# Patient Record
Sex: MALE | Race: WHITE | Employment: FULL TIME | URBAN - METROPOLITAN AREA
[De-identification: names, ages, dates, MRNs, and addresses within clinical notes are randomized per-mention and may not be internally consistent; named-entity substitution may affect disease eponyms.]

---

## 2017-05-24 DIAGNOSIS — I35.0 NONRHEUMATIC AORTIC VALVE STENOSIS: ICD-10-CM

## 2017-05-24 RX ORDER — LISINOPRIL 2.5 MG/1
TABLET ORAL
Qty: 30 TABLET | Refills: 0 | Status: SHIPPED | OUTPATIENT
Start: 2017-05-24 | End: 2018-02-08 | Stop reason: DRUGHIGH

## 2017-07-14 ENCOUNTER — HOSPITAL ENCOUNTER (OUTPATIENT)
Dept: OTHER | Age: 47
Discharge: HOME | End: 2017-07-14
Attending: INTERNAL MEDICINE | Admitting: INTERNAL MEDICINE

## 2017-11-28 RX ORDER — WARFARIN SODIUM 5 MG/1
TABLET ORAL
Qty: 30 TABLET | Refills: 0 | Status: SHIPPED | OUTPATIENT
Start: 2017-11-28 | End: 2017-12-27 | Stop reason: SDUPTHER

## 2017-12-28 RX ORDER — WARFARIN SODIUM 5 MG/1
TABLET ORAL
Qty: 30 TABLET | Refills: 0 | Status: SHIPPED | OUTPATIENT
Start: 2017-12-28 | End: 2018-02-08 | Stop reason: SDUPTHER

## 2018-02-08 ENCOUNTER — OFFICE VISIT (OUTPATIENT)
Dept: CARDIOLOGY CLINIC | Age: 48
End: 2018-02-08

## 2018-02-08 VITALS
SYSTOLIC BLOOD PRESSURE: 122 MMHG | WEIGHT: 256 LBS | DIASTOLIC BLOOD PRESSURE: 78 MMHG | BODY MASS INDEX: 35.84 KG/M2 | OXYGEN SATURATION: 95 % | HEIGHT: 71 IN | HEART RATE: 80 BPM

## 2018-02-08 DIAGNOSIS — E78.2 MIXED HYPERLIPIDEMIA: ICD-10-CM

## 2018-02-08 DIAGNOSIS — I35.0 NONRHEUMATIC AORTIC VALVE STENOSIS: Primary | ICD-10-CM

## 2018-02-08 DIAGNOSIS — I10 ESSENTIAL HYPERTENSION: ICD-10-CM

## 2018-02-08 PROCEDURE — 99214 OFFICE O/P EST MOD 30 MIN: CPT | Performed by: NURSE PRACTITIONER

## 2018-02-08 RX ORDER — WARFARIN SODIUM 5 MG/1
TABLET ORAL
Qty: 180 TABLET | Refills: 1 | Status: SHIPPED | OUTPATIENT
Start: 2018-02-08 | End: 2019-01-21 | Stop reason: SDUPTHER

## 2018-02-08 RX ORDER — ALLOPURINOL 100 MG/1
100 TABLET ORAL 2 TIMES DAILY
COMMUNITY
End: 2019-01-21 | Stop reason: ALTCHOICE

## 2018-02-08 RX ORDER — LISINOPRIL 10 MG/1
10 TABLET ORAL DAILY
COMMUNITY
End: 2019-01-21 | Stop reason: SDUPTHER

## 2018-02-08 NOTE — PROGRESS NOTES
66 07/14/2017    BILITOT <0.2 07/14/2017     Lab Results   Component Value Date    CREATININE 1.0 07/14/2017    BUN 13 07/14/2017     07/14/2017    K 4.6 07/14/2017    CL 99 07/14/2017    CO2 25 07/14/2017     Lab Results   Component Value Date    TSH 1.61 07/14/2017     Lab Results   Component Value Date    WBC 6.1 07/14/2017    HGB 14.1 07/14/2017    HCT 42.0 07/14/2017    MCV 93.2 07/14/2017     07/14/2017     No components found for: CHLPL  Lab Results   Component Value Date    TRIG 129 07/14/2017    TRIG 107 03/04/2016    TRIG 151 (H) 08/04/2014     Lab Results   Component Value Date    HDL 73 (H) 07/14/2017    HDL 57 03/04/2016    HDL 52 08/04/2014     Lab Results   Component Value Date    LDLCALC 196 (H) 07/14/2017    LDLCALC 182 (H) 03/04/2016    LDLCALC 142 (H) 08/04/2014     Lab Results   Component Value Date    LABVLDL 26 07/14/2017    LABVLDL 21 03/04/2016    LABVLDL 30 08/04/2014     Radiology Review:  Pertinent images / reports were reviewed as a part of this visit and reveals the following:    Last Angiogram: 7/31/14:   Normal Coronaries    Carotid US: 8/1/14: maryan ICA 1-15%    8/8/14: procedure:  PREOPERATIVE DIAGNOSIS: Severe symptomatic aortic stenosis. POSTOPERATIVE DIAGNOSIS: Severe symptomatic aortic stenosis. PROCEDURE:   1. Aortic valve replacement with a 22-mm ATS  mechanical valve, serial number [id number].  2. Transesophageal echocardiography performed by Anesthesia. Echo: Nov '15:  Summary  -Normal left ventricle size and systolic function with an estimated ejection fraction of 55-60%. -No regional wall motion abnormalities are seen. -Mild concentric left ventricular hypertrophy is present.  -There is reversal of E/A inflow velocities across the mitral valve suggesting impaired left ventricular relaxation.  -A mechanical aortic valve appears well seated with a maximum gradient of 21 mmHg and a mean gradient of 10 mmHg.   -There is trivial tricuspid regurgitation with RVSP estimated at 33 mmHg. -The left atrium is at the upper limits of normal in size. Assessment:     1. Aortic stenosis   ~stable: s/p AVR  ~well seated prosthetic valve by echo Nov '15; maximum gradient of 21 mmHg and a mean gradient of 10 mmHg  ~crisp valve to auscultation   ~warfarin managed in coumadin clinic. Last INR 2.2; current dose is 10 mg on M & F, 5 mg remaining days     2. Other and unspecified hyperlipidemia   ~LDL elevated on profile from '17  ~we will attempt to obtain his most recent labs from his PCP    3. HTN         ~suboptimal on arrival ; improved by recheck         ~lisinopril increased to 10 mg daily one week ago by PCP          I had the opportunity to review the clinical symptoms and presentation of [address] [place] [address] III. Plan:     1. Continue present management  2. F/U in 8 months with Dr. Luis Carlos Vázquez       Overall the patient is stable from CV standpoint    I have addresed the patient's cardiac risk factors and adjusted pharmacologic treatment as needed. In addition, I have reinforced the need for patient directed risk factor modification. Further evaluation will be based upon the patient's clinical course and testing results. All questions and concerns were addressed to the patient. Alternatives to my treatment were discussed. The patient is not currently smoking: quit 6 months ago. The risks related to smoking were reviewed with the patient. Recommend maintaining a smoke-free lifestyle. Patient is on a beta-blocker  Patient is on an ace-i  Patient is on a statin    Dual Antiplatelet therapy / anti-coagulation has been recommended / prescribed for this patient. Education conducted on adverse reactions including bleeding was discussed. The patient verbalizes understanding not to stop medications without discussing with us. Discussed exercise: 30-60 minutes 7 days/week. Uses elliptical 3 x weekly and using weights. Discussed Low saturated fat diet.      Thank

## 2018-06-26 DIAGNOSIS — I35.0 NONRHEUMATIC AORTIC VALVE STENOSIS: ICD-10-CM

## 2018-06-27 RX ORDER — LISINOPRIL 2.5 MG/1
TABLET ORAL
Qty: 90 TABLET | Refills: 3 | Status: SHIPPED | OUTPATIENT
Start: 2018-06-27 | End: 2019-01-21 | Stop reason: DRUGHIGH

## 2018-08-02 ENCOUNTER — TELEPHONE (OUTPATIENT)
Dept: CARDIOLOGY CLINIC | Age: 48
End: 2018-08-02

## 2018-08-07 RX ORDER — WARFARIN SODIUM 5 MG/1
TABLET ORAL
Qty: 180 TABLET | Refills: 1 | OUTPATIENT
Start: 2018-08-07

## 2018-11-20 ENCOUNTER — TELEPHONE (OUTPATIENT)
Dept: CARDIOLOGY CLINIC | Age: 48
End: 2018-11-20

## 2018-11-20 DIAGNOSIS — I35.0 AORTIC VALVE STENOSIS, ETIOLOGY OF CARDIAC VALVE DISEASE UNSPECIFIED: Primary | ICD-10-CM

## 2019-01-16 ENCOUNTER — HOSPITAL ENCOUNTER (OUTPATIENT)
Dept: NON INVASIVE DIAGNOSTICS | Age: 49
Discharge: HOME | End: 2019-01-16
Payer: COMMERCIAL

## 2019-01-16 LAB
LV EF: 60 %
LVEF MODALITY: NORMAL

## 2019-01-16 PROCEDURE — 93306 TTE W/DOPPLER COMPLETE: CPT

## 2019-01-16 PROCEDURE — C8929 TTE W OR WO FOL WCON,DOPPLER: HCPCS

## 2019-01-21 ENCOUNTER — OFFICE VISIT (OUTPATIENT)
Dept: CARDIOLOGY CLINIC | Age: 49
End: 2019-01-21
Payer: COMMERCIAL

## 2019-01-21 VITALS
SYSTOLIC BLOOD PRESSURE: 146 MMHG | HEART RATE: 77 BPM | DIASTOLIC BLOOD PRESSURE: 90 MMHG | HEIGHT: 71 IN | BODY MASS INDEX: 35 KG/M2 | WEIGHT: 250 LBS | OXYGEN SATURATION: 98 %

## 2019-01-21 DIAGNOSIS — E78.49 OTHER HYPERLIPIDEMIA: ICD-10-CM

## 2019-01-21 DIAGNOSIS — I35.0 AORTIC VALVE STENOSIS, ETIOLOGY OF CARDIAC VALVE DISEASE UNSPECIFIED: Primary | ICD-10-CM

## 2019-01-21 DIAGNOSIS — I10 ESSENTIAL HYPERTENSION: ICD-10-CM

## 2019-01-21 DIAGNOSIS — Z72.0 TOBACCO ABUSE: ICD-10-CM

## 2019-01-21 DIAGNOSIS — M79.606 PAIN OF LOWER EXTREMITY, UNSPECIFIED LATERALITY: ICD-10-CM

## 2019-01-21 PROCEDURE — 99214 OFFICE O/P EST MOD 30 MIN: CPT | Performed by: INTERNAL MEDICINE

## 2019-01-21 RX ORDER — LISINOPRIL 10 MG/1
10 TABLET ORAL DAILY
Qty: 90 TABLET | Refills: 3 | Status: SHIPPED | OUTPATIENT
Start: 2019-01-21 | End: 2019-09-06 | Stop reason: SDUPTHER

## 2019-01-21 RX ORDER — ATORVASTATIN CALCIUM 40 MG/1
40 TABLET, FILM COATED ORAL DAILY
Qty: 90 TABLET | Refills: 3 | Status: SHIPPED | OUTPATIENT
Start: 2019-01-21 | End: 2019-09-06 | Stop reason: SDUPTHER

## 2019-01-21 RX ORDER — WARFARIN SODIUM 5 MG/1
TABLET ORAL
Qty: 180 TABLET | Refills: 3 | Status: SHIPPED | OUTPATIENT
Start: 2019-01-21 | End: 2020-08-07 | Stop reason: SDUPTHER

## 2019-01-28 ENCOUNTER — TELEPHONE (OUTPATIENT)
Dept: CARDIOLOGY CLINIC | Age: 49
End: 2019-01-28

## 2019-01-28 ENCOUNTER — HOSPITAL ENCOUNTER (OUTPATIENT)
Dept: VASCULAR LAB | Age: 49
Discharge: HOME | End: 2019-01-28
Payer: COMMERCIAL

## 2019-01-28 DIAGNOSIS — I73.9 CLAUDICATION (HCC): ICD-10-CM

## 2019-01-28 DIAGNOSIS — M79.606 PAIN OF LOWER EXTREMITY, UNSPECIFIED LATERALITY: ICD-10-CM

## 2019-01-28 DIAGNOSIS — I73.9 CLAUDICATION (HCC): Primary | ICD-10-CM

## 2019-01-28 PROCEDURE — 93922 UPR/L XTREMITY ART 2 LEVELS: CPT

## 2019-07-29 PROBLEM — I73.9 PVD (PERIPHERAL VASCULAR DISEASE) (HCC): Status: ACTIVE | Noted: 2019-07-29

## 2019-09-06 RX ORDER — ATORVASTATIN CALCIUM 40 MG/1
40 TABLET, FILM COATED ORAL DAILY
Qty: 30 TABLET | Refills: 0 | Status: SHIPPED | OUTPATIENT
Start: 2019-09-06 | End: 2019-09-06 | Stop reason: SDUPTHER

## 2019-09-06 RX ORDER — ATORVASTATIN CALCIUM 40 MG/1
40 TABLET, FILM COATED ORAL DAILY
Qty: 30 TABLET | Refills: 0 | Status: SHIPPED | OUTPATIENT
Start: 2019-09-06 | End: 2020-01-29

## 2019-09-06 RX ORDER — LISINOPRIL 10 MG/1
10 TABLET ORAL DAILY
Qty: 30 TABLET | Refills: 0 | Status: SHIPPED | OUTPATIENT
Start: 2019-09-06 | End: 2019-09-06 | Stop reason: SDUPTHER

## 2019-09-06 RX ORDER — LISINOPRIL 10 MG/1
10 TABLET ORAL DAILY
Qty: 30 TABLET | Refills: 0 | Status: SHIPPED | OUTPATIENT
Start: 2019-09-06 | End: 2020-01-29

## 2019-09-09 NOTE — PROGRESS NOTES
[address]   Cardiac Followup    Referring Provider:  Tyrell Ji MD     Chief Complaint   Patient presents with    Hypertension        History of Present Illness:   Mr Shara Christianson is a 44 y.o.male seen as a routine follow up. He has a history of AVR(Probable bicuspid aortic valve with raphe that was heavily calcified with fused commissures )(2014-nl coronaries) htn, hchol   In the interval since his last visit, he has had a fracture of his left foot(caused by a shoe injury), then, fractured his right great toe when a TV was dropped on his toe. These have both since healed     Today he states he is feeling well. He has no resting or exertional chest pain, sob, palpitations or dizziness. He has no change in exercise tolerance. No orthopnea. Bilateral edema. Never been evaluated for MARIANNE, he does snore    Patient is compliant  with medication and is tolerating them well without side effects      Past Medical History:   has a past medical history of Aortic stenosis, Colon polyp, GERD (gastroesophageal reflux disease), Headache(784.0), Hernia, and Neuropathy. Surgical History:   has a past surgical history that includes Appendectomy; Colonoscopy; and Aortic valve replacement (8/8/2014). Social History:   reports that he quit smoking about 2 years ago. His smoking use included cigarettes. He has a 10.00 pack-year smoking history. He has never used smokeless tobacco. He reports that he drinks about 3.0 standard drinks of alcohol per week. He reports that he does not use drugs. Family History:  family history includes Heart Disease in his maternal grandmother; High Blood Pressure in his maternal grandmother and mother.      Home Medications:  Current Outpatient Medications   Medication Sig Dispense Refill    predniSONE (DELTASONE) 10 MG tablet Take 10-40 mg by mouth      lisinopril (PRINIVIL;ZESTRIL) 10 MG tablet Take 1 tablet by mouth daily 30 tablet 0    atorvastatin (LIPITOR) 40 MG tablet Take

## 2019-09-13 ENCOUNTER — OFFICE VISIT (OUTPATIENT)
Dept: CARDIOLOGY CLINIC | Age: 49
End: 2019-09-13
Payer: COMMERCIAL

## 2019-09-13 VITALS
WEIGHT: 270 LBS | DIASTOLIC BLOOD PRESSURE: 70 MMHG | SYSTOLIC BLOOD PRESSURE: 110 MMHG | HEART RATE: 78 BPM | HEIGHT: 71 IN | BODY MASS INDEX: 37.8 KG/M2

## 2019-09-13 DIAGNOSIS — I10 ESSENTIAL HYPERTENSION: Primary | ICD-10-CM

## 2019-09-13 DIAGNOSIS — E78.49 OTHER HYPERLIPIDEMIA: ICD-10-CM

## 2019-09-13 DIAGNOSIS — I35.0 AORTIC VALVE STENOSIS, ETIOLOGY OF CARDIAC VALVE DISEASE UNSPECIFIED: ICD-10-CM

## 2019-09-13 DIAGNOSIS — R06.83 SNORING: ICD-10-CM

## 2019-09-13 PROCEDURE — 99213 OFFICE O/P EST LOW 20 MIN: CPT | Performed by: INTERNAL MEDICINE

## 2019-09-13 PROCEDURE — 93000 ELECTROCARDIOGRAM COMPLETE: CPT | Performed by: INTERNAL MEDICINE

## 2019-09-13 RX ORDER — PREDNISONE 10 MG/1
10-40 TABLET ORAL AS NEEDED
COMMUNITY
Start: 2019-05-22

## 2020-01-16 RX ORDER — WARFARIN SODIUM 5 MG/1
TABLET ORAL
Qty: 180 TABLET | Refills: 4 | OUTPATIENT
Start: 2020-01-16

## 2020-01-16 NOTE — TELEPHONE ENCOUNTER
Pt has his INR checked with [facility]. I have refused this request for Warfarin. [facility] [facility] needs to refill that for the pt. E [facility] Metoprolol 25 mg #[facility].       Last OV with DGB 9/13/19  htn  -/70 (Site: Left Upper Arm, Position: Sitting, Cuff Size: Large Adult)   Pulse 78   Ht 5' 11\" (1.803 m)   Wt 270 lb (122.5 kg)   BMI 37.66 kg/m²    Tolerates lisinopril 10 mg daily lopressor 25 mg daily

## 2020-01-29 RX ORDER — LISINOPRIL 10 MG/1
TABLET ORAL
Qty: 90 TABLET | Refills: 0 | Status: SHIPPED | OUTPATIENT
Start: 2020-01-29 | End: 2020-04-28

## 2020-01-29 RX ORDER — ATORVASTATIN CALCIUM 40 MG/1
TABLET, FILM COATED ORAL
Qty: 90 TABLET | Refills: 0 | Status: SHIPPED | OUTPATIENT
Start: 2020-01-29 | End: 2020-04-28

## 2020-01-29 NOTE — TELEPHONE ENCOUNTER
Called patient lmom that we received a refills for his lisinopril and atorvastatin, that we can only give him 1 refill due to he needs lab work done. Will place lab orders in his chart.

## 2020-04-28 RX ORDER — LISINOPRIL 10 MG/1
TABLET ORAL
Qty: 90 TABLET | Refills: 3 | Status: SHIPPED | OUTPATIENT
Start: 2020-04-28 | End: 2020-07-21 | Stop reason: SDUPTHER

## 2020-04-28 RX ORDER — ATORVASTATIN CALCIUM 40 MG/1
TABLET, FILM COATED ORAL
Qty: 90 TABLET | Refills: 3 | Status: SHIPPED | OUTPATIENT
Start: 2020-04-28 | End: 2020-07-21 | Stop reason: SDUPTHER

## 2020-04-29 ENCOUNTER — TELEPHONE (OUTPATIENT)
Dept: CARDIOLOGY CLINIC | Age: 50
End: 2020-04-29

## 2020-04-29 NOTE — TELEPHONE ENCOUNTER
I spoke to the pt and informed him that [facility] is doing drive-by INR checks. The pt has not had an INR check since 2/10/2020. The pt states he checks every 4 weeks. He use to go to [facility] until that clinic closed. I have asked the pt to get his INR drawn tomorrow to make sure he is still in range since its been 2 months since he was last checked. The pt verbalized understanding and agreed. I filled out the [facility] coumadin clinic form and faxed it to [facility] for DGB to sign tomorrow.

## 2020-04-29 NOTE — TELEPHONE ENCOUNTER
. Chepe Schmitt called to speak with someone about reestablishing his INR monitoring since the [facility] clinic closed. He would prefer either home monitoring, or being set up at the [place] coumadin clinic when they reopen. Please call the patient to discuss options.   His INR was last checked in February 2020.      [phone]

## 2020-04-30 ENCOUNTER — HOSPITAL ENCOUNTER (OUTPATIENT)
Age: 50
Discharge: HOME | End: 2020-04-30
Payer: COMMERCIAL

## 2020-04-30 LAB
INR BLD: 3.29 (ref 0.86–1.14)
PROTHROMBIN TIME: 38.6 SEC (ref 10–13.2)

## 2020-04-30 PROCEDURE — 85610 PROTHROMBIN TIME: CPT

## 2020-04-30 PROCEDURE — 36415 COLL VENOUS BLD VENIPUNCTURE: CPT

## 2020-05-07 ENCOUNTER — TELEPHONE (OUTPATIENT)
Dept: CARDIOLOGY CLINIC | Age: 50
End: 2020-05-07

## 2020-05-08 ENCOUNTER — TELEPHONE (OUTPATIENT)
Dept: CARDIOLOGY CLINIC | Age: 50
End: 2020-05-08

## 2020-05-08 ENCOUNTER — TELEPHONE (OUTPATIENT)
Dept: PHARMACY | Age: 50
End: 2020-05-08

## 2020-05-18 ENCOUNTER — ANTI-COAG VISIT (OUTPATIENT)
Dept: PHARMACY | Age: 50
End: 2020-05-18
Payer: COMMERCIAL

## 2020-05-18 PROBLEM — Z79.01 LONG TERM (CURRENT) USE OF ANTICOAGULANTS: Status: ACTIVE | Noted: 2020-05-18

## 2020-05-18 PROBLEM — Z95.2 HEART VALVE REPLACED: Status: ACTIVE | Noted: 2020-05-18

## 2020-05-18 LAB — INTERNATIONAL NORMALIZATION RATIO, POC: 2.7

## 2020-05-18 PROCEDURE — 99213 OFFICE O/P EST LOW 20 MIN: CPT

## 2020-05-18 PROCEDURE — 85610 PROTHROMBIN TIME: CPT

## 2020-06-08 ENCOUNTER — ANTI-COAG VISIT (OUTPATIENT)
Dept: PHARMACY | Age: 50
End: 2020-06-08
Payer: COMMERCIAL

## 2020-06-08 VITALS — TEMPERATURE: 98 F

## 2020-06-08 LAB — INTERNATIONAL NORMALIZATION RATIO, POC: 4.5

## 2020-06-08 PROCEDURE — 99211 OFF/OP EST MAY X REQ PHY/QHP: CPT

## 2020-06-08 PROCEDURE — 85610 PROTHROMBIN TIME: CPT

## 2020-06-08 NOTE — PROGRESS NOTES
PHARMACY CONSULT: MED RECONCILIATION/REVIEW ADDENDUM    For Pharmacy Admin Tracking Only    PHSO: No  Total # of Interventions Recommended: 1  - Decreased Dose #: 1  - Maintenance Safety Lab Monitoring #: 1  Total Interventions Accepted: 1  Time Spent (min): 15    Yariel BurrowsD

## 2020-06-29 ENCOUNTER — ANTI-COAG VISIT (OUTPATIENT)
Dept: PHARMACY | Age: 50
End: 2020-06-29
Payer: COMMERCIAL

## 2020-06-29 VITALS — TEMPERATURE: 98.1 F

## 2020-06-29 LAB — INTERNATIONAL NORMALIZATION RATIO, POC: 2

## 2020-06-29 PROCEDURE — 85610 PROTHROMBIN TIME: CPT

## 2020-06-29 PROCEDURE — 99211 OFF/OP EST MAY X REQ PHY/QHP: CPT

## 2020-07-14 ENCOUNTER — ANTI-COAG VISIT (OUTPATIENT)
Dept: PHARMACY | Age: 50
End: 2020-07-14
Payer: COMMERCIAL

## 2020-07-14 VITALS — TEMPERATURE: 97.7 F

## 2020-07-14 LAB — INTERNATIONAL NORMALIZATION RATIO, POC: 2.3

## 2020-07-14 PROCEDURE — 99211 OFF/OP EST MAY X REQ PHY/QHP: CPT

## 2020-07-14 PROCEDURE — 85610 PROTHROMBIN TIME: CPT

## 2020-07-14 NOTE — PROGRESS NOTES
Mr. James Jenkins is a 44 y.o. y/o male with history of Heart Valve Replacement who presents today for anticoagulation monitoring and adjustment. Pertinent PMH: HTN, PVD,   Pt allergic to sun so will be on prednisone in the summer   Pt has been on warfarin since ~2015  Hx pt of [facility] clinic. Patient Reported Findings:  Yes     No  [x]   []       Patient verifies current dosing regimen as listed pt normal weekly dose has been 10 mg on Mon and Fri and 5 mg all other days of the week   []   [x]       S/S bleeding/bruising/swelling/SOB  []   [x]       Blood in urine or stool  []   [x]       Procedures scheduled in the future at this time  []   [x]       Missed Dose -   []   [x]       Extra Dose  [x]   []       Change in medications Takes prednisone tapers through summer months when has flare up of reaction (40 mg taper x 16 days) --> finished prednisone taper  Started taking prevagen OTC---> pred started last week Monday, on it for 16 days --> finished       []   [x]       Change in health/diet/appetite states that he will eat vit k occasionally every few weeks 4.5---> no changes   []   [x]       Change in alcohol use has liquor most night of the week   []   [x]       Change in activity  []   [x]       Hospital admission  []   [x]       Emergency department visit  [x]   []       Other complaints Most recent INR was 3.29 on 4/30    Clinical Outcomes:  Yes     No  []   [x]       Major bleeding event  []   [x]       Thromboembolic event  Uses 5 mg tablets   Duration of warfarin Therapy: indefinite  INR Range:  2.5-3.5     INR is 2.3 today after adjustment for prednisone   Continue to take 10 mg on Mon and Fri and 5 mg all other days of the week. Encouraged to maintain a consistency of vegetables/salads.   Return to clinic in 2 weeks, 7/28    Referring Dr. Mary Lozano   INR (no units)   Date Value   06/29/2020 2.0   06/08/2020 4.5   05/18/2020 2.7   04/30/2020 3.29 (H)   09/18/2014 1.94 (H) 08/14/2014 2.46 (H)   08/13/2014 1.36 (H)         CLINICAL PHARMACY CONSULT: MED RECONCILIATION/REVIEW ADDENDUM    For Pharmacy Admin Tracking Only    PHSO: No  Total # of Interventions Recommended: 0  - Maintenance Safety Lab Monitoring #: 1  Total Interventions Accepted: 0  Time Spent (min): 15    Janet Serrato, PharmD

## 2020-07-21 RX ORDER — LISINOPRIL 10 MG/1
10 TABLET ORAL DAILY
Qty: 90 TABLET | Refills: 0 | Status: SHIPPED | OUTPATIENT
Start: 2020-07-21 | End: 2020-08-07 | Stop reason: SDUPTHER

## 2020-07-21 RX ORDER — ATORVASTATIN CALCIUM 40 MG/1
40 TABLET, FILM COATED ORAL DAILY
Qty: 90 TABLET | Refills: 0 | Status: SHIPPED | OUTPATIENT
Start: 2020-07-21 | End: 2020-08-07 | Stop reason: SDUPTHER

## 2020-07-28 ENCOUNTER — APPOINTMENT (OUTPATIENT)
Dept: PHARMACY | Age: 50
End: 2020-07-28
Payer: COMMERCIAL

## 2020-07-30 ENCOUNTER — TELEPHONE (OUTPATIENT)
Dept: PHARMACY | Age: 50
End: 2020-07-30

## 2020-08-07 RX ORDER — LISINOPRIL 10 MG/1
10 TABLET ORAL DAILY
Qty: 90 TABLET | Refills: 0 | Status: SHIPPED | OUTPATIENT
Start: 2020-08-07 | End: 2020-09-11 | Stop reason: SDUPTHER

## 2020-08-07 RX ORDER — WARFARIN SODIUM 5 MG/1
TABLET ORAL
Qty: 180 TABLET | Refills: 3 | Status: SHIPPED | OUTPATIENT
Start: 2020-08-07 | End: 2021-08-23 | Stop reason: SDUPTHER

## 2020-08-07 RX ORDER — ATORVASTATIN CALCIUM 40 MG/1
40 TABLET, FILM COATED ORAL DAILY
Qty: 90 TABLET | Refills: 0 | Status: SHIPPED | OUTPATIENT
Start: 2020-08-07 | End: 2020-09-11 | Stop reason: SDUPTHER

## 2020-08-07 NOTE — TELEPHONE ENCOUNTER
Paper refill    Last OV:  09/13/19    Last Refill: 07/21/20    Last Labs: 01/16/19    Next OV: 09/11/20

## 2020-08-17 ENCOUNTER — ANTI-COAG VISIT (OUTPATIENT)
Dept: PHARMACY | Age: 50
End: 2020-08-17
Payer: COMMERCIAL

## 2020-08-17 VITALS — TEMPERATURE: 97.5 F

## 2020-08-17 LAB — INTERNATIONAL NORMALIZATION RATIO, POC: 3.5

## 2020-08-17 PROCEDURE — 85610 PROTHROMBIN TIME: CPT

## 2020-08-17 PROCEDURE — 99211 OFF/OP EST MAY X REQ PHY/QHP: CPT

## 2020-08-31 ENCOUNTER — ANTI-COAG VISIT (OUTPATIENT)
Dept: PHARMACY | Age: 50
End: 2020-08-31
Payer: COMMERCIAL

## 2020-08-31 VITALS — TEMPERATURE: 96.9 F

## 2020-08-31 LAB — INTERNATIONAL NORMALIZATION RATIO, POC: 3.6

## 2020-08-31 PROCEDURE — 85610 PROTHROMBIN TIME: CPT

## 2020-08-31 PROCEDURE — 99211 OFF/OP EST MAY X REQ PHY/QHP: CPT

## 2020-08-31 NOTE — PROGRESS NOTES
Date Value   08/17/2020 3.5   07/14/2020 2.3   06/29/2020 2.0   06/08/2020 4.5   04/30/2020 3.29 (H)   09/18/2014 1.94 (H)   08/14/2014 2.46 (H)   08/13/2014 1.36 (H)         CLINICAL PHARMACY CONSULT: MED RECONCILIATION/REVIEW ADDENDUM    For Pharmacy Admin Tracking Only    PHSO: No  Total # of Interventions Recommended: 1  - Decreased Dose #: 1  - Maintenance Safety Lab Monitoring #: 1  Total Interventions Accepted: 1  Time Spent (min): 15    Contreras Hunt, YarielD

## 2020-09-04 ENCOUNTER — HOSPITAL ENCOUNTER (OUTPATIENT)
Age: 50
Discharge: HOME | End: 2020-09-04
Payer: COMMERCIAL

## 2020-09-04 LAB
ALT SERPL-CCNC: 21 U/L (ref 10–40)
ANION GAP SERPL CALCULATED.3IONS-SCNC: 12 MMOL/L (ref 3–16)
AST SERPL-CCNC: 27 U/L (ref 15–37)
BUN BLDV-MCNC: 11 MG/DL (ref 7–20)
CALCIUM SERPL-MCNC: 9.3 MG/DL (ref 8.3–10.6)
CHLORIDE BLD-SCNC: 101 MMOL/L (ref 99–110)
CHOLESTEROL, TOTAL: 194 MG/DL (ref 0–199)
CO2: 25 MMOL/L (ref 21–32)
CREAT SERPL-MCNC: 0.8 MG/DL (ref 0.9–1.3)
GFR AFRICAN AMERICAN: >60
GFR NON-AFRICAN AMERICAN: >60
GLUCOSE BLD-MCNC: 112 MG/DL (ref 70–99)
HDLC SERPL-MCNC: 46 MG/DL (ref 40–60)
LDL CHOLESTEROL CALCULATED: 112 MG/DL
POTASSIUM SERPL-SCNC: 4.7 MMOL/L (ref 3.5–5.1)
SODIUM BLD-SCNC: 138 MMOL/L (ref 136–145)
TRIGL SERPL-MCNC: 179 MG/DL (ref 0–150)
VLDLC SERPL CALC-MCNC: 36 MG/DL

## 2020-09-04 PROCEDURE — 36415 COLL VENOUS BLD VENIPUNCTURE: CPT

## 2020-09-04 PROCEDURE — 84450 TRANSFERASE (AST) (SGOT): CPT

## 2020-09-04 PROCEDURE — 80061 LIPID PANEL: CPT

## 2020-09-04 PROCEDURE — 84460 ALANINE AMINO (ALT) (SGPT): CPT

## 2020-09-04 PROCEDURE — 80048 BASIC METABOLIC PNL TOTAL CA: CPT

## 2020-09-09 NOTE — PROGRESS NOTES
(LOPRESSOR) 25 MG tablet TAKE 1 TABLET TWICE A  tablet 0    warfarin (COUMADIN) 5 MG tablet TAKE 1-2 TABLETS BY MOUTH DAILY AS DIRECTED 180 tablet 3    aspirin 325 MG EC tablet Take 1 tablet by mouth daily 30 tablet 3    predniSONE (DELTASONE) 10 MG tablet Take 10-40 mg by mouth daily Seasonal (Summer only)      Cholecalciferol (VITAMIN D-3 PO) Take by mouth once a week       No current facility-administered medications for this visit. Facility-Administered Medications Ordered in Other Visits   Medication Dose Route Frequency Provider Last Rate Last Dose    mupirocin (BACTROBAN) 2 % nasal ointment   Each Nostril Once Flynn Goodell, MD        aminocaproic acid (AMICAR) 5,000 mg in sodium chloride 0.9 % 250 mL IV bolus  5 g Intravenous Once Flynn Goodell, MD        insulin regular (NOVOLIN R) 100 Units in sodium chloride 0.9 % 100 mL infusion  0.5 Units/hr Intravenous Continuous Flynn Goodell, MD        lactated ringers infusion   Intravenous Continuous Flynn Goodell, MD        pantoprazole (PROTONIX) injection 40 mg  40 mg Intravenous Once Flynn Goodell, MD        ceFAZolin (ANCEF) 2 g in dextrose 3% 50mL IVPB (duplex)  2 g Intravenous Once Flynn Goodell, MD        vancomycin (VANCOCIN) 1,500 mg in dextrose 5 % 250 mL IVPB  1,500 mg Intravenous Once Flynn Goodell, MD           Allergies:  Patient has no known allergies. Review of Systems:   · Constitutional: there has been no unanticipated weight loss. There's been no change in energy level, sleep pattern, or activity level. · Eyes: No visual changes or diplopia. No scleral icterus. · ENT: No Headaches, hearing loss or vertigo. No mouth sores or sore throat. · Cardiovascular: Reviewed in HPI  · Respiratory: No cough or wheezing, no sputum production. No hematemesis. · Gastrointestinal: No abdominal pain, appetite loss, blood in stools. No change in bowel or bladder habits.   · Genitourinary: No dysuria, trouble voiding, or hematuria. · Musculoskeletal:  No gait disturbance, weakness or joint complaints. · Integumentary: No rash or pruritis. · Neurological: No headache, diplopia, change in muscle strength, numbness or tingling. No change in gait, balance, coordination, mood, affect, memory, mentation, behavior. · Psychiatric: No anxiety, no depression. · Endocrine: No malaise, fatigue or temperature intolerance. No excessive thirst, fluid intake, or urination. No tremor. · Hematologic/Lymphatic: No abnormal bruising or bleeding, blood clots or swollen lymph nodes. · Allergic/Immunologic: No nasal congestion or hives. Physical Examination:    Vitals:    09/11/20 0801   BP: 126/78   Pulse: 78   Resp: 18   Temp: 97.4 °F (36.3 °C)   SpO2: 99%   Constitutional and General Appearance:   . NAD   SKIN:  .     Warm and dry  EYES:    .     EOMI  Neck:   . Normal carotid contour  Respiratory:  Normal excursion and expansion without use of accessory muscles  Resp Auscultation: Normal breath sounds without dullness  Cardiovascular: The apical impulses not displaced  Heart tones are crisp and normal  Cervical veins are not engorged  Normal S1S2, No S3, No Murmur  Peripheral pulses are symmetrical and full  Extremities: There is no clubbing, cyanosis of the extremities. No edema  Femoral Arteries: 2+ and equal  Pedal Pulses: 2+ and equal   Abdomen:  No masses or tenderness  Liver/Spleen: No Abnormalities Noted  Neurological/Psychiatric:  Alert and oriented in all spheres  Moves all extremities well  No abnormalities of mood, affect, memory    All testing and labs listed below were personally reviewed. 1/28/19 IRVING-  Right IRVING: 1.21. This is consistent with no significant PAD at rest.    Left IRVING: 1.2. This is consistent with no significant PAD at rest.    Small vessel disease noted bilaterally, right > left.        7/2014  Angiogram--normal coronaries  8/1/14 carotids --maryan ICA 1-15%  Assessment:   AS  AVR without excess bleeding or bruising  --22 mm AT  mechanical valve  on coumadin 5 mg daily  1/16/19 echo well seated mech AV no AR   On coumadin managed by [facility] (may get home monitor and have pcp monitor protime)  Echo in one year  htn  -/78 (Site: Right Upper Arm, Position: Sitting, Cuff Size: Large Adult)   Pulse 78   Temp 97.4 °F (36.3 °C)   Resp 18   Ht 5' 11\" (1.803 m)   Wt 280 lb (127 kg)   SpO2 99%   BMI 39.05 kg/m²    Tolerates lisinopril 10 mg daily lopressor 25 mg daily    hchol-  9/4/2020  Tc 194  hdl 46 ldl 112 tri 179 alt 21 ast 27  On lipitor 40 mg daily  Not to goal but will work on diet    Plan:  Echo and office visit in one year with Dr Jyoti Hermosillo all medications  Will get sleep study when best for him  Call for any questions    Scribe Attestation:  Bea Samaniego, am scribing for and in the presence of Jimmy Gabriel MD.   Miguel Ángel Cordero 09/11/20 8:15 AM   Provider Deborah Vincent is working as a scribe for and in the presence of juliana Gabriel MD). Working as a scribe, Griffincarlie Celio may have prepopulated components of this note with my historical  intellectual property under my direct supervision. Any additions to this intellectual property were performed in my presence and at my direction. Furthermore, the content and accuracy of this note have been reviewed by juliana Gabriel MD). Elizabeth De La Rosa M.D., [facility].

## 2020-09-11 ENCOUNTER — OFFICE VISIT (OUTPATIENT)
Dept: CARDIOLOGY CLINIC | Age: 50
End: 2020-09-11
Payer: COMMERCIAL

## 2020-09-11 VITALS
WEIGHT: 280 LBS | BODY MASS INDEX: 39.2 KG/M2 | RESPIRATION RATE: 18 BRPM | DIASTOLIC BLOOD PRESSURE: 78 MMHG | OXYGEN SATURATION: 99 % | HEART RATE: 78 BPM | HEIGHT: 71 IN | TEMPERATURE: 97.4 F | SYSTOLIC BLOOD PRESSURE: 126 MMHG

## 2020-09-11 PROCEDURE — 1036F TOBACCO NON-USER: CPT | Performed by: INTERNAL MEDICINE

## 2020-09-11 PROCEDURE — G8427 DOCREV CUR MEDS BY ELIG CLIN: HCPCS | Performed by: INTERNAL MEDICINE

## 2020-09-11 PROCEDURE — 3017F COLORECTAL CA SCREEN DOC REV: CPT | Performed by: INTERNAL MEDICINE

## 2020-09-11 PROCEDURE — 99213 OFFICE O/P EST LOW 20 MIN: CPT | Performed by: INTERNAL MEDICINE

## 2020-09-11 PROCEDURE — G8417 CALC BMI ABV UP PARAM F/U: HCPCS | Performed by: INTERNAL MEDICINE

## 2020-09-11 RX ORDER — ATORVASTATIN CALCIUM 40 MG/1
40 TABLET, FILM COATED ORAL DAILY
Qty: 90 TABLET | Refills: 3 | Status: SHIPPED | OUTPATIENT
Start: 2020-09-11 | End: 2020-11-06

## 2020-09-11 RX ORDER — LISINOPRIL 10 MG/1
10 TABLET ORAL DAILY
Qty: 90 TABLET | Refills: 3 | Status: SHIPPED | OUTPATIENT
Start: 2020-09-11 | End: 2020-11-06

## 2020-09-21 ENCOUNTER — ANTI-COAG VISIT (OUTPATIENT)
Dept: PHARMACY | Age: 50
End: 2020-09-21
Payer: COMMERCIAL

## 2020-09-21 VITALS — TEMPERATURE: 97.3 F

## 2020-09-21 LAB — INTERNATIONAL NORMALIZATION RATIO, POC: 1.7

## 2020-09-21 PROCEDURE — 85610 PROTHROMBIN TIME: CPT

## 2020-09-21 PROCEDURE — 99211 OFF/OP EST MAY X REQ PHY/QHP: CPT

## 2020-09-21 NOTE — PROGRESS NOTES
Mr. Jose oPp is a 44 y.o. y/o male with history of Heart Valve Replacement who presents today for anticoagulation monitoring and adjustment. Pertinent PMH: HTN, PVD,   Pt allergic to sun so will be on prednisone in the summer   Pt has been on warfarin since ~2015  Hx pt of [facility] clinic. Patient Reported Findings:  Yes     No  [x]   []       Patient verifies current dosing regimen as listed pt normal weekly dose has been 10 mg on Mon and Fri and 5 mg all other days of the week ---> confirms dose   []   [x]       S/S bleeding/bruising/swelling/SOB- one bruise on arm- watching it---> denies   []   [x]       Blood in urine or stool  []   [x]       Procedures scheduled in the future at this time  []   [x]       Missed Dose -   []   [x]       Extra Dose- denies   [x]   []       Change in medications Takes prednisone tapers through summer months when has flare up of reaction (40 mg taper x 16 days) --> finished prednisone taper --> is likely going to resume prednisone later this week since having flare up and will be on for 2 weeks---> pred finished Friday    []   [x]       Change in health/diet/appetite states that he will eat vit k occasionally every few weeks ---> no changes, no NVD  []   [x]       Change in alcohol use   []   [x]       Change in activity  []   [x]       Hospital admission  []   [x]       Emergency department visit  []   [x]       Other complaints      Clinical Outcomes:  Yes     No  []   [x]       Major bleeding event  []   [x]       Thromboembolic event  Uses 5 mg tablets   Duration of warfarin Therapy: indefinite  INR Range:  2.5-3.5      INR is 1.7 today   Finished prednisone on Friday 9/18 and then missed dose Sat 9/20. Will go back to normal dose (today is boosted 10mg). Continue to take 10 mg on Mon and Fri and 5 mg all other days of the week. Encouraged to maintain a consistency of vegetables/salads.   Return to clinic in 2 weeks, 10/2  On vacation 10/5-9    Referring Dr. Andry Lee   INR (no units)   Date Value   09/21/2020 1.7   08/31/2020 3.6   08/17/2020 3.5   07/14/2020 2.3   04/30/2020 3.29 (H)   09/18/2014 1.94 (H)   08/14/2014 2.46 (H)   08/13/2014 1.36 (H)         CLINICAL PHARMACY CONSULT: MED RECONCILIATION/REVIEW ADDENDUM    For Pharmacy Admin Tracking Only    PHSO: No  Total # of Interventions Recommended: 0  - Maintenance Safety Lab Monitoring #: 1  Total Interventions Accepted: 0  Time Spent (min): Via Ginny Farmer, PharmD

## 2020-10-02 ENCOUNTER — ANTI-COAG VISIT (OUTPATIENT)
Dept: PHARMACY | Age: 50
End: 2020-10-02
Payer: COMMERCIAL

## 2020-10-02 VITALS — TEMPERATURE: 97.1 F

## 2020-10-02 LAB — INTERNATIONAL NORMALIZATION RATIO, POC: 3.3

## 2020-10-02 PROCEDURE — 99211 OFF/OP EST MAY X REQ PHY/QHP: CPT

## 2020-10-02 PROCEDURE — 85610 PROTHROMBIN TIME: CPT

## 2020-10-02 NOTE — PROGRESS NOTES
Mr. Atul Quiroz is a 44 y.o. y/o male with history of Heart Valve Replacement who presents today for anticoagulation monitoring and adjustment. Pertinent PMH: HTN, PVD,   Pt allergic to sun so will be on prednisone in the summer   Pt has been on warfarin since ~2015  Hx pt of [facility] clinic. Patient Reported Findings:  Yes     No  [x]   []       Patient verifies current dosing regimen as listed pt normal weekly dose has been 10 mg on Mon and Fri and 5 mg all other days of the week ---> confirms dose   []   [x]       S/S bleeding/bruising/swelling/SOB- one bruise on arm- watching it---> denies   []   [x]       Blood in urine or stool  []   [x]       Procedures scheduled in the future at this time  []   [x]       Missed Dose -   []   [x]       Extra Dose- denies   []   [x]       Change in medications Takes prednisone tapers through summer months when has flare up of reaction (40 mg taper x 16 days) --> no changes    []   [x]       Change in health/diet/appetite states that he will eat vit k occasionally every few weeks ---> no changes, no NVD  []   [x]       Change in alcohol use   []   [x]       Change in activity  []   [x]       Hospital admission  []   [x]       Emergency department visit  []   [x]       Other complaints      Clinical Outcomes:  Yes     No  []   [x]       Major bleeding event  []   [x]       Thromboembolic event  Uses 5 mg tablets   Duration of warfarin Therapy: indefinite  INR Range:  2.5-3.5      INR is 3.3 today   Continue to take 10 mg on Mon and Fri and 5 mg all other days of the week. Encouraged to maintain a consistency of vegetables/salads.   Return to clinic in 3 weeks, 10/26    Referring Dr. Oliver Falling   INR (no units)   Date Value   10/02/2020 3.3   09/21/2020 1.7   08/31/2020 3.6   08/17/2020 3.5   04/30/2020 3.29 (H)   09/18/2014 1.94 (H)   08/14/2014 2.46 (H)   08/13/2014 1.36 (H)         CLINICAL PHARMACY CONSULT: MED RECONCILIATION/REVIEW ADDENDUM    For Pharmacy Admin Tracking Only    PHSO: No  Total # of Interventions Recommended: 0  - Maintenance Safety Lab Monitoring #: 1  Total Interventions Accepted: 0  Time Spent (min): 15    Yariel HaddadD

## 2020-10-26 ENCOUNTER — ANTI-COAG VISIT (OUTPATIENT)
Dept: PHARMACY | Age: 50
End: 2020-10-26
Payer: COMMERCIAL

## 2020-10-26 VITALS — TEMPERATURE: 97.3 F

## 2020-10-26 LAB — INTERNATIONAL NORMALIZATION RATIO, POC: 3.3

## 2020-10-26 PROCEDURE — 85610 PROTHROMBIN TIME: CPT

## 2020-10-26 PROCEDURE — 99211 OFF/OP EST MAY X REQ PHY/QHP: CPT

## 2020-10-26 NOTE — PROGRESS NOTES
CONSULT: MED RECONCILIATION/REVIEW ADDENDUM    For Pharmacy Admin Tracking Only    PHSO: No  Total # of Interventions Recommended: 0  - Maintenance Safety Lab Monitoring #: 1  Total Interventions Accepted: 0  Time Spent (min): 15    Yariel BuenrostroD

## 2020-11-06 RX ORDER — ATORVASTATIN CALCIUM 40 MG/1
TABLET, FILM COATED ORAL
Qty: 90 TABLET | Refills: 3 | Status: SHIPPED | OUTPATIENT
Start: 2020-11-06 | End: 2021-09-30 | Stop reason: SDUPTHER

## 2020-11-06 RX ORDER — LISINOPRIL 10 MG/1
TABLET ORAL
Qty: 90 TABLET | Refills: 3 | Status: SHIPPED | OUTPATIENT
Start: 2020-11-06 | End: 2021-09-30 | Stop reason: SDUPTHER

## 2020-11-06 NOTE — TELEPHONE ENCOUNTER
RX APPROVAL:      Refill:   Requested Prescriptions     Pending Prescriptions Disp Refills    lisinopril (PRINIVIL;ZESTRIL) 10 MG tablet [Pharmacy Med Name: LISINOPRIL 10 MG TABLET] 90 tablet 3     Sig: TAKE 1 TABLET BY MOUTH EVERY DAY    metoprolol tartrate (LOPRESSOR) 25 MG tablet [Pharmacy Med Name: METOPROLOL TARTRATE 25 MG TAB] 180 tablet 3     Sig: TAKE 1 TABLET BY MOUTH TWICE A DAY    atorvastatin (LIPITOR) 40 MG tablet [Pharmacy Med Name: ATORVASTATIN 40 MG TABLET] 90 tablet 3     Sig: TAKE 1 TABLET BY MOUTH EVERY DAY      Last OV: 9/11/20  Last EKG:    Last Labs:  Lab Results   Component Value Date    CHOL 194 09/04/2020    TRIG 179 09/04/2020    HDL 46 09/04/2020    LDLCALC 112 09/04/2020    LABVLDL 36 09/04/2020     Lab Results   Component Value Date    ALT 21 09/04/2020    AST 27 09/04/2020       Plan and labs reviewed

## 2020-11-23 ENCOUNTER — ANTI-COAG VISIT (OUTPATIENT)
Dept: PHARMACY | Age: 50
End: 2020-11-23
Payer: COMMERCIAL

## 2020-11-23 VITALS — TEMPERATURE: 97.1 F

## 2020-11-23 LAB — INTERNATIONAL NORMALIZATION RATIO, POC: 3.2

## 2020-11-23 PROCEDURE — 85610 PROTHROMBIN TIME: CPT

## 2020-11-23 PROCEDURE — 99211 OFF/OP EST MAY X REQ PHY/QHP: CPT

## 2020-11-23 NOTE — PROGRESS NOTES
Mr. Arian Vincent is a 44 y.o. y/o male with history of Heart Valve Replacement who presents today for anticoagulation monitoring and adjustment. Pertinent PMH: HTN, PVD,   Pt allergic to sun so will be on prednisone in the summer   Pt has been on warfarin since ~2015  Hx pt of [facility] clinic. Patient Reported Findings:  Yes     No  [x]   []       Patient verifies current dosing regimen as listed pt normal weekly dose has been 10 mg on Mon and Fri and 5 mg all other days of the week ---> confirms dose   []   [x]       S/S bleeding/bruising/swelling/SOB- one bruise on arm- watching it---> denies   []   [x]       Blood in urine or stool  []   [x]       Procedures scheduled in the future at this time  []   [x]       Missed Dose - denies  []   [x]       Extra Dose- denies   []   [x]       Change in medications Takes prednisone tapers through summer months when has flare up of reaction (40 mg taper x 16 days) --> no changes    []   [x]       Change in health/diet/appetite states that he will eat vit k occasionally every few weeks ---> no changes, no NVD  []   [x]       Change in alcohol use   []   [x]       Change in activity  []   [x]       Hospital admission  []   [x]       Emergency department visit  []   [x]       Other complaints      Clinical Outcomes:  Yes     No  []   [x]       Major bleeding event  []   [x]       Thromboembolic event  Uses 5 mg tablets   Duration of warfarin Therapy: indefinite  INR Range:  2.5-3.5      INR is 3.2 today   Continue to take 10 mg on Mon and Fri and 5 mg all other days of the week. Encouraged to maintain a consistency of vegetables/salads.   Return to clinic in 4 weeks, 12/21    Referring Dr. Yao Da Silva   INR (no units)   Date Value   11/23/2020 3.2   10/26/2020 3.3   10/02/2020 3.3   09/21/2020 1.7   04/30/2020 3.29 (H)   09/18/2014 1.94 (H)   08/14/2014 2.46 (H)   08/13/2014 1.36 (H)         CLINICAL PHARMACY CONSULT: MED RECONCILIATION/REVIEW [address] Only    PHSO: No  Total # of Interventions Recommended: 0  - Maintenance Safety Lab Monitoring #: 1  Total Interventions Accepted: 0  Time Spent (min): 15    Miguelina Madsen, PharmD

## 2020-12-21 ENCOUNTER — ANTI-COAG VISIT (OUTPATIENT)
Dept: PHARMACY | Age: 50
End: 2020-12-21
Payer: COMMERCIAL

## 2020-12-21 VITALS — TEMPERATURE: 96.4 F

## 2020-12-21 LAB — INTERNATIONAL NORMALIZATION RATIO, POC: 4.4

## 2020-12-21 PROCEDURE — 99212 OFFICE O/P EST SF 10 MIN: CPT

## 2020-12-21 PROCEDURE — 85610 PROTHROMBIN TIME: CPT

## 2020-12-21 NOTE — PROGRESS NOTES
Mr. Luis Powell is a 44 y.o. y/o male with history of Heart Valve Replacement who presents today for anticoagulation monitoring and adjustment. Pertinent PMH: HTN, PVD,   Pt allergic to sun so will be on prednisone in the summer   Pt has been on warfarin since ~2015  Hx pt of [facility] clinic. Patient Reported Findings:  Yes     No  [x]   []       Patient verifies current dosing regimen as listed pt normal weekly dose has been 10 mg on Mon and Fri and 5 mg all other days of the week ---> confirms dose   []   [x]       S/S bleeding/bruising/swelling/SOB- one bruise on arm- watching it---> denies   []   [x]       Blood in urine or stool  []   [x]       Procedures scheduled in the future at this time  []   [x]       Missed Dose - denies  []   [x]       Extra Dose- denies   [x]   []       Change in medications Takes prednisone tapers through summer months when has flare up of reaction (40 mg taper x 16 days) --> took Unisom to help sleep    []   [x]       Change in health/diet/appetite states that he will eat vit k occasionally every few weeks ---> no changes, no NVD  [x]   []       Change in alcohol use stopped drinking alcohol for a few weeks  []   [x]       Change in activity  []   [x]       Hospital admission  []   [x]       Emergency department visit  []   [x]       Other complaints      Clinical Outcomes:  Yes     No  []   [x]       Major bleeding event  []   [x]       Thromboembolic event  Uses 5 mg tablets   Duration of warfarin Therapy: indefinite  INR Range:  2.5-3.5      INR is 4.4 today for unclear etiology   Hold dose tonight then continue to take 10 mg on Mon and Fri and 5 mg all other days of the week. Encouraged to maintain a consistency of vegetables/salads.   Return to clinic in 3 weeks, 1/11/21    Referring Dr. Abi Bird   INR (no units)   Date Value   11/23/2020 3.2   10/26/2020 3.3   10/02/2020 3.3   09/21/2020 1.7   04/30/2020 3.29 (H)   09/18/2014 1.94 (H) 08/14/2014 2.46 (H)   08/13/2014 1.36 (H)         CLINICAL PHARMACY CONSULT: MED RECONCILIATION/REVIEW ADDENDUM    For Pharmacy Admin Tracking Only    PHSO: No  Total # of Interventions Recommended: 1  - Decreased Dose #: 1  - Maintenance Safety Lab Monitoring #: 1  Total Interventions Accepted: 1  Time Spent (min): 15    Yariel RiversD

## 2021-01-12 ENCOUNTER — TELEPHONE (OUTPATIENT)
Dept: PHARMACY | Age: 51
End: 2021-01-12

## 2021-01-19 NOTE — TELEPHONE ENCOUNTER
Called patient and he answered! States the # we have is the best # to reach him. Patient rescheduled for 1/25.

## 2021-01-25 ENCOUNTER — ANTI-COAG VISIT (OUTPATIENT)
Dept: PHARMACY | Age: 51
End: 2021-01-25
Payer: COMMERCIAL

## 2021-01-25 VITALS — TEMPERATURE: 96.9 F

## 2021-01-25 DIAGNOSIS — Z79.01 LONG TERM (CURRENT) USE OF ANTICOAGULANTS: ICD-10-CM

## 2021-01-25 DIAGNOSIS — Z95.2 S/P AVR (AORTIC VALVE REPLACEMENT): ICD-10-CM

## 2021-01-25 LAB — INTERNATIONAL NORMALIZATION RATIO, POC: 3

## 2021-01-25 PROCEDURE — 99211 OFF/OP EST MAY X REQ PHY/QHP: CPT

## 2021-01-25 PROCEDURE — 85610 PROTHROMBIN TIME: CPT

## 2021-01-25 NOTE — PROGRESS NOTES
Mr. Ben Alfonso is a 46 y.o. y/o male with history of Heart Valve Replacement who presents today for anticoagulation monitoring and adjustment. Pertinent PMH: HTN, PVD,   Pt allergic to sun so will be on prednisone in the summer   Pt has been on warfarin since ~2015  Hx pt of [facility] clinic. Patient Reported Findings:  Yes     No  [x]   []       Patient verifies current dosing regimen as listed pt normal weekly dose has been 10 mg on Mon and Fri and 5 mg all other days of the week ---> confirms dose   []   [x]       S/S bleeding/bruising/swelling/SOB-   []   [x]       Blood in urine or stool  []   [x]       Procedures scheduled in the future at this time  []   [x]       Missed Dose - denies  []   [x]       Extra Dose- denies   [x]   []       Change in medications Takes prednisone tapers through summer months when has flare up of reaction (40 mg taper x 16 days) --> took Unisom to help sleep --> took Excedrin for migraine     []   [x]       Change in health/diet/appetite states that he will eat vit k occasionally every few weeks ---> no changes, no NVD  [x]   []       Change in alcohol use stopped drinking alcohol for a few weeks  []   [x]       Change in activity  []   [x]       Hospital admission  []   [x]       Emergency department visit  []   [x]       Other complaints      Clinical Outcomes:  Yes     No  []   [x]       Major bleeding event  []   [x]       Thromboembolic event  Uses 5 mg tablets   Duration of warfarin Therapy: indefinite  INR Range:  2.5-3.5      INR is 3 today  Continue to take 10 mg on Mon and Fri and 5 mg all other days of the week. Encouraged to maintain a consistency of vegetables/salads.   Return to clinic in 4 weeks, 2/22    Referring Dr. Nii Kinsey   INR (no units)   Date Value   12/21/2020 4.4   11/23/2020 3.2   10/26/2020 3.3   10/02/2020 3.3   04/30/2020 3.29 (H)   09/18/2014 1.94 (H)   08/14/2014 2.46 (H)   08/13/2014 1.36 (H) CLINICAL PHARMACY CONSULT: MED RECONCILIATION/REVIEW ADDENDUM    For Pharmacy Admin Tracking Only    PHSO: No  Total # of Interventions Recommended: 1  - Decreased Dose #: 1  - Maintenance Safety Lab Monitoring #: 1  Total Interventions Accepted: 1  Time Spent (min): 15    Yariel GrandaD

## 2021-02-22 ENCOUNTER — ANTI-COAG VISIT (OUTPATIENT)
Dept: PHARMACY | Age: 51
End: 2021-02-22
Payer: COMMERCIAL

## 2021-02-22 VITALS — TEMPERATURE: 96.8 F

## 2021-02-22 DIAGNOSIS — Z95.2 S/P AVR (AORTIC VALVE REPLACEMENT): ICD-10-CM

## 2021-02-22 DIAGNOSIS — Z79.01 LONG TERM (CURRENT) USE OF ANTICOAGULANTS: ICD-10-CM

## 2021-02-22 LAB — INTERNATIONAL NORMALIZATION RATIO, POC: 3.5

## 2021-02-22 PROCEDURE — 99211 OFF/OP EST MAY X REQ PHY/QHP: CPT

## 2021-02-22 PROCEDURE — 85610 PROTHROMBIN TIME: CPT

## 2021-02-22 NOTE — PROGRESS NOTES
Mr. Yanet Michaud is a 46 y.o. y/o male with history of Heart Valve Replacement who presents today for anticoagulation monitoring and adjustment. Pertinent PMH: HTN, PVD,   Pt allergic to sun so will be on prednisone in the summer   Pt has been on warfarin since ~2015  Hx pt of [facility] clinic. Patient Reported Findings:  Yes     No  [x]   []       Patient verifies current dosing regimen as listed pt normal weekly dose has been 10 mg on Mon and Fri and 5 mg all other days of the week ---> confirms dose   []   [x]       S/S bleeding/bruising/swelling/SOB-   []   [x]       Blood in urine or stool  []   [x]       Procedures scheduled in the future at this time  []   [x]       Missed Dose - denies  []   [x]       Extra Dose- denies   [x]   []       Change in medications Takes prednisone tapers through summer months when has flare up of reaction (40 mg taper x 16 days) --> took Unisom to help sleep --> took Excedrin for migraine   --> no changes  []   [x]       Change in health/diet/appetite states that he will eat vit k occasionally every few weeks ---> no changes, no NVD  [x]   []       Change in alcohol use stopped drinking alcohol for a few weeks  []   [x]       Change in activity  []   [x]       Hospital admission  []   [x]       Emergency department visit  []   [x]       Other complaints      Clinical Outcomes:  Yes     No  []   [x]       Major bleeding event  []   [x]       Thromboembolic event  Uses 5 mg tablets   Duration of warfarin Therapy: indefinite  INR Range:  2.5-3.5      INR is 3.5 today  Continue to take 10 mg on Mon and Fri and 5 mg all other days of the week. Encouraged to maintain a consistency of vegetables/salads. Patient would like to try 6 weeks, explained that we would need to work our way to 6 weeks.    Return to clinic in 5 weeks, 3/29    Referring Dr. Eder Deng   INR (no units)   Date Value   02/22/2021 3.5   01/25/2021 3   12/21/2020 4.4   11/23/2020 3.2   04/30/2020 3.29 (H)   09/18/2014 1.94 (H)   08/14/2014 2.46 (H)   08/13/2014 1.36 (H)         CLINICAL PHARMACY CONSULT: MED RECONCILIATION/REVIEW ADDENDUM    For Pharmacy Admin Tracking Only    PHSO: No  Total # of Interventions Recommended: 0  - Maintenance Safety Lab Monitoring #: 1  Total Interventions Accepted: 0  Time Spent (min): 15    Yariel MicheleD

## 2021-03-04 ENCOUNTER — TELEPHONE (OUTPATIENT)
Dept: PHARMACY | Age: 51
End: 2021-03-04

## 2021-03-04 NOTE — TELEPHONE ENCOUNTER
----- Message from Do Amador sent at 3/4/2021  1:40 PM EST -----  Regarding: New medications  Contact: Patient  Please call patient back regarding new medications. [phone]. Patient has been prescribed Loratadine 10mg daily and Cephalexin 500mg three times per day for 10 days.

## 2021-03-04 NOTE — TELEPHONE ENCOUNTER
Called patient back to explain no interaction.     Soraya Morillo, PharmD, [facility]

## 2021-03-09 RX ORDER — ASPIRIN 81 MG/1
81 TABLET ORAL DAILY
Qty: 90 TABLET | Refills: 3 | Status: SHIPPED | OUTPATIENT
Start: 2021-03-09 | End: 2021-03-30 | Stop reason: SDUPTHER

## 2021-03-09 NOTE — TELEPHONE ENCOUNTER
Please let patient know that he should only be on 81 mg of aspirin and warfarin.  325 mg of aspirin will only increase his risk of bleeding

## 2021-03-09 NOTE — TELEPHONE ENCOUNTER
Last office visit 09/11/2020  Next office visit none on file - was told to follow up in 1 year with Dr Ginny Youssef

## 2021-03-09 NOTE — TELEPHONE ENCOUNTER
Spoke with Bárbara at [facility], notified of DKW message, verbalized understanding.

## 2021-03-29 ENCOUNTER — ANTI-COAG VISIT (OUTPATIENT)
Dept: PHARMACY | Age: 51
End: 2021-03-29
Payer: COMMERCIAL

## 2021-03-29 DIAGNOSIS — Z95.2 S/P AVR (AORTIC VALVE REPLACEMENT): ICD-10-CM

## 2021-03-29 DIAGNOSIS — Z79.01 LONG TERM (CURRENT) USE OF ANTICOAGULANTS: ICD-10-CM

## 2021-03-29 LAB — INTERNATIONAL NORMALIZATION RATIO, POC: 3

## 2021-03-29 PROCEDURE — 99211 OFF/OP EST MAY X REQ PHY/QHP: CPT

## 2021-03-29 PROCEDURE — 85610 PROTHROMBIN TIME: CPT

## 2021-03-29 NOTE — PROGRESS NOTES
Mr. Jitendra Bland is a 46 y.o. y/o male with history of Heart Valve Replacement who presents today for anticoagulation monitoring and adjustment. Pertinent PMH: HTN, PVD,   Pt allergic to sun so will be on prednisone in the summer   Pt has been on warfarin since ~2015  Hx pt of [facility] clinic. Patient Reported Findings:  Yes     No  [x]   []       Patient verifies current dosing regimen as listed pt normal weekly dose has been 10 mg on Mon and Fri and 5 mg all other days of the week ---> confirms dose   []   [x]       S/S bleeding/bruising/swelling/SOB-   []   [x]       Blood in urine or stool  []   [x]       Procedures scheduled in the future at this time  []   [x]       Missed Dose - denies  []   [x]       Extra Dose- denies   [x]   []       Change in medications Takes prednisone tapers through summer months when has flare up of reaction (40 mg taper x 16 days) --> took Unisom to help sleep --> took Excedrin for migraine   --> lowered asa to 81 mg   []   [x]       Change in health/diet/appetite states that he will eat vit k occasionally every few weeks ---> no changes, no NVD  []   [x]       Change in alcohol use stopped drinking alcohol for a few weeks --> no changes   []   [x]       Change in activity  []   [x]       Hospital admission  []   [x]       Emergency department visit  []   [x]       Other complaints      Clinical Outcomes:  Yes     No  []   [x]       Major bleeding event  []   [x]       Thromboembolic event  Uses 5 mg tablets   Duration of warfarin Therapy: indefinite  INR Range:  2.5-3.5      INR is 3 today  Continue to take 10 mg on Mon and Fri and 5 mg all other days of the week. Encouraged to maintain a consistency of vegetables/salads.   Return to clinic in 6 weeks, 5/10    Referring Dr. Millie Herndon   INR (no units)   Date Value   02/22/2021 3.5   01/25/2021 3   12/21/2020 4.4   11/23/2020 3.2   04/30/2020 3.29 (H)   09/18/2014 1.94 (H)   08/14/2014 2.46 (H)   08/13/2014 1.36 (H)         CLINICAL PHARMACY CONSULT: MED RECONCILIATION/REVIEW ADDENDUM    For Pharmacy Admin Tracking Only    PHSO: No  Total # of Interventions Recommended: 0  - Maintenance Safety Lab Monitoring #: 1  Total Interventions Accepted: 0  Time Spent (min): 15    Jeison Bello, YarielD

## 2021-03-30 ENCOUNTER — TELEPHONE (OUTPATIENT)
Dept: CARDIOLOGY CLINIC | Age: 51
End: 2021-03-30

## 2021-03-30 DIAGNOSIS — I48.91 ATRIAL FIBRILLATION, UNSPECIFIED TYPE (HCC): Primary | ICD-10-CM

## 2021-03-30 RX ORDER — ASPIRIN 81 MG/1
81 TABLET ORAL DAILY
Qty: 90 TABLET | Refills: 3 | Status: SHIPPED | OUTPATIENT
Start: 2021-03-30 | End: 2022-04-18

## 2021-05-10 ENCOUNTER — ANTI-COAG VISIT (OUTPATIENT)
Dept: PHARMACY | Age: 51
End: 2021-05-10
Payer: COMMERCIAL

## 2021-05-10 DIAGNOSIS — Z95.2 S/P AVR (AORTIC VALVE REPLACEMENT): ICD-10-CM

## 2021-05-10 DIAGNOSIS — Z79.01 LONG TERM (CURRENT) USE OF ANTICOAGULANTS: ICD-10-CM

## 2021-05-10 LAB — INTERNATIONAL NORMALIZATION RATIO, POC: 3.2

## 2021-05-10 PROCEDURE — 85610 PROTHROMBIN TIME: CPT

## 2021-05-10 PROCEDURE — 99211 OFF/OP EST MAY X REQ PHY/QHP: CPT

## 2021-05-10 NOTE — PROGRESS NOTES
Mr. Trina Adhikari is a 46 y.o. y/o male with history of Heart Valve Replacement who presents today for anticoagulation monitoring and adjustment. Pertinent PMH: HTN, PVD,   Pt allergic to sun so will be on prednisone in the summer   Pt has been on warfarin since ~2015  Hx pt of [facility] clinic. Patient Reported Findings:  Yes     No  [x]   []       Patient verifies current dosing regimen as listed pt normal weekly dose has been 10 mg on Mon and Fri and 5 mg all other days of the week ---> confirms dose   []   [x]       S/S bleeding/bruising/swelling/SOB-   []   [x]       Blood in urine or stool  []   [x]       Procedures scheduled in the future at this time  []   [x]       Missed Dose - denies  []   [x]       Extra Dose- denies   []   [x]       Change in medications Takes prednisone tapers through summer months when has flare up of reaction (40 mg taper x 16 days) --> took Unisom to help sleep --> took Excedrin for migraine   --> lowered asa to 81 mg --> no changes   []   [x]       Change in health/diet/appetite states that he will eat vit k occasionally every few weeks ---> no changes, no NVD  []   [x]       Change in alcohol use stopped drinking alcohol for a few weeks --> no changes   []   [x]       Change in activity  []   [x]       Hospital admission  []   [x]       Emergency department visit  []   [x]       Other complaints      Clinical Outcomes:  Yes     No  []   [x]       Major bleeding event  []   [x]       Thromboembolic event  Uses 5 mg tablets   Duration of warfarin Therapy: indefinite  INR Range:  2.5-3.5      INR is 3.2 today  Continue to take 10 mg on Mon and Fri and 5 mg all other days of the week. Encouraged to maintain a consistency of vegetables/salads.    Return to clinic in 6 weeks, 6/21    Referring Dr. Jamar Arevaol   INR (no units)   Date Value   03/29/2021 3   02/22/2021 3.5   01/25/2021 3   12/21/2020 4.4   04/30/2020 3.29 (H)   09/18/2014 1.94 (H)   08/14/2014 2.46 (H) 08/13/2014 1.36 (H)       For Pharmacy Admin Tracking Only     Total # of Interventions Recommended: 0   Total # of Interventions Accepted: 0   Time Spent (min): 15

## 2021-06-21 ENCOUNTER — ANTI-COAG VISIT (OUTPATIENT)
Dept: PHARMACY | Age: 51
End: 2021-06-21
Payer: COMMERCIAL

## 2021-06-21 DIAGNOSIS — Z79.01 LONG TERM (CURRENT) USE OF ANTICOAGULANTS: ICD-10-CM

## 2021-06-21 DIAGNOSIS — Z95.2 S/P AVR (AORTIC VALVE REPLACEMENT): Primary | ICD-10-CM

## 2021-06-21 LAB — INTERNATIONAL NORMALIZATION RATIO, POC: 2.4

## 2021-06-21 PROCEDURE — 85610 PROTHROMBIN TIME: CPT

## 2021-06-21 PROCEDURE — 99211 OFF/OP EST MAY X REQ PHY/QHP: CPT

## 2021-06-21 NOTE — PROGRESS NOTES
Mr. Trinity Sandoval is a 46 y.o. y/o male with history of Heart Valve Replacement who presents today for anticoagulation monitoring and adjustment. Pertinent PMH: HTN, PVD,   Pt allergic to sun so will be on prednisone in the summer   Pt has been on warfarin since ~2015  Hx pt of [facility] clinic. Patient Reported Findings:  Yes     No  [x]   []       Patient verifies current dosing regimen as listed pt normal weekly dose has been 10 mg on Mon and Fri and 5 mg all other days of the week ---> confirms dose   []   [x]       S/S bleeding/bruising/swelling/SOB-   []   [x]       Blood in urine or stool  []   [x]       Procedures scheduled in the future at this time  [x]   []       Missed Dose - might have missed a couple doses 2 weeks ago with acute family stress  []   [x]       Extra Dose- denies   [x]   []       Change in medications Takes prednisone tapers through summer months when has flare up of reaction (40 mg taper x 16 days) --> took Unisom to help sleep --> took Excedrin for migraine   --> lowered asa to 81 mg --> is on prednisone this summer (40 mg then taper)  []   [x]       Change in health/diet/appetite states that he will eat vit k occasionally every few weeks ---> no changes, no NVD  []   [x]       Change in alcohol use stopped drinking alcohol for a few weeks --> no changes   []   [x]       Change in activity  []   [x]       Hospital admission  []   [x]       Emergency department visit  []   [x]       Other complaints      Clinical Outcomes:  Yes     No  []   [x]       Major bleeding event  []   [x]       Thromboembolic event  Uses 5 mg tablets   Duration of warfarin Therapy: indefinite  INR Range:  2.5-3.5      INR is 2.4 today  Continue to take 10 mg on Mon and Fri and 5 mg all other days of the week. Encouraged to maintain a consistency of vegetables/salads.    Return to clinic in 6 weeks, 8/2     Referring Dr. Delilah Guillen   INR (no units)   Date Value   05/10/2021 3.2   03/29/2021 3 02/22/2021 3.5   01/25/2021 3   04/30/2020 3.29 (H)   09/18/2014 1.94 (H)   08/14/2014 2.46 (H)   08/13/2014 1.36 (H)       For Pharmacy Admin Tracking Only     Total # of Interventions Recommended: 0   Total # of Interventions Accepted: 0   Time Spent (min): 15

## 2021-08-02 ENCOUNTER — ANTI-COAG VISIT (OUTPATIENT)
Dept: PHARMACY | Age: 51
End: 2021-08-02
Payer: COMMERCIAL

## 2021-08-02 DIAGNOSIS — Z95.2 S/P AVR (AORTIC VALVE REPLACEMENT): Primary | ICD-10-CM

## 2021-08-02 DIAGNOSIS — Z79.01 LONG TERM (CURRENT) USE OF ANTICOAGULANTS: ICD-10-CM

## 2021-08-02 LAB — INTERNATIONAL NORMALIZATION RATIO, POC: 5.1

## 2021-08-02 PROCEDURE — 85610 PROTHROMBIN TIME: CPT

## 2021-08-02 PROCEDURE — 99211 OFF/OP EST MAY X REQ PHY/QHP: CPT

## 2021-08-02 NOTE — PROGRESS NOTES
Mr. Indira Soto is a 46 y.o. y/o male with history of Heart Valve Replacement who presents today for anticoagulation monitoring and adjustment. Pertinent PMH: HTN, PVD,   Pt allergic to sun so will be on prednisone in the summer   Pt has been on warfarin since ~2015  Hx pt of [facility] clinic. Patient Reported Findings:  Yes     No  [x]   []       Patient verifies current dosing regimen as listed pt normal weekly dose has been 10 mg on Mon and Fri and 5 mg all other days of the week ---> confirms dose   []   [x]       S/S bleeding/bruising/swelling/SOB-   []   [x]       Blood in urine or stool  []   [x]       Procedures scheduled in the future at this time  []   [x]       Missed Dose - denies    []   [x]       Extra Dose- denies   [x]   []       Change in medications Takes prednisone tapers through summer months when has flare up of reaction (40 mg taper x 16 days) --> took Unisom to help sleep --> took Excedrin for migraine   --> lowered asa to 81 mg --> is on prednisone this summer (40 mg then taper) --> had prednisone, cortisone shots, and ibuprofen a few nights to help with GOUT attack a few weeks ago. Has stopped all meds above    []   [x]       Change in health/diet/appetite states that he will eat vit k occasionally every few weeks ---> no changes, no NVD  [x]   []       Change in alcohol use stopped drinking alcohol for a few weeks --> no changes --> no alcohol for a few weeks d/t gout    []   [x]       Change in activity  []   [x]       Hospital admission  []   [x]       Emergency department visit  []   [x]       Other complaints      Clinical Outcomes:  Yes     No  []   [x]       Major bleeding event  []   [x]       Thromboembolic event  Uses 5 mg tablets   Duration of warfarin Therapy: indefinite  INR Range:  2.5-3.5      INR is 5.1 today after acute med changes from gout attack   Hold dose tonight then continue to take 10 mg on Mon and Fri and 5 mg all other days of the week. Encouraged to maintain a consistency of vegetables/salads.    Return to clinic in 3 weeks, 8/23 since out of town week prior     Referring Dr. Lisbeth Alcocer   INR (no units)   Date Value   06/21/2021 2.4   05/10/2021 3.2   03/29/2021 3   02/22/2021 3.5   04/30/2020 3.29 (H)   09/18/2014 1.94 (H)   08/14/2014 2.46 (H)   08/13/2014 1.36 (H)     For Pharmacy Admin Tracking Only     Intervention Detail: Dose Adjustment: 1, reason: Interaction   Total # of Interventions Recommended: 1   Total # of Interventions Accepted: 1   Time Spent (min): 15

## 2021-08-23 RX ORDER — WARFARIN SODIUM 5 MG/1
TABLET ORAL
Qty: 108 TABLET | Refills: 2 | Status: SHIPPED | OUTPATIENT
Start: 2021-08-23 | End: 2022-03-17

## 2021-08-24 ENCOUNTER — TELEPHONE (OUTPATIENT)
Dept: CARDIOLOGY CLINIC | Age: 51
End: 2021-08-24

## 2021-08-24 DIAGNOSIS — Z95.2 AORTIC VALVE REPLACED: Primary | ICD-10-CM

## 2021-08-25 ENCOUNTER — TELEPHONE (OUTPATIENT)
Dept: PHARMACY | Age: 51
End: 2021-08-25

## 2021-08-30 NOTE — TELEPHONE ENCOUNTER
Spoke with patient. He wanted to reschedule his missed appt for 9/6 (labor day) explained the clinic was closed. Patient said then,  It would need to be 3 Mondays from today, \"I'm going OOT and I have a really tight schedule right now. Patient agreed to call me tomorrow to work something out for an appt.

## 2021-09-03 ENCOUNTER — ANTI-COAG VISIT (OUTPATIENT)
Dept: PHARMACY | Age: 51
End: 2021-09-03
Payer: COMMERCIAL

## 2021-09-03 DIAGNOSIS — Z95.2 S/P AVR (AORTIC VALVE REPLACEMENT): Primary | ICD-10-CM

## 2021-09-03 DIAGNOSIS — Z79.01 LONG TERM (CURRENT) USE OF ANTICOAGULANTS: ICD-10-CM

## 2021-09-03 LAB — INTERNATIONAL NORMALIZATION RATIO, POC: 2.9

## 2021-09-03 PROCEDURE — 99211 OFF/OP EST MAY X REQ PHY/QHP: CPT

## 2021-09-03 PROCEDURE — 85610 PROTHROMBIN TIME: CPT

## 2021-09-03 NOTE — PROGRESS NOTES
Mr. Americo Cardoso is a 46 y.o. y/o male with history of Heart Valve Replacement who presents today for anticoagulation monitoring and adjustment. Pertinent PMH: HTN, PVD,   Pt allergic to sun so will be on prednisone in the summer   Pt has been on warfarin since ~2015  Hx pt of [facility] clinic. Patient Reported Findings:  Yes     No  [x]   []       Patient verifies current dosing regimen as listed pt normal weekly dose has been 10 mg on Mon and Fri and 5 mg all other days of the week ---> confirms dose   []   [x]       S/S bleeding/bruising/swelling/SOB-   []   [x]       Blood in urine or stool  []   [x]       Procedures scheduled in the future at this time  []   [x]       Missed Dose - denies    []   [x]       Extra Dose- denies   [x]   []       Change in medications Takes prednisone tapers through summer months when has flare up of reaction (40 mg taper x 16 days) --> took Unisom to help sleep --> took Excedrin for migraine   --> lowered asa to 81 mg --> is on prednisone this summer (40 mg then taper) --> had prednisone, cortisone shots, and ibuprofen a few nights to help with GOUT attack a few weeks ago.  Has stopped all meds above --> is going to restart medrol soon, is having another GOUT flare up but is trying to not use. wants to start allopurinol     []   [x]       Change in health/diet/appetite states that he will eat vit k occasionally every few weeks ---> no changes, no NVD  [x]   []       Change in alcohol use stopped drinking alcohol for a few weeks --> no changes --> no alcohol for a few weeks d/t gout    []   [x]       Change in activity  []   [x]       Hospital admission  []   [x]       Emergency department visit  []   [x]       Other complaints      Clinical Outcomes:  Yes     No  []   [x]       Major bleeding event  []   [x]       Thromboembolic event  Uses 5 mg tablets   Duration of warfarin Therapy: indefinite  INR Range:  2.5-3.5      INR is 2.9 today    Continue to take 10 mg on Mon and Fri and 5 mg all other days of the week. Advised to call if starts allopurinol or medrol dose victorino   Encouraged to maintain a consistency of vegetables/salads.    Return to clinic in 4 weeks, 10/1    Referring Dr. Elizabeth Kelly   INR (no units)   Date Value   08/02/2021 5.1   06/21/2021 2.4   05/10/2021 3.2   03/29/2021 3   04/30/2020 3.29 (H)   09/18/2014 1.94 (H)   08/14/2014 2.46 (H)   08/13/2014 1.36 (H)     For Pharmacy Admin Tracking Only     Intervention Detail: Dose Adjustment: 1, reason: Interaction   Total # of Interventions Recommended: 1   Total # of Interventions Accepted: 1   Time Spent (min): 15

## 2021-09-28 ENCOUNTER — HOSPITAL ENCOUNTER (OUTPATIENT)
Age: 51
Discharge: HOME | End: 2021-09-28
Payer: COMMERCIAL

## 2021-09-28 DIAGNOSIS — I48.91 ATRIAL FIBRILLATION, UNSPECIFIED TYPE (HCC): ICD-10-CM

## 2021-09-28 LAB
BASOPHILS ABSOLUTE: 0 K/UL (ref 0–0.2)
BASOPHILS RELATIVE PERCENT: 0.8 %
EOSINOPHILS ABSOLUTE: 0.1 K/UL (ref 0–0.6)
EOSINOPHILS RELATIVE PERCENT: 2.3 %
HCT VFR BLD CALC: 40.6 % (ref 40.5–52.5)
HEMOGLOBIN: 14.1 G/DL (ref 13.5–17.5)
LYMPHOCYTES ABSOLUTE: 1.8 K/UL (ref 1–5.1)
LYMPHOCYTES RELATIVE PERCENT: 30.2 %
MCH RBC QN AUTO: 32.6 PG (ref 26–34)
MCHC RBC AUTO-ENTMCNC: 34.8 G/DL (ref 31–36)
MCV RBC AUTO: 93.6 FL (ref 80–100)
MONOCYTES ABSOLUTE: 0.5 K/UL (ref 0–1.3)
MONOCYTES RELATIVE PERCENT: 9.1 %
NEUTROPHILS ABSOLUTE: 3.4 K/UL (ref 1.7–7.7)
NEUTROPHILS RELATIVE PERCENT: 57.6 %
PDW BLD-RTO: 13.9 % (ref 12.4–15.4)
PLATELET # BLD: 202 K/UL (ref 135–450)
PMV BLD AUTO: 8.9 FL (ref 5–10.5)
RBC # BLD: 4.33 M/UL (ref 4.2–5.9)
WBC # BLD: 5.9 K/UL (ref 4–11)

## 2021-09-28 PROCEDURE — 36415 COLL VENOUS BLD VENIPUNCTURE: CPT

## 2021-09-28 PROCEDURE — 85025 COMPLETE CBC W/AUTO DIFF WBC: CPT

## 2021-09-29 ENCOUNTER — TELEPHONE (OUTPATIENT)
Dept: CARDIOLOGY CLINIC | Age: 51
End: 2021-09-29

## 2021-09-29 NOTE — TELEPHONE ENCOUNTER
----- Message from Ivan Garcia DO sent at 9/29/2021  9:21 AM EDT -----  Please let Radha Stellawale know that his complete blood count is normal. Thanks!

## 2021-09-30 ENCOUNTER — OFFICE VISIT (OUTPATIENT)
Dept: CARDIOLOGY CLINIC | Age: 51
End: 2021-09-30
Payer: COMMERCIAL

## 2021-09-30 ENCOUNTER — HOSPITAL ENCOUNTER (OUTPATIENT)
Dept: NON INVASIVE DIAGNOSTICS | Age: 51
Discharge: HOME | End: 2021-09-30
Payer: COMMERCIAL

## 2021-09-30 ENCOUNTER — ANTI-COAG VISIT (OUTPATIENT)
Dept: PHARMACY | Age: 51
End: 2021-09-30
Payer: COMMERCIAL

## 2021-09-30 DIAGNOSIS — E66.3 OVERWEIGHT: ICD-10-CM

## 2021-09-30 DIAGNOSIS — Z95.2 S/P AVR (AORTIC VALVE REPLACEMENT): Primary | ICD-10-CM

## 2021-09-30 DIAGNOSIS — Z79.01 LONG TERM (CURRENT) USE OF ANTICOAGULANTS: ICD-10-CM

## 2021-09-30 DIAGNOSIS — I10 ESSENTIAL HYPERTENSION: ICD-10-CM

## 2021-09-30 DIAGNOSIS — E78.2 MIXED HYPERLIPIDEMIA: ICD-10-CM

## 2021-09-30 DIAGNOSIS — Z95.2 AORTIC VALVE REPLACED: ICD-10-CM

## 2021-09-30 DIAGNOSIS — Z95.2 S/P AORTIC VALVE REPLACEMENT: ICD-10-CM

## 2021-09-30 DIAGNOSIS — Z95.2 AORTIC VALVE REPLACED: Primary | ICD-10-CM

## 2021-09-30 LAB
INTERNATIONAL NORMALIZATION RATIO, POC: 2.1
LV EF: 58 %
LVEF MODALITY: NORMAL

## 2021-09-30 PROCEDURE — 3017F COLORECTAL CA SCREEN DOC REV: CPT | Performed by: INTERNAL MEDICINE

## 2021-09-30 PROCEDURE — G8417 CALC BMI ABV UP PARAM F/U: HCPCS | Performed by: INTERNAL MEDICINE

## 2021-09-30 PROCEDURE — 93306 TTE W/DOPPLER COMPLETE: CPT

## 2021-09-30 PROCEDURE — 1036F TOBACCO NON-USER: CPT | Performed by: INTERNAL MEDICINE

## 2021-09-30 PROCEDURE — 99214 OFFICE O/P EST MOD 30 MIN: CPT | Performed by: INTERNAL MEDICINE

## 2021-09-30 PROCEDURE — G8427 DOCREV CUR MEDS BY ELIG CLIN: HCPCS | Performed by: INTERNAL MEDICINE

## 2021-09-30 PROCEDURE — 99211 OFF/OP EST MAY X REQ PHY/QHP: CPT

## 2021-09-30 PROCEDURE — 85610 PROTHROMBIN TIME: CPT

## 2021-09-30 PROCEDURE — 93000 ELECTROCARDIOGRAM COMPLETE: CPT | Performed by: INTERNAL MEDICINE

## 2021-09-30 RX ORDER — AMOXICILLIN 500 MG/1
500 CAPSULE ORAL SEE ADMIN INSTRUCTIONS
Qty: 4 CAPSULE | Refills: 3 | Status: SHIPPED | OUTPATIENT
Start: 2021-09-30

## 2021-09-30 RX ORDER — ATORVASTATIN CALCIUM 40 MG/1
40 TABLET, FILM COATED ORAL DAILY
Qty: 90 TABLET | Refills: 3 | Status: SHIPPED | OUTPATIENT
Start: 2021-09-30 | End: 2022-09-23

## 2021-09-30 RX ORDER — LISINOPRIL 10 MG/1
10 TABLET ORAL DAILY
Qty: 90 TABLET | Refills: 3 | Status: SHIPPED | OUTPATIENT
Start: 2021-09-30 | End: 2022-03-17 | Stop reason: SDUPTHER

## 2021-09-30 NOTE — LETTER
[address] [place] [address]. [address]  Phone: [phone]  Fax: [address], DO    October 5, 2021     Dot Jaimes, [address] [facility] [address] [place], [place] [address] [facility]    Patient: Milagro Rodriguez III   MR Number: [id number]   YOB: 1970   Date of Visit: 9/30/2021       Dear Dot Jaimes:    Thank you for referring Aleah Mays to me for evaluation/treatment. Below are the relevant portions of my assessment and plan of care. If you have questions, please do not hesitate to call me. I look forward to following Pasquale Gonzales along with you.     Sincerely,      Lilly Streeter, DO

## 2021-09-30 NOTE — PROGRESS NOTES
Mr. Cady Castro is a 46 y.o. y/o male with history of Heart Valve Replacement who presents today for anticoagulation monitoring and adjustment. Pertinent PMH: HTN, PVD,   Pt allergic to sun so will be on prednisone in the summer   Pt has been on warfarin since ~2015  Hx pt of [facility]. Patient Reported Findings:  Yes     No  [x]   []       Patient verifies current dosing regimen as listed pt normal weekly dose has been 10 mg on Mon and Fri and 5 mg all other days of the week ---> confirms dose   []   [x]       S/S bleeding/bruising/swelling/SOB-   []   [x]       Blood in urine or stool  []   [x]       Procedures scheduled in the future at this time  []   [x]       Missed Dose - 5mg 9/27   []   [x]       Extra Dose- denies   [x]   []       Change in medications Takes prednisone tapers through summer months when has flare up of reaction (40 mg taper x 16 days) --> took Unisom to help sleep --> took Excedrin for migraine   --> lowered asa to 81 mg --> is on prednisone this summer (40 mg then taper) --> had prednisone, cortisone shots, and ibuprofen a few nights to help with GOUT attack a few weeks ago.  Has stopped all meds above --> is going to restart medrol soon, is having another GOUT flare up but is trying to not use. wants to start allopurinol --> 6 day prednisone taper started 9/29     []   [x]       Change in health/diet/appetite states that he will eat vit k occasionally every few weeks ---> no changes, no NVD   [x]   []       Change in alcohol use stopped drinking alcohol for a few weeks --> no changes --> no alcohol for a few weeks d/t gout    []   [x]       Change in activity  []   [x]       Hospital admission  []   [x]       Emergency department visit  []   [x]       Other complaints      Clinical Outcomes:  Yes     No  []   [x]       Major bleeding event  []   [x]       Thromboembolic event  Uses 5 mg tablets   Duration of warfarin Therapy: indefinite  INR Range:  2.5-3.5 INR is 2.1 today d/t dose adjusting himself on 9/27 d/t upcoming prednisone taper which was started 9/29  Allopurinol / Medrol no started  As patient was low d/t missed dose and only just now started prednisone taper, will maintain dosing as prednisone should increase INR. Patient normally stable on this dose when diet / medications are consistent. Continue to take 10 mg on Mon and Fri and 5 mg all other days of the week. Encouraged to maintain a consistency of vegetables/salads.    Return to clinic in 4 weeks, 10/28    Referring Dr. Brandon Lazar   INR (no units)   Date Value   09/30/2021 2.1   09/03/2021 2.9   08/02/2021 5.1   06/21/2021 2.4   04/30/2020 3.29 (H)   09/18/2014 1.94 (H)   08/14/2014 2.46 (H)   08/13/2014 1.36 (H)     For Pharmacy Admin Tracking Only     Total # of Interventions Recommended: 0   Total # of Interventions Accepted: 0   Time Spent (min): 15

## 2021-09-30 NOTE — PROGRESS NOTES
fatigue and fever. HENT: Negative for congestion and ear discharge. Eyes: Negative for photophobia and visual disturbance. Respiratory: Negative for cough, chest tightness and shortness of breath. Cardiovascular: Negative for chest pain and palpitations. Gastrointestinal: Negative for abdominal distention, abdominal pain, blood in stool and nausea. Endocrine: Negative for cold intolerance and polydipsia. Genitourinary: Negative for difficulty urinating and flank pain. Musculoskeletal: Positive for arthralgias and myalgias. Skin: Negative for rash and wound. Allergic/Immunologic: Negative for environmental allergies and immunocompromised state. Neurological: Negative for dizziness and headaches. Hematological: Negative for adenopathy. Does not bruise/bleed easily. Psychiatric/Behavioral: Negative for confusion. The patient is not hyperactive. MEDICATIONS      Prior to Admission medications    Medication Sig Start Date End Date Taking? Authorizing Provider   warfarin (COUMADIN) 5 MG tablet Take 2 tablets by mouth Twice a Week AND 1 tablet Five times weekly. Take 2 tablets on Mon and Fri and 1 tablet all other days of the week.  8/23/21 11/21/21 Yes Philly Lopez DO   aspirin 81 MG EC tablet Take 1 tablet by mouth daily 3/30/21  Yes Philly Lopez DO   lisinopril (PRINIVIL;ZESTRIL) 10 MG tablet TAKE 1 TABLET BY MOUTH EVERY DAY 11/6/20  Yes Daria Mcclendon MD   metoprolol tartrate (LOPRESSOR) 25 MG tablet TAKE 1 TABLET BY MOUTH TWICE A DAY 11/6/20  Yes Daria Mcclendon MD   atorvastatin (LIPITOR) 40 MG tablet TAKE 1 TABLET BY MOUTH EVERY DAY 11/6/20  Yes Daria Mcclendon MD   Apoaequorin (PREVAGEN EXTRA STRENGTH) 20 MG CAPS Take 20 mg by mouth daily  Patient not taking: Reported on 9/30/2021    Historical Provider, MD   predniSONE (DELTASONE) 10 MG tablet Take 10-40 mg by mouth daily Seasonal (Summer only)  Patient not taking: Reported on 9/30/2021 5/22/19   Historical Provider, MD Cholecalciferol (VITAMIN D-3 PO) Take by mouth once a week  Patient not taking: Reported on 9/30/2021    Historical Provider, MD       PHYSICAL EXAM        Vitals:    09/30/21 1008   BP: (!) 138/98   Pulse: 71   SpO2: 97%    Weight: 269 lb (122 kg)     Gen Alert, cooperative, no distress Heart  Regular rate and rhythm, click, 1/6 systolic murmur   Head Normocephalic, atraumatic, no abnormalities Abd  Soft, NT, +BS, no mass, no OM   Eyes PERRLA, conj/corn clear Ext  Ext nl, AT, no C/C, no edema   Nose Nares normal, no drain age, Non-tender Pulse 2+ and symmetric   Throat Lips, mucosa, tongue normal Skin Color/text/turg nl, +sun-induced rash   Neck S/S, TM, NT, no bruit Psych Nl mood and affect   Lung CTA-B, unlabored, no DTP     Ch wall NT, sternotomy        LABS and Imaging     Relevant and available CV data reviewed  Echo/MRI: 9/30/2021  Pending  2019  Echo Aorta  The aortic root and ascending aorta are normal in size. Aortic Root: 3 cm   Ascending Aorta: 3.5 cm  Cath: 2014  Normal cornaries  Holter  none  EKG: sinus rhythm, rsr'. Personally interpreted 9/30/2021  Stress: none  moderate Risk  moderate Complexity/Medical Decision Making  Outside records Reviewed  Labs Reviewed  Prior Imaging, ekg, cath, echo reviewed when available  Medications reviewed  Old Notes reviewed  ASSESSMENT AND PLAN     1. Mechanical aortic valve for bicuspid aortic stenosis   - bicuspid AV with replacement  - on coumadin-- managed at [facility]  - Normal coronaries  -stable  Plan  - echo today will call with results  - amoxicillin 2 gm one hour prior dental work. Discussed importance of regular dental work and seeking medical attention for fever  - recommend family members be screened for bicuspid valve   - will ask sister what her cardiac surgical history is, consider cta to evaluate aorta  - continue warfarin and aspirin     2.  Essential Hypertension  - 138/98  - on lisinopril 10 mg dialy  - on lopressor 25 mg bid  - goal < 130/80  - above goal  Plan  - continue all medications  - will work on weight loss  - increase lisinopril if elevated at next visit     3. Mixed Hyperlipidemia  - 9/4/2020  Tc 194  hdl 46  ldl 112  Tri 179  - on lipitor 40 mg daily  - above goal   Plan  - continue lipitor  - repeat lipid panel with next INR    4. Overweight  - BMI 37.52  Plan  - goal weight next visit 250      Patient counseled on lifestyle modification, diet, and exercise. Follow Up: One year    Dr. Kelly Lancaster:  I, Augustine Muñiz, am scribing for and in the presence of Cam Newell MD.   Coretta Owen 09/30/21 10:32 AM   Physician Attestation  The scribe for and in the presence of juliana Shen DO). The scribe Rayray Gupta may have prepopulated components of this note with my historical  intellectual property under my direct supervision. Any additions to this intellectual property were performed in my presence and at my direction. Furthermore, the content and accuracy of this note have been reviewed by juliana Shen DO).   10/5/2021 4:00 PM

## 2021-10-01 ENCOUNTER — TELEPHONE (OUTPATIENT)
Dept: CARDIOLOGY CLINIC | Age: 51
End: 2021-10-01

## 2021-10-01 NOTE — TELEPHONE ENCOUNTER
Called and spoke with patient and informed him of message below.  Please sign off on rx at patient's request. He will double the amount that he has

## 2021-10-01 NOTE — TELEPHONE ENCOUNTER
----- Message from Izella Collet, RN sent at 9/30/2021  5:06 PM EDT -----  Please let him know that his valve function is good, however his heart is showing effects of high blood pressure. His blood pressure was a little high.  Recommend increasing lisinopril to 20 mg daily per dkw

## 2021-10-04 VITALS
WEIGHT: 269 LBS | SYSTOLIC BLOOD PRESSURE: 136 MMHG | HEIGHT: 71 IN | BODY MASS INDEX: 37.66 KG/M2 | DIASTOLIC BLOOD PRESSURE: 96 MMHG | OXYGEN SATURATION: 97 % | HEART RATE: 71 BPM

## 2021-10-05 ASSESSMENT — ENCOUNTER SYMPTOMS
NAUSEA: 0
PHOTOPHOBIA: 0
BLOOD IN STOOL: 0
SHORTNESS OF BREATH: 0
CHEST TIGHTNESS: 0
ABDOMINAL DISTENTION: 0
ABDOMINAL PAIN: 0
COUGH: 0

## 2021-10-08 RX ORDER — LISINOPRIL 20 MG/1
20 TABLET ORAL DAILY
Qty: 90 TABLET | Refills: 1 | OUTPATIENT
Start: 2021-10-08

## 2021-11-08 ENCOUNTER — TELEPHONE (OUTPATIENT)
Dept: PHARMACY | Age: 51
End: 2021-11-08

## 2021-11-15 ENCOUNTER — ANTI-COAG VISIT (OUTPATIENT)
Dept: PHARMACY | Age: 51
End: 2021-11-15
Payer: COMMERCIAL

## 2021-11-15 DIAGNOSIS — Z95.2 S/P AVR (AORTIC VALVE REPLACEMENT): Primary | ICD-10-CM

## 2021-11-15 DIAGNOSIS — Z79.01 LONG TERM (CURRENT) USE OF ANTICOAGULANTS: ICD-10-CM

## 2021-11-15 LAB — INTERNATIONAL NORMALIZATION RATIO, POC: 3.4

## 2021-11-15 PROCEDURE — 85610 PROTHROMBIN TIME: CPT

## 2021-11-15 PROCEDURE — 99211 OFF/OP EST MAY X REQ PHY/QHP: CPT

## 2021-11-15 NOTE — PROGRESS NOTES
Mr. Kellee Patterson is a 46 y.o. y/o male with history of Heart Valve Replacement who presents today for anticoagulation monitoring and adjustment. Pertinent PMH: HTN, PVD,   Pt allergic to sun so will be on prednisone in the summer   Pt has been on warfarin since ~2015  Hx pt of [facility] clinic. Patient Reported Findings:  Yes     No  [x]   []       Patient verifies current dosing regimen as listed pt normal weekly dose has been 10 mg on Mon and Fri and 5 mg all other days of the week ---> confirms dose   []   [x]       S/S bleeding/bruising/swelling/SOB-   []   [x]       Blood in urine or stool  []   [x]       Procedures scheduled in the future at this time  []   [x]       Missed Dose - denies    []   [x]       Extra Dose- denies   [x]   []       Change in medications Takes prednisone tapers through summer months when has flare up of reaction (40 mg taper x 16 days) --> took Unisom to help sleep --> took Excedrin for migraine   --> lowered asa to 81 mg --> is on prednisone this summer (40 mg then taper) --> had prednisone, cortisone shots, and ibuprofen a few nights to help with GOUT attack a few weeks ago.  Has stopped all meds above --> is going to restart medrol soon, is having another GOUT flare up but is trying to not use. wants to start allopurinol --> 6 day prednisone taper started 9/29---> inc lisinopril      []   [x]       Change in health/diet/appetite states that he will eat vit k occasionally every few weeks ---> no changes, no NVD   [x]   []       Change in alcohol use stopped drinking alcohol for a few weeks --> no changes --> no alcohol for a few weeks d/t gout    []   [x]       Change in activity  []   [x]       Hospital admission  []   [x]       Emergency department visit  []   [x]       Other complaints      Clinical Outcomes:  Yes     No  []   [x]       Major bleeding event  []   [x]       Thromboembolic event  Uses 5 mg tablets   Duration of warfarin Therapy: indefinite  INR Range:  2.5-3.5      Was due back 10/28. INR is 3.4 today   Continue to take 10 mg on Mon and Fri and 5 mg all other days of the week. Encouraged to maintain a consistency of vegetables/salads.    Return to clinic in 4 weeks, 12/13    **consent form signed 11/15/2021    Referring Dr. Yessi Wong   INR (no units)   Date Value   11/15/2021 3.4   09/30/2021 2.1   09/03/2021 2.9   08/02/2021 5.1   04/30/2020 3.29 (H)   09/18/2014 1.94 (H)   08/14/2014 2.46 (H)   08/13/2014 1.36 (H)     For Pharmacy Admin Tracking Only     Total # of Interventions Recommended: 0   Total # of Interventions Accepted: 0   Time Spent (min): 15

## 2021-12-13 ENCOUNTER — TELEPHONE (OUTPATIENT)
Dept: PHARMACY | Age: 51
End: 2021-12-13

## 2022-01-04 NOTE — TELEPHONE ENCOUNTER
Called and spoke with patient who rescheduled for 1/5. He and his wife have been ill and he is starting on steroids today. States neither have COVID, they have been tested several times. Sinus infections. 4Started on steroids for a gout attack.

## 2022-01-05 NOTE — TELEPHONE ENCOUNTER
Pt LVM asking to cancel his appt w/ [facility] today, states his leg is locked up from gout. He'll call back to r/s when its feeling better.

## 2022-01-21 ENCOUNTER — ANTI-COAG VISIT (OUTPATIENT)
Dept: PHARMACY | Age: 52
End: 2022-01-21
Payer: COMMERCIAL

## 2022-01-21 DIAGNOSIS — Z95.2 S/P AVR (AORTIC VALVE REPLACEMENT): Primary | ICD-10-CM

## 2022-01-21 DIAGNOSIS — Z79.01 LONG TERM (CURRENT) USE OF ANTICOAGULANTS: ICD-10-CM

## 2022-01-21 LAB — INTERNATIONAL NORMALIZATION RATIO, POC: 4.2

## 2022-01-21 PROCEDURE — 85610 PROTHROMBIN TIME: CPT

## 2022-01-21 PROCEDURE — 99212 OFFICE O/P EST SF 10 MIN: CPT

## 2022-01-21 NOTE — PROGRESS NOTES
Mr. Shahram Maldonado is a 46 y.o. y/o male with history of Heart Valve Replacement who presents today for anticoagulation monitoring and adjustment. Pertinent PMH: HTN, PVD,   Pt allergic to sun so will be on prednisone in the summer   Pt has been on warfarin since ~2015  Hx pt of [facility] clinic. Patient Reported Findings:  Yes     No  [x]   []       Patient verifies current dosing regimen as listed pt normal weekly dose has been 10 mg on Mon and Fri and 5 mg all other days of the week ---> confirms dose   []   [x]       S/S bleeding/bruising/swelling/SOB-   []   [x]       Blood in urine or stool  []   [x]       Procedures scheduled in the future at this time  [x]   []       Missed Dose - unsure while ill the past few weeks    []   [x]       Extra Dose- denies   [x]   []       Change in medications Takes prednisone tapers through summer months when has flare up of reaction (40 mg taper x 16 days) --> 6 day prednisone taper started 9/29---> inc lisinopril --> medrol dose victorino 1/4 and then had another one. and colchicine.  Took ibuprofen, stopped last week    [x]   []       Change in health/diet/appetite states that he will eat vit k occasionally every few weeks ---> no changes, no NVD --> eating greens every day (green beans, lettuce, asparagus, broccoli) states that he has been doing this for long time   [x]   []       Change in alcohol use stopped drinking alcohol for a few weeks --> no changes --> no alcohol for a few weeks d/t gout --> had one beer beginning of year and caused another gout attack    []   [x]       Change in activity  []   [x]       Hospital admission  []   [x]       Emergency department visit  []   [x]       Other complaints      Clinical Outcomes:  Yes     No  []   [x]       Major bleeding event  []   [x]       Thromboembolic event  Uses 5 mg tablets   Duration of warfarin Therapy: indefinite  INR Range:  2.5-3.5      Was due back 12/13, continues to be noncompliant     INR is 4.2 today   Take 5 mg tonight then continue to take 10 mg on Mon and Fri and 5 mg all other days of the week. Encouraged to maintain a consistency of vegetables/salads.    Return to clinic in 2 weeks, 2/7    **consent form signed 11/15/2021    Referring Dr. Laila Tompkins   INR (no units)   Date Value   11/15/2021 3.4   09/30/2021 2.1   09/03/2021 2.9   08/02/2021 5.1   04/30/2020 3.29 (H)   09/18/2014 1.94 (H)   08/14/2014 2.46 (H)   08/13/2014 1.36 (H)       For Pharmacy Admin Tracking Only     Intervention Detail: Dose Adjustment: 1, reason: Therapy Optimization   Total # of Interventions Recommended: 1   Total # of Interventions Accepted: 1   Time Spent (min): 15

## 2022-02-14 ENCOUNTER — TELEPHONE (OUTPATIENT)
Dept: PHARMACY | Age: 52
End: 2022-02-14

## 2022-02-14 NOTE — TELEPHONE ENCOUNTER
Pt ESTELLEM asking to cancel his [facility] appt today, ( his appt was on 2/7 ) states he'll call back to r/s.

## 2022-02-18 ENCOUNTER — TELEPHONE (OUTPATIENT)
Dept: CARDIOLOGY CLINIC | Age: 52
End: 2022-02-18

## 2022-02-18 NOTE — TELEPHONE ENCOUNTER
Pt called back to let Juan Ware know his home Covid  test was neg and his BP is 143-80 pulse is 85-87

## 2022-02-18 NOTE — TELEPHONE ENCOUNTER
Called patient, has a nagging headache, with dizziness if he turns his head to fast or stands to quickly. Has a week history of left forearm pain( exacerbated by turning his arm a certain way) not exacerbated by walking up steps,walking fast, or taking the garbage out. INR have been therapeutic or above. No recent med changes. No recent weight gain. More short of breath than usual on Sunday when dancing around for [employer]. No known fever or chill. He is fully vaccinated has not been around anyone. No chest pain or palpitations. .  Blood pressure yesterday was 142/90, 84/85  Will do a home covid test within next two hours  and call me back.     Will be seeing pcp on Monday

## 2022-02-18 NOTE — TELEPHONE ENCOUNTER
Discussed with DKW, no need for appt today, will follow up with pcp on MOnday, If no better we will see next week Ear Wedge Repair Text: A wedge excision was completed by carrying down an excision through the full thickness of the ear and cartilage with an inward facing Burow's triangle. The wound was then closed in a layered fashion.

## 2022-02-21 ENCOUNTER — ANTI-COAG VISIT (OUTPATIENT)
Dept: PHARMACY | Age: 52
End: 2022-02-21
Payer: COMMERCIAL

## 2022-02-21 DIAGNOSIS — Z95.2 S/P AVR (AORTIC VALVE REPLACEMENT): Primary | ICD-10-CM

## 2022-02-21 DIAGNOSIS — Z79.01 LONG TERM (CURRENT) USE OF ANTICOAGULANTS: ICD-10-CM

## 2022-02-21 LAB — INTERNATIONAL NORMALIZATION RATIO, POC: 2.9

## 2022-02-21 PROCEDURE — 99211 OFF/OP EST MAY X REQ PHY/QHP: CPT

## 2022-02-21 PROCEDURE — 85610 PROTHROMBIN TIME: CPT

## 2022-02-21 NOTE — PROGRESS NOTES
Mr. Sowmya Wilcox is a 45 y.o. y/o male with history of Heart Valve Replacement who presents today for anticoagulation monitoring and adjustment. Pertinent PMH: HTN, PVD,   Pt allergic to sun so will be on prednisone in the summer   Pt has been on warfarin since ~2015  Hx pt of [facility] clinic. Patient Reported Findings:  Yes     No  [x]   []       Patient verifies current dosing regimen as listed pt normal weekly dose has been 10 mg on Mon and Fri and 5 mg all other days of the week ---> confirms dose   []   [x]       S/S bleeding/bruising/swelling/SOB-   []   [x]       Blood in urine or stool  []   [x]       Procedures scheduled in the future at this time  [x]   []       Missed Dose - Denies   []   [x]       Extra Dose - Denies   [x]   []       Change in medications Takes prednisone tapers through summer months when has flare up of reaction (40 mg taper x 16 days) --> 6 day prednisone taper started 9/29---> inc lisinopril --> medrol dose victorino 1/4 and then had another one. and colchicine.  Took ibuprofen, stopped last week -->  Might be starting allopurinol  [x]   []       Change in health/diet/appetite states that he will eat vit k occasionally every few weeks ---> no changes, no NVD --> eating greens every day (green beans, lettuce, asparagus, broccoli) states that he has been doing this for long time --> no changes, no NVD  [x]   []       Change in alcohol use stopped drinking alcohol for a few weeks --> no changes --> no alcohol for a few weeks d/t gout --> had one beer beginning of year and caused another gout attack     []   [x]       Change in activity  []   [x]       Hospital admission  []   [x]       Emergency department visit  []   [x]       Other complaints      Clinical Outcomes:  Yes     No  []   [x]       Major bleeding event  []   [x]       Thromboembolic event  Uses 5 mg tablets   Duration of warfarin Therapy: indefinite  INR Range:  2.5-3.5      Was due back 12/13, continues to be noncompliant     INR is 2.9 today   Patient continues to be non-compliant with appointments. May be started on allopurinol today. Will call clinic if so. Continue to take 10 mg on Mon and Fri and 5 mg all other days of the week. Encouraged to maintain a consistency of vegetables/salads.    Return to clinic in 4 weeks, 3/21    **consent form signed 11/15/2021    Referring Dr. Cari Koo   INR (no units)   Date Value   02/21/2022 2.9   01/21/2022 4.2   11/15/2021 3.4   09/30/2021 2.1   04/30/2020 3.29 (H)   09/18/2014 1.94 (H)   08/14/2014 2.46 (H)   08/13/2014 1.36 (H)   For Pharmacy Admin Tracking Only     Total # of Interventions Recommended: 0   Total # of Interventions Accepted: 0   Time Spent (min): 15

## 2022-03-17 ENCOUNTER — TELEPHONE (OUTPATIENT)
Dept: CARDIOLOGY CLINIC | Age: 52
End: 2022-03-17

## 2022-03-17 DIAGNOSIS — Z95.2 S/P AORTIC VALVE REPLACEMENT: ICD-10-CM

## 2022-03-17 DIAGNOSIS — I10 ESSENTIAL HYPERTENSION: ICD-10-CM

## 2022-03-17 RX ORDER — LISINOPRIL 10 MG/1
10 TABLET ORAL 2 TIMES DAILY
Qty: 180 TABLET | Refills: 1 | Status: SHIPPED | OUTPATIENT
Start: 2022-03-17 | End: 2022-08-24

## 2022-03-17 RX ORDER — WARFARIN SODIUM 5 MG/1
TABLET ORAL
Qty: 108 TABLET | Refills: 3 | Status: SHIPPED | OUTPATIENT
Start: 2022-03-17 | End: 2022-08-23

## 2022-03-17 NOTE — TELEPHONE ENCOUNTER
Pt states dkw increased lisinopril dose bid and the new script that was filled for once day. Asking for updated script to be called into [id number]- [place], [place] - [address] [phone] [address] [phone] [address], [facility]   Phone:  [phone]  Fax:  [phone]

## 2022-03-21 ENCOUNTER — ANTI-COAG VISIT (OUTPATIENT)
Dept: PHARMACY | Age: 52
End: 2022-03-21
Payer: COMMERCIAL

## 2022-03-21 DIAGNOSIS — Z79.01 LONG TERM (CURRENT) USE OF ANTICOAGULANTS: ICD-10-CM

## 2022-03-21 DIAGNOSIS — Z95.2 S/P AVR (AORTIC VALVE REPLACEMENT): Primary | ICD-10-CM

## 2022-03-21 LAB — INTERNATIONAL NORMALIZATION RATIO, POC: 2.3

## 2022-03-21 PROCEDURE — 99211 OFF/OP EST MAY X REQ PHY/QHP: CPT

## 2022-03-21 PROCEDURE — 85610 PROTHROMBIN TIME: CPT

## 2022-03-21 NOTE — PROGRESS NOTES
Mr. Skip Liu is a 45 y.o. y/o male with history of Heart Valve Replacement who presents today for anticoagulation monitoring and adjustment. Pertinent PMH: HTN, PVD,   Pt allergic to sun so will be on prednisone in the summer   Pt has been on warfarin since ~2015  Hx pt of [facility] clinic. Patient Reported Findings:  Yes     No  [x]   []       Patient verifies current dosing regimen as listed pt normal weekly dose has been 10 mg on Mon and Fri and 5 mg all other days of the week ---> confirms dose   []   [x]       S/S bleeding/bruising/swelling/SOB-   []   [x]       Blood in urine or stool  []   [x]       Procedures scheduled in the future at this time  [x]   []       Missed Dose - Denies   []   [x]       Extra Dose - Denies   [x]   []       Change in medications Takes prednisone tapers through summer months when has flare up of reaction (40 mg taper x 16 days) --> 6 day prednisone taper started 9/29---> inc lisinopril --> medrol dose victorino 1/4 and then had another one. and colchicine.  Took ibuprofen, stopped last week -->  Might be starting allopurinol---> started Allopurinol 3 weeks ago, ran out of lisinopril   [x]   []       Change in health/diet/appetite states that he will eat vit k occasionally every few weeks ---> no changes, no NVD --> eating greens every day (green beans, lettuce, asparagus, broccoli) states that he has been doing this for long time --> no changes, no NVD---> maybe more greens, no NVD  [x]   []       Change in alcohol use stopped drinking alcohol for a few weeks --> no changes --> no alcohol for a few weeks d/t gout --> had one beer beginning of year and caused another gout attack     []   [x]       Change in activity  []   [x]       Hospital admission  []   [x]       Emergency department visit  []   [x]       Other complaints      Clinical Outcomes:  Yes     No  []   [x]       Major bleeding event  []   [x]       Thromboembolic event  Uses 5 mg tablets Duration of warfarin Therapy: indefinite  INR Range:  2.5-3.5      Continues to be noncompliant. INR is 2.3 today despite starting Allopurinol. Will continue (today is boosted dose) and check in 2 weeks, if still low then will need to adjust.   Continue to take 10 mg on Mon and Fri and 5 mg all other days of the week. Encouraged to maintain a consistency of vegetables/salads.    Return to clinic in 2 weeks, 4/4    **consent form signed 11/15/2021    Referring Dr. Brigid Guevara   INR (no units)   Date Value   03/21/2022 2.3   02/21/2022 2.9   01/21/2022 4.2   11/15/2021 3.4   04/30/2020 3.29 (H)   09/18/2014 1.94 (H)   08/14/2014 2.46 (H)   08/13/2014 1.36 (H)   For Pharmacy Admin Tracking Only     Total # of Interventions Recommended: 0   Total # of Interventions Accepted: 0   Time Spent (min): 15

## 2022-04-04 ENCOUNTER — ANTI-COAG VISIT (OUTPATIENT)
Dept: PHARMACY | Age: 52
End: 2022-04-04
Payer: COMMERCIAL

## 2022-04-04 DIAGNOSIS — Z95.2 S/P AVR (AORTIC VALVE REPLACEMENT): Primary | ICD-10-CM

## 2022-04-04 DIAGNOSIS — Z79.01 LONG TERM (CURRENT) USE OF ANTICOAGULANTS: ICD-10-CM

## 2022-04-04 LAB — INTERNATIONAL NORMALIZATION RATIO, POC: 3.4

## 2022-04-04 PROCEDURE — 85610 PROTHROMBIN TIME: CPT

## 2022-04-04 PROCEDURE — 99211 OFF/OP EST MAY X REQ PHY/QHP: CPT

## 2022-04-04 NOTE — PROGRESS NOTES
Mr. Americo Cardoso is a 45 y.o. y/o male with history of Heart Valve Replacement who presents today for anticoagulation monitoring and adjustment. Pertinent PMH: HTN, PVD,   Pt allergic to sun so will be on prednisone in the summer   Pt has been on warfarin since ~2015  Hx pt of [facility] clinic. Patient Reported Findings:  Yes     No  [x]   []       Patient verifies current dosing regimen as listed pt normal weekly dose has been 10 mg on Mon and Fri and 5 mg all other days of the week ---> confirms dose   []   [x]       S/S bleeding/bruising/swelling/SOB-   []   [x]       Blood in urine or stool  []   [x]       Procedures scheduled in the future at this time  []   [x]       Missed Dose - Denies   []   [x]       Extra Dose - Denies   [x]   []       Change in medications Takes prednisone tapers through summer months when has flare up of reaction (40 mg taper x 16 days) --> 6 day prednisone taper started 9/29---> inc lisinopril --> medrol dose victorino 1/4 and then had another one. and colchicine.  Took ibuprofen, stopped last week -->  Might be starting allopurinol---> started Allopurinol 3 weeks ago, ran out of lisinopril --> still taking allopurinol   []   [x]       Change in health/diet/appetite states that he will eat vit k occasionally every few weeks --> eating greens every day (green beans, lettuce, asparagus, broccoli) states that he has been doing this for long time --> no changes, no NVD---> maybe more greens, no NVD --> no changes   []   [x]       Change in alcohol use stopped drinking alcohol for a few weeks --> no changes --> no alcohol for a few weeks d/t gout --> had one beer beginning of year and caused another gout attack --> no changes     []   [x]       Change in activity  []   [x]       Hospital admission  []   [x]       Emergency department visit  []   [x]       Other complaints      Clinical Outcomes:  Yes     No  []   [x]       Major bleeding event  []   [x] Thromboembolic event  Uses 5 mg tablets   Duration of warfarin Therapy: indefinite  INR Range:  2.5-3.5      INR is 3.4 today   Continue to take 10 mg on Mon and Fri and 5 mg all other days of the week. Encouraged to maintain a consistency of vegetables/salads.    Return to clinic in 4 weeks, 5/2    **consent form signed 11/15/2021    Referring Dr. Meenu Desai   INR (no units)   Date Value   03/21/2022 2.3   02/21/2022 2.9   01/21/2022 4.2   11/15/2021 3.4   04/30/2020 3.29 (H)   09/18/2014 1.94 (H)   08/14/2014 2.46 (H)   08/13/2014 1.36 (H)       For Pharmacy Admin Tracking Only     Total # of Interventions Recommended: 0   Total # of Interventions Accepted: 0   Time Spent (min): 15

## 2022-04-18 RX ORDER — ASPIRIN 81 MG/1
TABLET ORAL
Qty: 90 TABLET | Refills: 3 | Status: SHIPPED | OUTPATIENT
Start: 2022-04-18

## 2022-05-03 ENCOUNTER — TELEPHONE (OUTPATIENT)
Dept: PHARMACY | Age: 52
End: 2022-05-03

## 2022-05-09 ENCOUNTER — ANTI-COAG VISIT (OUTPATIENT)
Dept: PHARMACY | Age: 52
End: 2022-05-09
Payer: COMMERCIAL

## 2022-05-09 DIAGNOSIS — Z79.01 LONG TERM (CURRENT) USE OF ANTICOAGULANTS: ICD-10-CM

## 2022-05-09 DIAGNOSIS — Z95.2 S/P AVR (AORTIC VALVE REPLACEMENT): Primary | ICD-10-CM

## 2022-05-09 LAB — INTERNATIONAL NORMALIZATION RATIO, POC: 3.4

## 2022-05-09 PROCEDURE — 99211 OFF/OP EST MAY X REQ PHY/QHP: CPT

## 2022-05-09 PROCEDURE — 85610 PROTHROMBIN TIME: CPT

## 2022-05-09 NOTE — PROGRESS NOTES
Mr. Cady Castro is a 45 y.o. y/o male with history of Heart Valve Replacement who presents today for anticoagulation monitoring and adjustment. Pertinent PMH: HTN, PVD,   Pt allergic to sun so will be on prednisone in the summer   Pt has been on warfarin since ~2015  Hx pt of [facility] clinic. Patient Reported Findings:  Yes     No  [x]   []       Patient verifies current dosing regimen as listed pt normal weekly dose has been 10 mg on Mon and Fri and 5 mg all other days of the week ---> confirms dose   []   [x]       S/S bleeding/bruising/swelling/SOB- denies   []   [x]       Blood in urine or stool  []   [x]       Procedures scheduled in the future at this time  []   [x]       Missed Dose - Denies   []   [x]       Extra Dose - Denies   [x]   []       Change in medications Takes prednisone tapers through summer months when has flare up of reaction (40 mg taper x 16 days) --> 6 day prednisone taper started 9/29---> inc lisinopril --> medrol dose victorino 1/4 and then had another one. and colchicine.  Took ibuprofen, stopped last week -->  Might be starting allopurinol---> started Allopurinol 3 weeks ago, ran out of lisinopril --> still taking allopurinol---> no changes but will have a pred taper end of May before vacation     []   [x]       Change in health/diet/appetite states that he will eat vit k occasionally every few weeks --> eating greens every day (green beans, lettuce, asparagus, broccoli) states that he has been doing this for long time --> no changes, no NVD---> maybe more greens, no NVD --> no changes   []   [x]       Change in alcohol use stopped drinking alcohol for a few weeks --> no changes --> no alcohol for a few weeks d/t gout --> had one beer beginning of year and caused another gout attack --> no changes     []   [x]       Change in activity  []   [x]       Hospital admission  []   [x]       Emergency department visit  []   [x]       Other complaints      Clinical Outcomes:  Yes     No  []   [x]       Major bleeding event  []   [x]       Thromboembolic event  Uses 5 mg tablets   Duration of warfarin Therapy: indefinite  INR Range:  2.5-3.5      INR is 3.4 again today   Continue to take 10 mg on Mon and Fri and 5 mg all other days of the week. When you start the prednisone take 5mg daily (call me about dosage). Encouraged to maintain a consistency of vegetables/salads.    Return to clinic in 4 weeks, 6/10  [place] 5/30-6/8 (allergic to sun so takes pred a few days before/while on vacation- he will call with exact dose/schedule)    **consent form signed 11/15/2021    Referring Dr. Brook Orona   INR (no units)   Date Value   04/04/2022 3.4   03/21/2022 2.3   02/21/2022 2.9   01/21/2022 4.2   04/30/2020 3.29 (H)   09/18/2014 1.94 (H)   08/14/2014 2.46 (H)   08/13/2014 1.36 (H)     For Pharmacy Admin Tracking Only     Intervention Detail: Dose Adjustment: 1, reason: Interaction   Total # of Interventions Recommended: 1   Total # of Interventions Accepted: 1   Time Spent (min): 15

## 2022-06-14 ENCOUNTER — TELEPHONE (OUTPATIENT)
Dept: PHARMACY | Age: 52
End: 2022-06-14

## 2022-06-15 NOTE — TELEPHONE ENCOUNTER
L/m for pt to call and reschedule on his wife's phone. It is the only on I could reach that had a vm on it to leave a message.

## 2022-06-22 ENCOUNTER — OFFICE VISIT (OUTPATIENT)
Dept: SURGERY | Age: 52
End: 2022-06-22
Payer: COMMERCIAL

## 2022-06-22 VITALS — DIASTOLIC BLOOD PRESSURE: 82 MMHG | BODY MASS INDEX: 37.1 KG/M2 | WEIGHT: 266 LBS | SYSTOLIC BLOOD PRESSURE: 120 MMHG

## 2022-06-22 DIAGNOSIS — K43.9 VENTRAL HERNIA WITHOUT OBSTRUCTION OR GANGRENE: Primary | ICD-10-CM

## 2022-06-22 PROCEDURE — 3017F COLORECTAL CA SCREEN DOC REV: CPT | Performed by: SURGERY

## 2022-06-22 PROCEDURE — 1036F TOBACCO NON-USER: CPT | Performed by: SURGERY

## 2022-06-22 PROCEDURE — G8427 DOCREV CUR MEDS BY ELIG CLIN: HCPCS | Performed by: SURGERY

## 2022-06-22 PROCEDURE — G8417 CALC BMI ABV UP PARAM F/U: HCPCS | Performed by: SURGERY

## 2022-06-22 PROCEDURE — 99203 OFFICE O/P NEW LOW 30 MIN: CPT | Performed by: SURGERY

## 2022-06-22 ASSESSMENT — ENCOUNTER SYMPTOMS
EYES NEGATIVE: 1
CONSTIPATION: 1
RESPIRATORY NEGATIVE: 1
ALLERGIC/IMMUNOLOGIC NEGATIVE: 1
ABDOMINAL DISTENTION: 1
ABDOMINAL PAIN: 1
DIARRHEA: 1

## 2022-06-22 NOTE — PROGRESS NOTES
Torrey Anguiano III is a 45 y.o. male     CC: Supraumbilical ventral hernia    HPI: Pleasant 59-year-old male was actually scheduled for repair of this hernia in July 2014. The hernia was delayed because of cardiac concerns. He eventually underwent a mechanical aortic valve replacement. The hernia has increased in size over time. It does cause symptoms of discomfort with physical exertion. No history of nausea, vomiting, anorexia, unexpected weight loss, fevers, chills, change in bowel habits or urinary symptoms. Past Medical History:   Diagnosis Date    Aortic stenosis     mild as per pt 7-23-14    Colon polyp 6-2014    GERD (gastroesophageal reflux disease)     Headache(784.0)     Hernia 2014    Neuropathy     hand and feet       Patient has no known allergies. Past Surgical History:   Procedure Laterality Date    AORTIC VALVE REPLACEMENT  8/8/2014    Dr. Maura Maher - 22-mm ATS  mechanical valve    APPENDECTOMY      COLONOSCOPY          Prior to Visit Medications    Medication Sig Taking?  Authorizing Provider   ALLOPURINOL PO Take by mouth Yes Historical Provider, MD   aspirin 81 MG EC tablet TAKE 1 TABLET BY MOUTH EVERY DAY Yes Sindi Dior DO   lisinopril (PRINIVIL;ZESTRIL) 10 MG tablet Take 1 tablet by mouth in the morning and at bedtime Yes Sindi Dior DO   metoprolol tartrate (LOPRESSOR) 25 MG tablet TAKE 1 TABLET BY MOUTH TWICE A DAY Yes Sindi Dior DO   atorvastatin (LIPITOR) 40 MG tablet Take 1 tablet by mouth daily Yes Sindi Dior DO   amoxicillin (AMOXIL) 500 MG capsule Take 1 capsule by mouth See Admin Instructions for 4 doses Take 2 gm one hour prior any dental procedure Yes Sindi Dior DO   predniSONE (DELTASONE) 10 MG tablet Take 10-40 mg by mouth daily Seasonal (Summer only) Yes Historical Provider, MD   Cholecalciferol (VITAMIN D-3 PO) Take by mouth once a week  Yes Historical Provider, MD   warfarin (COUMADIN) 5 MG tablet TAKE 2 TABLETS BY MOUTH TWICE A WEEK AND 1 TABLET FIVE TIMES WEEKLY. (TAKE 2 TABLETS ON MON AND FRI AND 1 TABLET ALL OTHER DAYS OF THE WEEK.)  Hilario Eid DO   Apoaequorin (PREVAGEN EXTRA STRENGTH) 20 MG CAPS Take 20 mg by mouth daily   Patient not taking: Reported on 2022  Historical Provider, MD       Social History     Socioeconomic History    Marital status:      Spouse name: Not on file    Number of children: Not on file    Years of education: Not on file    Highest education level: Not on file   Occupational History    Not on file   Tobacco Use    Smoking status: Former Smoker     Packs/day: 0.50     Years: 20.00     Pack years: 10.00     Types: Cigarettes     Quit date: 2017     Years since quittin.8    Smokeless tobacco: Never Used   Vaping Use    Vaping Use: Never used   Substance and Sexual Activity    Alcohol use: Yes     Alcohol/week: 3.0 standard drinks     Types: 3 Cans of beer per week     Comment: 2-4 drinks per day     Drug use: No    Sexual activity: Yes     Partners: Female   Other Topics Concern    Not on file   Social History Narrative    Not on file     Social Determinants of Health     Financial Resource Strain:     Difficulty of Paying Living Expenses: Not on file   Food Insecurity:     Worried About Running Out of Food in the Last Year: Not on file    Jagjit of Food in the Last Year: Not on file   Transportation Needs:     Lack of Transportation (Medical): Not on file    Lack of Transportation (Non-Medical):  Not on file   Physical Activity:     Days of Exercise per Week: Not on file    Minutes of Exercise per Session: Not on file   Stress:     Feeling of Stress : Not on file   Social Connections:     Frequency of Communication with Friends and Family: Not on file    Frequency of Social Gatherings with Friends and Family: Not on file    Attends Jainism Services: Not on file    Active Member of Clubs or Organizations: Not on file    Attends Club or Organization Meetings: Not on file    Marital Status: Not on file   Intimate Partner Violence:     Fear of Current or Ex-Partner: Not on file    Emotionally Abused: Not on file    Physically Abused: Not on file    Sexually Abused: Not on file   Housing Stability:     Unable to Pay for Housing in the Last Year: Not on file    Number of [place] in the Last Year: Not on file    Unstable Housing in the Last Year: Not on file       Review of Systems   Constitutional: Negative. HENT: Negative. Eyes: Negative. Respiratory: Negative. Cardiovascular: Negative. Gastrointestinal: Positive for abdominal distention, abdominal pain, constipation and diarrhea. Endocrine: Negative. Genitourinary: Negative. Musculoskeletal: Negative. Skin: Negative. Allergic/Immunologic: Negative. Neurological: Negative. Hematological: Bruises/bleeds easily. Psychiatric/Behavioral: Negative.        :   Physical Exam  Constitutional:       Appearance: He is well-developed. HENT:      Head: Normocephalic and atraumatic. Right Ear: External ear normal.      Left Ear: External ear normal.   Eyes:      Conjunctiva/sclera: Conjunctivae normal.   Cardiovascular:      Rate and Rhythm: Normal rate and regular rhythm. Pulmonary:      Effort: Pulmonary effort is normal.      Breath sounds: Normal breath sounds. Abdominal:      General: There is no distension. Palpations: Abdomen is soft. Comments: Chronically incarcerated, fat-containing hernia above the umbilicus   Musculoskeletal:         General: Normal range of motion. Cervical back: Normal range of motion and neck supple. Skin:     General: Skin is warm and dry. Neurological:      Mental Status: He is alert and oriented to person, place, and time. Psychiatric:         Behavior: Behavior normal.       Wt 266 lb (120.7 kg)   BMI 37.10 kg/m²     :      68-year-old male who presents for evaluation of a bulge above the umbilicus.   He reports that it causes discomfort with physical exertion. Physical examination reveals a chronically incarcerated, fat-containing supraumbilical ventral hernia. Plan:      Repair of incarcerated ventral hernia with mesh. The patient's anticoagulation will need to be reversed preoperatively. He will likely need to be on a Lovenox bridge because of the mechanical aortic valve. We will ask his cardiologist for recommendations regarding anticoagulation. Patient explained risks, benefits and possible complications including risk of bleeding, bowel injury, hernia recurrence, infection requiring mesh removal, erosion of mesh into bowel or nerve entrapment.

## 2022-06-22 NOTE — LETTER
[facility]  [address] [address]  Phone: [phone]  Fax: [phone]    June 22, 2022    Patient: Jimena Tan  MRN:  [id number]  YOB: 1970  Date of Visit: 6/22/2022    Dear Dr Cande Fletcher:    Thank you for the request for consultation for [address] [place] [address]. Below are the relevant portions of my assessment and plan of care. Assessment:  47-year-old male who presents for evaluation of a bulge above the umbilicus. He reports that it causes discomfort with physical exertion. Physical examination reveals a chronically incarcerated, fat-containing supraumbilical ventral hernia. Plan:  Repair of incarcerated ventral hernia with mesh. The patient's anticoagulation will need to be reversed preoperatively. He will likely need to be on a Lovenox bridge because of the mechanical aortic valve. We will ask his cardiologist for recommendations regarding anticoagulation. If you have questions, please do not hesitate to call me. I look forward to following Charles Rodriguez along with you.     Sincerely,    Jaspreet Han MD    CC providers:    Rich Chopra MD  [address]  [address]  Via Fax: [phone]

## 2022-06-23 ENCOUNTER — TELEPHONE (OUTPATIENT)
Dept: SURGERY | Age: 52
End: 2022-06-23

## 2022-06-24 ENCOUNTER — TELEPHONE (OUTPATIENT)
Dept: CARDIOLOGY CLINIC | Age: 52
End: 2022-06-24

## 2022-06-24 NOTE — TELEPHONE ENCOUNTER
Called and spoke with patient and offered him an open slot for June 27th. Patient refused due to he is actually needing to have a later time that was not available. He would prefer to stay with Dr Mario Francisco for visit's since Dr Peter Colón is no longer here and it was his recommendation. He will call the surgeons office to reschedule his surgery date. Offered appt with different provider he refused. He request a date for august since he is just getting back from vacation.

## 2022-06-24 NOTE — TELEPHONE ENCOUNTER
CARDIAC CLEARANCE     What type of procedure are you having?  ventrial hernia repair  Which physician is performing your procedure? Dr Amanda Jefferson    When is your procedure scheduled for?  7/12/22  Where are you having this procedure? [facility]   Are you taking Blood Thinners? Yes If so what? (Name/dose/frequesncy) Renetta Macdonald      Does the surgeon want you to stop your blood thinner? If so for how long?  5 day prior    Phone Number and Contact Name for Physicians office: [phone]    Fax number to send information: [phone]

## 2022-06-27 ENCOUNTER — ANTI-COAG VISIT (OUTPATIENT)
Dept: PHARMACY | Age: 52
End: 2022-06-27
Payer: COMMERCIAL

## 2022-06-27 DIAGNOSIS — Z95.2 S/P AVR (AORTIC VALVE REPLACEMENT): Primary | ICD-10-CM

## 2022-06-27 DIAGNOSIS — Z79.01 LONG TERM (CURRENT) USE OF ANTICOAGULANTS: ICD-10-CM

## 2022-06-27 LAB — INTERNATIONAL NORMALIZATION RATIO, POC: 2.9

## 2022-06-27 PROCEDURE — 99211 OFF/OP EST MAY X REQ PHY/QHP: CPT

## 2022-06-27 PROCEDURE — 85610 PROTHROMBIN TIME: CPT

## 2022-06-27 NOTE — PROGRESS NOTES
Mr. Madonna Tipton is a 45 y.o. y/o male with history of Heart Valve Replacement who presents today for anticoagulation monitoring and adjustment. Pertinent PMH: HTN, PVD,   Pt allergic to sun so will be on prednisone in the summer   Pt has been on warfarin since ~2015  Hx pt of [facility] clinic. Patient Reported Findings:  Yes     No  [x]   []       Patient verifies current dosing regimen as listed pt normal weekly dose has been 10 mg on Mon and Fri and 5 mg all other days of the week ---> confirms dose   []   [x]       S/S bleeding/bruising/swelling/SOB- denies   []   [x]       Blood in urine or stool  [x]   []       Procedures scheduled in the future at this time needs to have hernia surgery, but was not cleared by cardiology so r/s   [x]   []       Missed Dose - Saturday d/t taking prednisone    []   [x]       Extra Dose - Denies   [x]   []       Change in medications Takes prednisone tapers through summer months when has flare up of reaction (40 mg taper x 16 days) --> 6 day prednisone taper started 9/29---> inc lisinopril --> medrol dose victorino 1/4 and then had another one. and colchicine.  Took ibuprofen, stopped last week -->  Might be starting allopurinol---> started Allopurinol 3 weeks ago, ran out of lisinopril --> still taking allopurinol---> no changes but will have a pred taper end of May before vacation --> resumed prednisone so will be on for 2 weeks     []   [x]       Change in health/diet/appetite states that he will eat vit k occasionally every few weeks --> eating greens every day (green beans, lettuce, asparagus, broccoli) states that he has been doing this for long time --> maybe more greens, no NVD --> no changes   [x]   []       Change in alcohol use stopped drinking alcohol for a few weeks --> no changes --> no alcohol for a few weeks d/t gout --> had one beer beginning of year and caused another gout attack --> had more alcohol while was on vacation   []   [x] Change in activity  []   [x]       Hospital admission  []   [x]       Emergency department visit  []   [x]       Other complaints      Clinical Outcomes:  Yes     No  []   [x]       Major bleeding event  []   [x]       Thromboembolic event  Uses 5 mg tablets   Duration of warfarin Therapy: indefinite  INR Range:  2.5-3.5      INR is 2.9 today   Since will be on prednisone for the next 10 days, will adjust dose of warfarin   Take 5 mg tonight and Fri then continue to take 10 mg on Mon and Fri and 5 mg all other days of the week. Encouraged to maintain a consistency of vegetables/salads.    Asked pt to call clinic once hernia surgery scheduled and cleared by cardiology in order to dose warfarin   Return to clinic in 3 weeks, 7/18    **consent form signed 11/15/2021    Referring Dr. Kala Freeman   INR (no units)   Date Value   04/30/2020 3.29 (H)   09/18/2014 1.94 (H)   08/14/2014 2.46 (H)   08/13/2014 1.36 (H)     INR,(POC) (no units)   Date Value   05/09/2022 3.4   04/04/2022 3.4   03/21/2022 2.3   02/21/2022 2.9     For Pharmacy Admin Tracking Only     Intervention Detail: Dose Adjustment: 1, reason: Interaction   Total # of Interventions Recommended: 1   Total # of Interventions Accepted: 1   Time Spent (min): 15

## 2022-07-25 ENCOUNTER — TELEPHONE (OUTPATIENT)
Dept: PHARMACY | Age: 52
End: 2022-07-25

## 2022-07-25 NOTE — TELEPHONE ENCOUNTER
Unable to reach patient to reschedule. Voice mail box is not set up and I could not leave a message.

## 2022-07-27 ENCOUNTER — CLINICAL DOCUMENTATION (OUTPATIENT)
Dept: CARDIOVASCULAR ICU | Age: 52
End: 2022-07-27

## 2022-07-27 NOTE — PROGRESS NOTES
Called patient about missed appointment. He states that he was out of town and will reschedule. States that INR visits are expensive.

## 2022-08-01 NOTE — TELEPHONE ENCOUNTER
Tried calling pt to schedule appt, unable to LVM. Per chart pt contacted Dr Dora Chávez on 7/27 , states he's been out of town.

## 2022-08-15 ENCOUNTER — ANTI-COAG VISIT (OUTPATIENT)
Dept: PHARMACY | Age: 52
End: 2022-08-15
Payer: COMMERCIAL

## 2022-08-15 DIAGNOSIS — Z79.01 LONG TERM (CURRENT) USE OF ANTICOAGULANTS: ICD-10-CM

## 2022-08-15 DIAGNOSIS — Z95.2 S/P AVR (AORTIC VALVE REPLACEMENT): Primary | ICD-10-CM

## 2022-08-15 LAB — INTERNATIONAL NORMALIZATION RATIO, POC: 3.7

## 2022-08-15 PROCEDURE — 85610 PROTHROMBIN TIME: CPT

## 2022-08-15 PROCEDURE — 99212 OFFICE O/P EST SF 10 MIN: CPT

## 2022-08-15 ASSESSMENT — ENCOUNTER SYMPTOMS
ABDOMINAL PAIN: 0
CHEST TIGHTNESS: 0
ABDOMINAL DISTENTION: 0
COUGH: 0
NAUSEA: 0
SHORTNESS OF BREATH: 0
BLOOD IN STOOL: 0
PHOTOPHOBIA: 0

## 2022-08-15 NOTE — PROGRESS NOTES
Mr. Ynes Earl is a 45 y.o. y/o male with history of Heart Valve Replacement who presents today for anticoagulation monitoring and adjustment. Pertinent PMH: HTN, PVD,   Pt allergic to sun so will be on prednisone in the summer   Pt has been on warfarin since ~2015  Hx pt of [facility] clinic. Patient Reported Findings:  Yes     No  [x]   []       Patient verifies current dosing regimen as listed pt normal weekly dose has been 10 mg on Mon and Fri and 5 mg all other days of the week ---> confirms dose   []   [x]       S/S bleeding/bruising/swelling/SOB- denies   []   [x]       Blood in urine or stool  [x]   []       Procedures scheduled in the future at this time needs to have hernia surgery, but was not cleared by cardiology so r/s   [x]   []       Missed Dose - denies   []   [x]       Extra Dose - Denies   [x]   []       Change in medications Takes prednisone tapers through summer months when has flare up of reaction (40 mg taper x 16 days) --> 6 day prednisone taper started 9/29---> inc lisinopril --> medrol dose victorino 1/4 and then had another one. and colchicine.  Took ibuprofen, stopped last week -->  Might be starting allopurinol---> started Allopurinol 3 weeks ago, ran out of lisinopril --> still taking allopurinol---> no changes but will have a pred taper end of May before vacation --> resumed prednisone so will be on for 2 weeks---> done with prednisone for now (thinks will have another round but will call us when that is), had a few OTC ASA for HA recently      []   [x]       Change in health/diet/appetite states that he will eat vit k occasionally every few weeks --> eating greens every day (green beans, lettuce, asparagus, broccoli) states that he has been doing this for long time --> maybe more greens, no NVD --> no changes---> less greens, no NVD   [x]   []       Change in alcohol use stopped drinking alcohol for a few weeks --> no changes --> no alcohol for a few weeks d/t gout --> had one beer beginning of year and caused another gout attack --> had more alcohol while was on vacation-->normal amounts    []   [x]       Change in activity  []   [x]       Hospital admission  []   [x]       Emergency department visit  []   [x]       Other complaints      Clinical Outcomes:  Yes     No  []   [x]       Major bleeding event  []   [x]       Thromboembolic event  Uses 5 mg tablets   Duration of warfarin Therapy: indefinite  INR Range:  2.5-3.5      Was due back 7/18. INR is 3.7 today dt no greens and extra ASA recently for HA. Take 5mg tonight then continue to take 10 mg on Mon and Fri and 5 mg all other days of the week. Encouraged to maintain a consistency of vegetables/salads. Asked pt to call clinic once hernia surgery scheduled and cleared by cardiology in order to dose warfarin---> nothing yet, will call (apt 8/23 for clearance). Return to clinic in 4 weeks, 9/12 per request. Will likely need to RS around procedure/ pred.     **consent form signed 11/15/2021    Referring Dr. Nelson Brothers   INR (no units)   Date Value   04/30/2020 3.29 (H)   09/18/2014 1.94 (H)   08/14/2014 2.46 (H)   08/13/2014 1.36 (H)     INR,(POC) (no units)   Date Value   08/15/2022 3.7   06/27/2022 2.9   05/09/2022 3.4   04/04/2022 3.4     For Pharmacy Admin Tracking Only    Intervention Detail: Dose Adjustment: 1, reason: Therapy Optimization  Total # of Interventions Recommended: 1  Total # of Interventions Accepted: 1  Time Spent (min): 15

## 2022-08-15 NOTE — PROGRESS NOTES
[address]  8/23/22  Referring: Dr. Bradley Mrate CONSULT/CHIEF COMPLAINT/HPI     Reason for visit/ Chief complaint  One year follow up  AVR   HPI Grzegorz Hodge III is a 45 y.o. seen for cardiac clearance for a ventral hernia repair with mesh. He has a history fof AVR. He had normal coronaries in 2014. He is treated for hypertension, hyperlipidemia  His grandmother also had an AVR. His half sister had an aneurysm, but he doesn't know any more details. He reports he has been evaluated for lupus by his dermatologist, which was negative  He works 4 days a week, golfs and fishes in his spare time. He is under a lot of stress worrying about the upcoming surgery, which may limit his ability to take care of his house    His AS was a incidental finding prior an elective hernia repair. After the AVR he had much more energy. Today he complains of pain in his stomach along the the hernia area. He takes care of his large yard with gardens. He is able to walk 5-7 miles without stopping. No chest pain, shortness of breath palpitations or dizziness. He takes prednisone prn for sun allergy ( left elbow)   Patient is compliant with medications and is tolerating them well without side effects     HISTORY/ALLERGIES/ROS     MedHx:  has a past medical history of Aortic stenosis, Colon polyp, GERD (gastroesophageal reflux disease), Headache(784.0), Hernia, and Neuropathy. SurgHx:  has a past surgical history that includes Appendectomy; Colonoscopy; and Aortic valve replacement (8/8/2014). SocHx:  reports that he quit smoking about 5 years ago. His smoking use included cigarettes. He has a 10.00 pack-year smoking history. He has never used smokeless tobacco. He reports current alcohol use of about 3.0 standard drinks per week. He reports current drug use. Drug: Marijuana Luke Blow). FamHx: Sister with \"Aneurysm\"  Allergies: Patient has no known allergies.    ROS:   Review of Systems   Constitutional: Negative for activity change, diaphoresis, fatigue and fever. HENT:  Negative for congestion and ear discharge. Eyes:  Negative for photophobia and visual disturbance. Respiratory:  Negative for cough, chest tightness and shortness of breath. Cardiovascular:  Negative for chest pain and palpitations. Gastrointestinal:  Negative for abdominal distention, abdominal pain, blood in stool and nausea. Endocrine: Negative for cold intolerance and polydipsia. Genitourinary:  Negative for difficulty urinating and flank pain. Musculoskeletal:  Positive for arthralgias and myalgias. Skin:  Negative for rash and wound. Allergic/Immunologic: Negative for environmental allergies and immunocompromised state. Neurological:  Negative for dizziness and headaches. Hematological:  Negative for adenopathy. Does not bruise/bleed easily. Psychiatric/Behavioral:  Negative for confusion. The patient is not hyperactive. MEDICATIONS      Prior to Admission medications    Medication Sig Start Date End Date Taking? Authorizing Provider   ALLOPURINOL PO Take by mouth   Yes Historical Provider, MD   aspirin 81 MG EC tablet TAKE 1 TABLET BY MOUTH EVERY DAY 4/18/22  Yes Keyana Merchant DO   warfarin (COUMADIN) 5 MG tablet TAKE 2 TABLETS BY MOUTH TWICE A WEEK AND 1 TABLET FIVE TIMES WEEKLY. (TAKE 2 TABLETS ON MON AND FRI AND 1 TABLET ALL OTHER DAYS OF THE WEEK.) 3/17/22 8/23/22 Yes Keyana Merchant DO   lisinopril (PRINIVIL;ZESTRIL) 10 MG tablet Take 1 tablet by mouth in the morning and at bedtime 3/17/22  Yes Keyana Merchant DO   metoprolol tartrate (LOPRESSOR) 25 MG tablet TAKE 1 TABLET BY MOUTH TWICE A DAY 9/30/21  Reed Merchant DO   atorvastatin (LIPITOR) 40 MG tablet Take 1 tablet by mouth daily 9/30/21  Reed Merchant DO   amoxicillin (AMOXIL) 500 MG capsule Take 1 capsule by mouth See Admin Instructions for 4 doses Take 2 gm one hour prior any dental procedure 9/30/21  Yes Keyana Merchant DO   predniSONE (DELTASONE) 10 MG tablet Take 10-40 mg by mouth as needed Seasonal (Summer only) 5/22/19  Yes Historical Provider, MD   Cholecalciferol (VITAMIN D-3 PO) Take by mouth once a week    Yes Historical Provider, MD       PHYSICAL EXAM        Vitals:    08/23/22 0757   BP: 118/74   Pulse: 79   SpO2: 98%      Weight: 271 lb (122.9 kg)     Gen Alert, cooperative, no distress Heart  Regular rate and rhythm, click, 1/6 systolic murmur   Head Normocephalic, atraumatic, no abnormalities Abd  Soft, NT, +BS, no mass, no OM   Eyes PERRLA, conj/corn clear Ext  Ext nl, AT, no C/C, no edema   Nose Nares normal, no drain age, Non-tender Pulse 2+ and symmetric   Throat Lips, mucosa, tongue normal Skin Color/text/turg nl, +sun-induced rash   Neck S/S, TM, NT, no bruit Psych Nl mood and affect   Lung CTA-B, unlabored, no DTP     Ch wall NT, sternotomy        LABS and Imaging     Relevant and available CV data reviewed  Echo/MRI: 9/30/2021 Summary   -Normal left ventricle size and systolic function with an estimated ejection   fraction of 55-60%. -No regional wall motion abnormalities are seen. -There is moderate concentric left ventricular hypertrophy.  -Normal diastolic function. -Mild mitral regurgitation is present.  -A mechanical artificial aortic valve appears well seated with a maximum   velocity of 2.23m/s and a mean gradient of 11mmHg. -Right ventricular systolic function is mildly reduced . -Mild tricuspid regurgitation.   -The right atrium is dilated.   Cath: 2014  Normal cornaries  Holter  none  EKG: sinus rhythm,  Personally interpreted 8/23/22  Stress: none  moderate Risk  moderate Complexity/Medical Decision Making  Outside records Reviewed  Labs Reviewed  Prior Imaging, ekg, cath, echo reviewed when available  Medications reviewed  Old Notes reviewed  ASSESSMENT AND PLAN   Preop exam  -    Supraumbilical ventral hernia repair with mesh  -    Dr Truddie Crigler - request hold coumadin 5 days prior  -    discussed risk of coming off of coumadin including stroke and valvular dysfunction   Plan  - patient capable of 4 mets without active cardiac condition (no chf, no chest pain, valvular disease, or arrhythmia). No further evaluation needed per acc/aha 2014 perioperative guidelines  -    may proceed at low risk for moderate risk surgery, may come off of coumadin 5 days prior will require  lovenox bridging (will coordinate with [facility] coumadin clinic)    2. Abd pain  -    new problem  Plan  -    ventral hernia repair with mesh    3. Mechanical aortic valve for bicuspid aortic stenosis   -     bicuspid AV with replacement  -     on coumadin-- managed at [facility] coumadin clinic  -     Normal coronaries  -     stable  Plan  -     amoxicillin 2 gm one hour prior dental work. Discussed importance of regular dental work and seeking medical attention for fever  -     recommend family members be screened for bicuspid valve   -     still has not asked sister about her cardiac history  -     continue warfarin and aspirin     4. Essential Hypertension  -     118/74  -stable  Plan  -     continue lisinopril and lopressor  -     will work on weight loss      3. Mixed Hyperlipidemia  -    9/4/2020 ldl 112 tri 179  -    3/1/22  tc 199 tri 172 hdl 62 ldl 103  -stable  Plan  -    continue lipitor      4. Overweight  -    BMI 37.8  Plan  -    goal weight next visit 250    5. Family history of aneurysm with bicuspic aortic valve  -    half sister had \"heart anuerysm surgery\"  Plan  -    cta of aorta to assess ascending aorta      Patient counseled on lifestyle modification, diet, and exercise. Follow Up: One year    Dr. Tim Click:  Bebe RAMOS am scribing for and in the presence of Isaias Suh 08/23/22 8:27 AM   Physician Attestation  The scribe for and in the presence of RED Suero.   The scribe Markos Cobb may have prepopulated components of this note with my historical  intellectual property under my direct supervision. Any additions to this intellectual property were performed in my presence and at my direction.   Furthermore, the content and accuracy of this note have been reviewed by me RED Luna.  8/23/2022 8:27 AM

## 2022-08-23 ENCOUNTER — OFFICE VISIT (OUTPATIENT)
Dept: CARDIOLOGY CLINIC | Age: 52
End: 2022-08-23
Payer: COMMERCIAL

## 2022-08-23 VITALS
DIASTOLIC BLOOD PRESSURE: 74 MMHG | SYSTOLIC BLOOD PRESSURE: 118 MMHG | HEIGHT: 71 IN | WEIGHT: 271 LBS | HEART RATE: 79 BPM | BODY MASS INDEX: 37.94 KG/M2 | OXYGEN SATURATION: 98 %

## 2022-08-23 DIAGNOSIS — K43.9 VENTRAL HERNIA WITHOUT OBSTRUCTION OR GANGRENE: ICD-10-CM

## 2022-08-23 DIAGNOSIS — Z82.49 FAMILY HISTORY OF ANEURYSM: ICD-10-CM

## 2022-08-23 DIAGNOSIS — I10 ESSENTIAL HYPERTENSION: ICD-10-CM

## 2022-08-23 DIAGNOSIS — Z01.810 PREOP CARDIOVASCULAR EXAM: ICD-10-CM

## 2022-08-23 DIAGNOSIS — Q23.1 BICUSPID AORTIC VALVE: Primary | ICD-10-CM

## 2022-08-23 DIAGNOSIS — E78.2 MIXED HYPERLIPIDEMIA: ICD-10-CM

## 2022-08-23 PROBLEM — I77.810 DILATED AORTIC ROOT (HCC): Status: ACTIVE | Noted: 2022-08-23

## 2022-08-23 PROBLEM — Q23.81 BICUSPID AORTIC VALVE: Status: ACTIVE | Noted: 2022-08-23

## 2022-08-23 PROCEDURE — G8417 CALC BMI ABV UP PARAM F/U: HCPCS | Performed by: INTERNAL MEDICINE

## 2022-08-23 PROCEDURE — 1036F TOBACCO NON-USER: CPT | Performed by: INTERNAL MEDICINE

## 2022-08-23 PROCEDURE — G8427 DOCREV CUR MEDS BY ELIG CLIN: HCPCS | Performed by: INTERNAL MEDICINE

## 2022-08-23 PROCEDURE — 3017F COLORECTAL CA SCREEN DOC REV: CPT | Performed by: INTERNAL MEDICINE

## 2022-08-23 PROCEDURE — 93000 ELECTROCARDIOGRAM COMPLETE: CPT | Performed by: INTERNAL MEDICINE

## 2022-08-23 PROCEDURE — 99214 OFFICE O/P EST MOD 30 MIN: CPT | Performed by: INTERNAL MEDICINE

## 2022-08-23 NOTE — LETTER
[facility] [place] [address] [address]  [facility]  Dept: [phone]  Dept Fax: [phone]      2022    Noah Carl III  : 1970  DOS: 2022    To Whom it May Concern:    Alex Carl III has been evaluated for cardiac clearance. Based on diagnostic testing including ekg , Pia Vega III is considered at a low risk for moderate risk surgery. He will require lovenox bridging when he comes off of coumadin. There is no further cardiac testing that could be done to lower the risk. Please let my office know if you have any questions or concerns.           Dwain Montalvo, [address] [facility] [place]

## 2022-08-23 NOTE — LETTER
[address] [place] [address] [address]  Phone: [phone]  Fax: [address], DO    August 25, 2022     Beryl Tucker MD  [address]    Patient: Ying Su III   MR Number: [id number]   YOB: 1970   Date of Visit: 8/23/2022       Dear Beryl Tucker:    Thank you for referring Liyah Taylor to me for evaluation/treatment. Below are the relevant portions of my assessment and plan of care. If you have questions, please do not hesitate to call me. I look forward to following Lexus Catalan along with you.     Sincerely,      Placido Mackey, DO

## 2022-08-24 DIAGNOSIS — Z95.2 S/P AORTIC VALVE REPLACEMENT: ICD-10-CM

## 2022-08-24 DIAGNOSIS — I10 ESSENTIAL HYPERTENSION: ICD-10-CM

## 2022-08-24 RX ORDER — LISINOPRIL 10 MG/1
TABLET ORAL
Qty: 180 TABLET | Refills: 2 | Status: SHIPPED | OUTPATIENT
Start: 2022-08-24

## 2022-09-12 ENCOUNTER — ANTI-COAG VISIT (OUTPATIENT)
Dept: PHARMACY | Age: 52
End: 2022-09-12
Payer: COMMERCIAL

## 2022-09-12 DIAGNOSIS — Z95.2 S/P AVR (AORTIC VALVE REPLACEMENT): Primary | ICD-10-CM

## 2022-09-12 DIAGNOSIS — Z79.01 LONG TERM (CURRENT) USE OF ANTICOAGULANTS: ICD-10-CM

## 2022-09-12 LAB — INTERNATIONAL NORMALIZATION RATIO, POC: 2.6

## 2022-09-12 PROCEDURE — 99212 OFFICE O/P EST SF 10 MIN: CPT

## 2022-09-12 PROCEDURE — 85610 PROTHROMBIN TIME: CPT

## 2022-09-12 NOTE — PROGRESS NOTES
Mr. Ynes Earl is a 45 y.o. y/o male with history of Heart Valve Replacement who presents today for anticoagulation monitoring and adjustment. Pertinent PMH: HTN, PVD,   Pt allergic to sun so will be on prednisone in the summer   Pt has been on warfarin since ~2015  Hx pt of [facility] clinic. Patient Reported Findings:  Yes     No  [x]   []       Patient verifies current dosing regimen as listed pt normal weekly dose has been 10 mg on Mon and Fri and 5 mg all other days of the week ---> confirms dose   []   [x]       S/S bleeding/bruising/swelling/SOB- denies   []   [x]       Blood in urine or stool  [x]   []       Procedures scheduled in the future at this time needs to have hernia surgery, but was not cleared by cardiology so r/s---> got cleared, procedure not scheduled, will have to do lovenox shots and will hold 5 days (thinking November)  [x]   []       Missed Dose - last night    []   [x]       Extra Dose - Denies   [x]   []       Change in medications Takes prednisone tapers through summer months when has flare up of reaction (40 mg taper x 16 days) --> 6 day prednisone taper started 9/29---> inc lisinopril --> medrol dose victorino 1/4 and then had another one. and colchicine.  Took ibuprofen, stopped last week -->  Might be starting allopurinol---> started Allopurinol 3 weeks ago, ran out of lisinopril --> still taking allopurinol---> no changes but will have a pred taper end of May before vacation --> resumed prednisone so will be on for 2 weeks---> done with prednisone for now (thinks will have another round but will call us when that is), had a few OTC ASA for HA recently---> started pred taper on 9/8 for 2 weeks      []   [x]       Change in health/diet/appetite states that he will eat vit k occasionally every few weeks --> eating greens every day (green beans, lettuce, asparagus, broccoli) states that he has been doing this for long time --> maybe more greens, no NVD --> no changes---> less greens, no NVD---> no greens, no NVD   [x]   []       Change in alcohol use stopped drinking alcohol for a few weeks --> no changes --> no alcohol for a few weeks d/t gout --> had one beer beginning of year and caused another gout attack --> had more alcohol while was on vacation-->normal amounts    []   [x]       Change in activity  []   [x]       Hospital admission  []   [x]       Emergency department visit  []   [x]       Other complaints      Clinical Outcomes:  Yes     No  []   [x]       Major bleeding event  []   [x]       Thromboembolic event  Uses 5 mg tablets   Duration of warfarin Therapy: indefinite  INR Range:  2.5-3.5      INR is 2.6 today after skipping last night's dose dt pred taper that started  (2 weeks). Drop down to 5mg daily while on prednisone taper. Then when finished, continue to take 10 mg on Mon and Fri and 5 mg all other days of the week. Encouraged to maintain a consistency of vegetables/salads.     Return to clinic in 2 weeks,     **consent form signed 11/15/2021    Referring Dr. Jessica Tavarez   INR (no units)   Date Value   2020 3.29 (H)   2014 1.94 (H)   2014 2.46 (H)   2014 1.36 (H)     INR,(POC) (no units)   Date Value   2022 2.6   08/15/2022 3.7   2022 2.9   2022 3.4     For Pharmacy Admin Tracking Only    Intervention Detail: Adherence Monitorin and Dose Adjustment: 1, reason: Interaction, Therapy Optimization  Total # of Interventions Recommended: 2  Total # of Interventions Accepted: 2  Time Spent (min): 15

## 2022-09-16 ENCOUNTER — TELEPHONE (OUTPATIENT)
Dept: SURGERY | Age: 52
End: 2022-09-16

## 2022-09-16 NOTE — TELEPHONE ENCOUNTER
Pt got cardiac clearance and was wondering if he should get updated imaging and also to schedule surgery.   Please call [phone]

## 2022-09-22 DIAGNOSIS — I10 ESSENTIAL HYPERTENSION: ICD-10-CM

## 2022-09-22 DIAGNOSIS — Z95.2 S/P AORTIC VALVE REPLACEMENT: ICD-10-CM

## 2022-09-22 PROBLEM — Z01.810 PREOP CARDIOVASCULAR EXAM: Status: RESOLVED | Noted: 2022-08-23 | Resolved: 2022-09-22

## 2022-09-22 NOTE — TELEPHONE ENCOUNTER
Lov 8/23/2022  Labs no labs   Lrf 9/30/2021 Disp 90 day supply with 3 refills   Appt no appt scheduled

## 2022-09-23 RX ORDER — ATORVASTATIN CALCIUM 40 MG/1
TABLET, FILM COATED ORAL
Qty: 90 TABLET | Refills: 3 | Status: SHIPPED | OUTPATIENT
Start: 2022-09-23

## 2022-09-26 ENCOUNTER — ANTI-COAG VISIT (OUTPATIENT)
Dept: PHARMACY | Age: 52
End: 2022-09-26
Payer: COMMERCIAL

## 2022-09-26 DIAGNOSIS — Z79.01 LONG TERM (CURRENT) USE OF ANTICOAGULANTS: ICD-10-CM

## 2022-09-26 DIAGNOSIS — Z95.2 S/P AVR (AORTIC VALVE REPLACEMENT): Primary | ICD-10-CM

## 2022-09-26 LAB — INTERNATIONAL NORMALIZATION RATIO, POC: 2.5

## 2022-09-26 PROCEDURE — 99211 OFF/OP EST MAY X REQ PHY/QHP: CPT

## 2022-09-26 PROCEDURE — 85610 PROTHROMBIN TIME: CPT

## 2022-09-26 NOTE — PROGRESS NOTES
Mr. Do Moncada is a 45 y.o. y/o male with history of Heart Valve Replacement who presents today for anticoagulation monitoring and adjustment. Pertinent PMH: HTN, PVD,   Pt allergic to sun so will be on prednisone in the summer   Pt has been on warfarin since ~2015  Hx pt of [facility] clinic. Patient Reported Findings:  Yes     No  [x]   []       Patient verifies current dosing regimen as listed pt normal weekly dose has been 10 mg on Mon and Fri and 5 mg all other days of the week ---> confirms dose   []   [x]       S/S bleeding/bruising/swelling/SOB- denies   []   [x]       Blood in urine or stool  [x]   []       Procedures scheduled in the future at this time needs to have hernia surgery, but was not cleared by cardiology so r/s---> got cleared, procedure not scheduled, will have to do lovenox shots and will hold 5 days (November 2)  [x]   []       Missed Dose - Saturday     []   [x]       Extra Dose - Denies   [x]   []       Change in medications Takes prednisone tapers through summer months when has flare up of reaction (40 mg taper x 16 days) --> 6 day prednisone taper started 9/29---> inc lisinopril --> medrol dose victorino 1/4 and then had another one. and colchicine.  Took ibuprofen, stopped last week -->  Might be starting allopurinol---> started Allopurinol 3 weeks ago, ran out of lisinopril --> still taking allopurinol---> no changes but will have a pred taper end of May before vacation --> resumed prednisone so will be on for 2 weeks---> done with prednisone for now (thinks will have another round but will call us when that is), had a few OTC ASA for HA recently---> started pred taper on 9/8 for 2 weeks---> finished last week Wed he thinks, no other changes      []   [x]       Change in health/diet/appetite states that he will eat vit k occasionally every few weeks --> eating greens every day (green beans, lettuce, asparagus, broccoli) states that he has been doing this for long time --> maybe more greens, no NVD --> no changes---> less greens, no NVD---> no greens, no NVD   [x]   []       Change in alcohol use stopped drinking alcohol for a few weeks --> no changes --> no alcohol for a few weeks d/t gout --> had one beer beginning of year and caused another gout attack --> had more alcohol while was on vacation-->normal amounts    []   [x]       Change in activity  []   [x]       Hospital admission  []   [x]       Emergency department visit  []   [x]       Other complaints      Clinical Outcomes:  Yes     No  []   [x]       Major bleeding event  []   [x]       Thromboembolic event  Uses 5 mg tablets   Duration of warfarin Therapy: indefinite  INR Range:  2.5-3.5      INR is 2.5 today after adjusting for pred taper (not sure if he took 5mg or 10mg on Mon ) and then missing Saturday's dose. Since   Continue to take 10 mg on Mon and Fri and 5 mg all other days of the week. Encouraged to maintain a consistency of vegetables/salads.     Return to clinic in 2 weeks, 10/10    **consent form signed 11/15/2021    Referring Dr. Aniket Marcial   INR (no units)   Date Value   2020 3.29 (H)   2014 1.94 (H)   2014 2.46 (H)   2014 1.36 (H)     INR,(POC) (no units)   Date Value   2022 2.5   2022 2.6   08/15/2022 3.7   2022 2.9     For Pharmacy Admin Tracking Only    Intervention Detail: Adherence Monitorin  Total # of Interventions Recommended: 1  Total # of Interventions Accepted: 1  Time Spent (min): 15

## 2022-10-17 ENCOUNTER — ANTI-COAG VISIT (OUTPATIENT)
Dept: PHARMACY | Age: 52
End: 2022-10-17
Payer: COMMERCIAL

## 2022-10-17 DIAGNOSIS — Z95.2 S/P AVR (AORTIC VALVE REPLACEMENT): Primary | ICD-10-CM

## 2022-10-17 DIAGNOSIS — Z79.01 LONG TERM (CURRENT) USE OF ANTICOAGULANTS: ICD-10-CM

## 2022-10-17 LAB — INTERNATIONAL NORMALIZATION RATIO, POC: 2.9

## 2022-10-17 PROCEDURE — 85610 PROTHROMBIN TIME: CPT

## 2022-10-17 PROCEDURE — 99212 OFFICE O/P EST SF 10 MIN: CPT

## 2022-10-17 NOTE — PROGRESS NOTES
Mr. Dickson Moreno is a 45 y.o. y/o male with history of Heart Valve Replacement who presents today for anticoagulation monitoring and adjustment. Pertinent PMH: HTN, PVD,   Pt allergic to sun so will be on prednisone in the summer   Pt has been on warfarin since ~2015  Hx pt of [facility] [place] clinic. Patient Reported Findings:  Yes     No  [x]   []       Patient verifies current dosing regimen as listed pt normal weekly dose has been 10 mg on Mon and Fri and 5 mg all other days of the week ---> confirms dose   []   [x]       S/S bleeding/bruising/swelling/SOB- denies   []   [x]       Blood in urine or stool  [x]   []       Procedures scheduled in the future at this time needs to have hernia surgery, but was not cleared by cardiology so r/s---> got cleared, procedure not scheduled, will have to do lovenox shots and will hold 5 days (November 2)  [x]   []       Missed Dose - Saturday     []   [x]       Extra Dose - Denies   [x]   []       Change in medications Takes prednisone tapers through summer months when has flare up of reaction (40 mg taper x 16 days) --> 6 day prednisone taper started 9/29---> inc lisinopril --> medrol dose victorino 1/4 and then had another one. and colchicine.  Took ibuprofen, stopped last week -->  Might be starting allopurinol---> started Allopurinol 3 weeks ago, ran out of lisinopril --> still taking allopurinol---> no changes but will have a pred taper end of May before vacation --> resumed prednisone so will be on for 2 weeks---> done with prednisone for now (thinks will have another round but will call us when that is), had a few OTC ASA for HA recently---> started pred taper on 9/8 for 2 weeks---> finished last week Wed he thinks, no other changes      []   [x]       Change in health/diet/appetite states that he will eat vit k occasionally every few weeks --> eating greens every day (green beans, lettuce, asparagus, broccoli) states that he has been doing this for long time --> maybe more greens, no NVD --> no changes---> less greens, no NVD---> no greens, no NVD   [x]   []       Change in alcohol use stopped drinking alcohol for a few weeks --> no changes --> no alcohol for a few weeks d/t gout --> had one beer beginning of year and caused another gout attack --> had more alcohol while was on vacation-->normal amounts    []   [x]       Change in activity  []   [x]       Hospital admission  []   [x]       Emergency department visit  []   [x]       Other complaints      Clinical Outcomes:  Yes     No  []   [x]       Major bleeding event  []   [x]       Thromboembolic event  Uses 5 mg tablets   Duration of warfarin Therapy: indefinite  INR Range:  2.5-3.5      INR is 2.9 today   Continue to take 10 mg on Mon and Fri and 5 mg all other days of the week. Start holding 10/28 for 5 days prior to procedure 11/2. Resume warfarin 11/2 and take 10mg for 2 days then continue normal dose. Follow bridging schedule. Encouraged to maintain a consistency of vegetables/salads. Lovenox called into [facility] and warfarin RF. Return to clinic in 3 weeks, 11/7- post procedure 11/2    **consent form signed 11/15/2021    Referring Dr. Evin Toribio   INR (no units)   Date Value   04/30/2020 3.29 (H)   09/18/2014 1.94 (H)   08/14/2014 2.46 (H)   08/13/2014 1.36 (H)     INR,(POC) (no units)   Date Value   10/17/2022 2.9   09/26/2022 2.5   09/12/2022 2.6   08/15/2022 3.7     For Pharmacy Admin Tracking Only    Intervention Detail: Dose Adjustment: 1, reason: Therapy De-escalation, Therapy Optimization, New Rx: 1, reason: Needs Additional Therapy, and Refill(s) Provided  Total # of Interventions Recommended: 3  Total # of Interventions Accepted: 3  Time Spent (min): 1657 Staci [employer] (Enoxaparin) Bridging Directions                  Name Kathy RIOJAS 1970 Indication for Warfarin Valve                Procedure     Date of Procedure: 11/2/2022               Current warfarin dose 10 mg MF; 5mg AOD  Most recent INR:                Renal dose?      Weight: 122.9kg                Day  Date Warfarin Dose  Lovenox Dose    5 days before  10/28/2022 Stop warfarin  8 AM: No Lovenox  8 PM: No Lovenox    4 days before  10/29/2022 No warfarin   8 AM: No Lovenox   8 PM: 120 mg injection   3 days before  10/30/2022 No warfarin   8 AM: 120 mg injection  8 PM: 120 mg injection   2 days before  10/31/2022 No warfarin   8 AM: 120 mg injection  8 PM: 120 mg injection   1 day before  11/1/2022 No Warfarin    8 AM: 120 mg injection  8 PM: NO LOVENOX   Day of procedure 11/2/2022 Take  10 mg of warfarin in the evening 8 AM: NO LOVENOX  8 PM: 120 mg injection   1 day after  11/3/2022 Take  10 mg of warfarin in the evening 8 AM: 120 mg injection  8 PM: 120 mg injection   2 days after  11/4/2022 Take 10 mg of warfarin in the evening 8 AM: 120 mg injection  8 PM: 120 mg injection   3 days after  11/5/2022 Take 5 mg of warfarin in the evening 8 AM: 120 mg injection  8 PM: 120 mg injection   4 days after  11/6/2022 Take 5 mg of warfarin in the evening 8 AM: 120 mg injection  8 PM: 120 mg injection   5 days after  11/7/2022 Dependent upon INR  8 AM: 120 mg injection  8 PM: Depenent upon INR   Comments:  appointment in clinic 11/7 to check INR                                                    Please contact the Anticoagulation Clinic at [phone] with any questions

## 2022-10-17 NOTE — TELEPHONE ENCOUNTER
Lovenox prescription phoned into [facility]/pharmacy #[id number]- [place], [place] - [address]. Nilson Nuñez [phone] Pascual Huang [phone] under Dr. Roberto Chao 120 mg syringes  Inject 120mg BID SQ AD  20 syringes  1 refill    Garth Carney PharmD, [facility]    Warfarin prescription phoned into [facility]/pharmacy #[id number] - [place], [address]  Nilson Nuñez [phone] Pascual Huang [phone] under Dr. Elma Sal  Warfarin 5 mg tabs  Take 10 mg (5 mg x 2) every Mon, Fri; 5 mg (5 mg x 1) all other days  90 days   2 refills    Garth Carney PharmD, [facility]

## 2022-10-31 ENCOUNTER — TELEPHONE (OUTPATIENT)
Dept: CARDIOLOGY CLINIC | Age: 52
End: 2022-10-31

## 2022-10-31 ENCOUNTER — TELEPHONE (OUTPATIENT)
Dept: PHARMACY | Age: 52
End: 2022-10-31

## 2022-10-31 DIAGNOSIS — T14.8XXA BRUISING: Primary | ICD-10-CM

## 2022-10-31 NOTE — TELEPHONE ENCOUNTER
Called patient to discuss. States a massive bruise appeared Sunday morning. He states he started the lovenox Friday night instead of Saturday night. Explained need to follow schedule and we normally suggest waiting to start lovenox until the second evening of holding to give warfarin time to leave the system before introducing another blood thinner as this can worsen bleeding/bruising. Patient states that he was also nervous to give the injections and may have \"rammed\" it in the first time which could have contributed to the bruise. Bruise is not spreading but has not changed color yet. Explained the INR is only a measure of warfarin, it will not measure lovenox. Since he followed the instructions and stopped warfarin on Friday, his INR should be dropping appropriately. Assuming the provider he talked to did not realize he was off warfarin and bridging prior to his procedure and assumed he was back on warfarin in addition to lovenox and needed INR checked to make sure he wasn't supratherapeutic already. Explained we can still check INR tomorrow to coordinate with lab work if he would like that and we can observe the bruise and recommend going to the ER if we find it concerning. Pt states he is also waiting to hear back from cardio if he can just stop the shots and not bridge anymore. He will call if that is the case. For now, we kept apt for tomorrow morning.     Neri Adrian, YarielD, [facility]

## 2022-10-31 NOTE — TELEPHONE ENCOUNTER
Pt wanted to schedule CC appt tomorrow, hes been bridging w/ Lovenox for procedure on 11/2. States he has a bruise about 1 ft long on stomach from shots, his pharmacist advised him to have INR checked. Scheduled an appt tomorrow morning.

## 2022-10-31 NOTE — TELEPHONE ENCOUNTER
Pt reports DKW prescribed \"Anox\" shots back in August to ween him off of Warfarin and he has a bruise that goes across his stomach and wants to know if it's normal. I do not see this in pt's chart. Please see and advise.

## 2022-10-31 NOTE — TELEPHONE ENCOUNTER
Pt states he is taking shots in his stomach for upcoming surgery on Wednesday 11/2/22. Pt states he is having very bad bruising at the injection sites and needs to know if this is normal or if something is going on. Please advise pt.

## 2022-11-01 ENCOUNTER — HOSPITAL ENCOUNTER (OUTPATIENT)
Age: 52
Discharge: HOME | End: 2022-11-01
Payer: COMMERCIAL

## 2022-11-01 ENCOUNTER — OFFICE VISIT (OUTPATIENT)
Dept: CARDIOLOGY CLINIC | Age: 52
End: 2022-11-01
Payer: COMMERCIAL

## 2022-11-01 ENCOUNTER — TELEPHONE (OUTPATIENT)
Dept: CARDIOLOGY CLINIC | Age: 52
End: 2022-11-01

## 2022-11-01 ENCOUNTER — ANTI-COAG VISIT (OUTPATIENT)
Dept: PHARMACY | Age: 52
End: 2022-11-01
Payer: COMMERCIAL

## 2022-11-01 VITALS
BODY MASS INDEX: 38.64 KG/M2 | SYSTOLIC BLOOD PRESSURE: 126 MMHG | HEART RATE: 83 BPM | DIASTOLIC BLOOD PRESSURE: 80 MMHG | WEIGHT: 276 LBS | HEIGHT: 71 IN | OXYGEN SATURATION: 99 %

## 2022-11-01 DIAGNOSIS — Z95.2 S/P AVR (AORTIC VALVE REPLACEMENT): Primary | ICD-10-CM

## 2022-11-01 DIAGNOSIS — Z95.2 S/P AORTIC VALVE REPLACEMENT: ICD-10-CM

## 2022-11-01 DIAGNOSIS — K43.9 VENTRAL HERNIA WITHOUT OBSTRUCTION OR GANGRENE: ICD-10-CM

## 2022-11-01 DIAGNOSIS — T14.8XXA BRUISING: ICD-10-CM

## 2022-11-01 DIAGNOSIS — Z01.810 PREOP CARDIOVASCULAR EXAM: ICD-10-CM

## 2022-11-01 DIAGNOSIS — S30.1XXA CONTUSION OF ABDOMINAL WALL, INITIAL ENCOUNTER: Primary | ICD-10-CM

## 2022-11-01 DIAGNOSIS — Z79.01 LONG TERM (CURRENT) USE OF ANTICOAGULANTS: ICD-10-CM

## 2022-11-01 LAB
BASOPHILS ABSOLUTE: 0.1 K/UL (ref 0–0.2)
BASOPHILS RELATIVE PERCENT: 0.9 %
EOSINOPHILS ABSOLUTE: 0.1 K/UL (ref 0–0.6)
EOSINOPHILS RELATIVE PERCENT: 2.2 %
HCT VFR BLD CALC: 40.7 % (ref 40.5–52.5)
HEMOGLOBIN: 13.9 G/DL (ref 13.5–17.5)
INTERNATIONAL NORMALIZATION RATIO, POC: 1.1
LYMPHOCYTES ABSOLUTE: 1.8 K/UL (ref 1–5.1)
LYMPHOCYTES RELATIVE PERCENT: 29 %
MCH RBC QN AUTO: 32.6 PG (ref 26–34)
MCHC RBC AUTO-ENTMCNC: 34.1 G/DL (ref 31–36)
MCV RBC AUTO: 95.8 FL (ref 80–100)
MONOCYTES ABSOLUTE: 0.5 K/UL (ref 0–1.3)
MONOCYTES RELATIVE PERCENT: 8 %
NEUTROPHILS ABSOLUTE: 3.7 K/UL (ref 1.7–7.7)
NEUTROPHILS RELATIVE PERCENT: 59.9 %
PDW BLD-RTO: 14.3 % (ref 12.4–15.4)
PLATELET # BLD: 170 K/UL (ref 135–450)
PMV BLD AUTO: 9.2 FL (ref 5–10.5)
RBC # BLD: 4.25 M/UL (ref 4.2–5.9)
WBC # BLD: 6.1 K/UL (ref 4–11)

## 2022-11-01 PROCEDURE — 3017F COLORECTAL CA SCREEN DOC REV: CPT | Performed by: INTERNAL MEDICINE

## 2022-11-01 PROCEDURE — 3074F SYST BP LT 130 MM HG: CPT | Performed by: INTERNAL MEDICINE

## 2022-11-01 PROCEDURE — 85025 COMPLETE CBC W/AUTO DIFF WBC: CPT

## 2022-11-01 PROCEDURE — G8484 FLU IMMUNIZE NO ADMIN: HCPCS | Performed by: INTERNAL MEDICINE

## 2022-11-01 PROCEDURE — 85610 PROTHROMBIN TIME: CPT

## 2022-11-01 PROCEDURE — 36415 COLL VENOUS BLD VENIPUNCTURE: CPT

## 2022-11-01 PROCEDURE — 3078F DIAST BP <80 MM HG: CPT | Performed by: INTERNAL MEDICINE

## 2022-11-01 PROCEDURE — 99214 OFFICE O/P EST MOD 30 MIN: CPT | Performed by: INTERNAL MEDICINE

## 2022-11-01 PROCEDURE — 4004F PT TOBACCO SCREEN RCVD TLK: CPT | Performed by: INTERNAL MEDICINE

## 2022-11-01 PROCEDURE — G8417 CALC BMI ABV UP PARAM F/U: HCPCS | Performed by: INTERNAL MEDICINE

## 2022-11-01 PROCEDURE — G8427 DOCREV CUR MEDS BY ELIG CLIN: HCPCS | Performed by: INTERNAL MEDICINE

## 2022-11-01 PROCEDURE — 99211 OFF/OP EST MAY X REQ PHY/QHP: CPT

## 2022-11-01 RX ORDER — ENOXAPARIN SODIUM 150 MG/ML
INJECTION SUBCUTANEOUS
COMMUNITY
Start: 2022-10-17

## 2022-11-01 ASSESSMENT — ENCOUNTER SYMPTOMS
ABDOMINAL DISTENTION: 0
BLOOD IN STOOL: 0
COUGH: 0
CHEST TIGHTNESS: 0
PHOTOPHOBIA: 0
SHORTNESS OF BREATH: 0
NAUSEA: 0
ABDOMINAL PAIN: 0

## 2022-11-01 NOTE — PATIENT INSTRUCTIONS
Change lovenox to 100 mg sq post op  Resume coumadin at discretion of surgeon  Follow up as scheduled

## 2022-11-01 NOTE — LETTER
[address] [place] [address] Cardiology [address] [address]  Phone: [phone]  Fax: [phone]    Jaky Deng DO    November 1, 2022     Barbara Santos, [address]    Patient: Dom Valdez III   MR Number: [id number]   YOB: 1970   Date of Visit: 11/1/2022       Dear Barbara Santos:    Thank you for referring Scot Prader to me for evaluation/treatment. Below are the relevant portions of my assessment and plan of care. If you have questions, please do not hesitate to call me. I look forward to following Jenni Joseph along with you.     Sincerely,      Jaky Deng DO

## 2022-11-01 NOTE — TELEPHONE ENCOUNTER
Pt called requesting to speak with [facility]. Pt saw our phone number in his phone and per Pt he missed a call from us. Pt is wanting to know about any changes to his med. Please call to discuss.   Thank you

## 2022-11-01 NOTE — Clinical Note
This fellow got a little aggressive with his lovenox shots to bridge his valve. Superficial ecchymosis of the lower abdomen. I saw him today. I think he should be fine for surgery. INR is 1.1 today. Let me know if you have any questions.  Kevan Ordonez [phone]

## 2022-11-01 NOTE — PROGRESS NOTES
Mr. Lillie Ayala is a 45 y.o. y/o male with history of Heart Valve Replacement who presents today for anticoagulation monitoring and adjustment. Pertinent PMH: HTN, PVD,   Pt allergic to sun so will be on prednisone in the summer   Pt has been on warfarin since ~2015  Hx pt of [facility]. Patient Reported Findings:  Yes     No  [x]   []       Patient verifies current dosing regimen as listed pt normal weekly dose has been 10 mg on Mon and Fri and 5 mg all other days of the week ---> confirms dose   []   [x]       S/S bleeding/bruising/swelling/SOB- denies   []   [x]       Blood in urine or stool  [x]   []       Procedures scheduled in the future at this time needs to have hernia surgery, but was not cleared by cardiology so r/s---> got cleared, procedure not scheduled, will have to do lovenox shots and will hold 5 days (November 2)  [x]   []       Missed Dose - Stopped warfarin on Friday     []   [x]       Extra Dose - Denies   []   [x]       Change in medications Takes prednisone tapers through summer months when has flare up of reaction (40 mg taper x 16 days) --> 6 day prednisone taper started 9/29---> inc lisinopril --> medrol dose victorino 1/4 and then had another one. and colchicine.  Took ibuprofen, stopped last week -->  Might be starting allopurinol---> started Allopurinol 3 weeks ago, ran out of lisinopril --> still taking allopurinol---> no changes but will have a pred taper end of May before vacation --> resumed prednisone so will be on for 2 weeks---> done with prednisone for now (thinks will have another round but will call us when that is), had a few OTC ASA for HA recently---> started pred taper on 9/8 for 2 weeks---> finished last week Wed he thinks, no other changes --> no changes      []   [x]       Change in health/diet/appetite states that he will eat vit k occasionally every few weeks --> eating greens every day (green beans, lettuce, asparagus, broccoli) states that he has been doing this for long time --> maybe more greens, no NVD ---> less greens, no NVD---> no greens, no NVD --> no changes  []   [x]       Change in alcohol use stopped drinking alcohol for a few weeks --> no changes --> no alcohol for a few weeks d/t gout --> had one beer beginning of year and caused another gout attack --> had more alcohol while was on vacation-->normal amounts    []   [x]       Change in activity  []   [x]       Hospital admission  []   [x]       Emergency department visit  []   [x]       Other complaints      Clinical Outcomes:  Yes     No  []   [x]       Major bleeding event  []   [x]       Thromboembolic event  Uses 5 mg tablets   Duration of warfarin Therapy: indefinite  INR Range:  2.5-3.5      Presents today early d/t bruise on stomach from lovenox injections. Per conversation with pharmacist last night, he started lovenox injections too early and believes he might have injected himself too hard first few times     INR is 1.1 today d/t holding warfarin since Friday   Pt frustrated as he believes he was told to start lovenox 5 days prior to surgery and if he wasn't told that why was he given 10 shots of lovenox. Explained that script was for 20 shots d/t bridging pre and post op. Verified he has 2 boxes of lovenox  Pt states he never received bridging instructions (see below, was provided and reviewed at previous appt). Reprinted for patient and reviewed. Then stated that it was rushed and instructions werent clear. Again reviewed bridging schedule with dates and instructions. Continue holding warfarin until procedure 11/2. Resume warfarin 11/2 and take 10mg for 2 days then continue normal dose. Follow bridging schedule. Pt believes surgery will be cancelled d/t bruising, advised for pt to call clinic if surgery cancelled in order to provide dosing for warfarin to resume today. Encouraged to maintain a consistency of vegetables/salads.     Return to clinic in 1 week, 11/7- post procedure 11/2    **consent form signed 11/15/2021    Referring Dr. Yohan Osborne   INR (no units)   Date Value   04/30/2020 3.29 (H)   09/18/2014 1.94 (H)   08/14/2014 2.46 (H)   08/13/2014 1.36 (H)     INR,(POC) (no units)   Date Value   10/17/2022 2.9   09/26/2022 2.5   09/12/2022 2.6   08/15/2022 3.7     For Pharmacy Admin Tracking Only    Intervention Detail: Dose Adjustment: 1, reason: Therapy Optimization  Total # of Interventions Recommended: 1  Total # of Interventions Accepted: 1  Time Spent (min): [facility] (Enoxaparin) Bridging Directions                  Name Pacheco Inch. O.B 1970 Indication for Warfarin Valve                Procedure     Date of Procedure: 11/2/2022               Current warfarin dose 10 mg MF; 5mg AOD  Most recent INR:                Renal dose?      Weight: 122.9kg                Day  Date Warfarin Dose  Lovenox Dose    5 days before  10/28/2022 Stop warfarin  8 AM: No Lovenox  8 PM: No Lovenox    4 days before  10/29/2022 No warfarin   8 AM: No Lovenox   8 PM: 120 mg injection   3 days before  10/30/2022 No warfarin   8 AM: 120 mg injection  8 PM: 120 mg injection   2 days before  10/31/2022 No warfarin   8 AM: 120 mg injection  8 PM: 120 mg injection   1 day before  11/1/2022 No Warfarin    8 AM: 120 mg injection  8 PM: NO LOVENOX   Day of procedure 11/2/2022 Take  10 mg of warfarin in the evening 8 AM: NO LOVENOX  8 PM: 120 mg injection   1 day after  11/3/2022 Take  10 mg of warfarin in the evening 8 AM: 120 mg injection  8 PM: 120 mg injection   2 days after  11/4/2022 Take 10 mg of warfarin in the evening 8 AM: 120 mg injection  8 PM: 120 mg injection   3 days after  11/5/2022 Take 5 mg of warfarin in the evening 8 AM: 120 mg injection  8 PM: 120 mg injection   4 days after  11/6/2022 Take 5 mg of warfarin in the evening 8 AM: 120 mg injection  8 PM: 120 mg injection   5 days after  11/7/2022 Dependent upon INR  8 AM: 120 mg injection  8 PM: Depenent upon INR   Comments:  appointment in clinic 11/7 to check INR                                                    Please contact the Anticoagulation Clinic at [phone] with any questions

## 2022-11-01 NOTE — PROGRESS NOTES
[address]  11/1/22  Referring: Dr. Rema Potter CONSULT/CHIEF COMPLAINT/HPI     Reason for visit/ Chief complaint  One year follow up  AVR   HPI Naomi Barrientos III is a 45 y. o. who has a history fof AVR. He had normal coronaries in 2014. He is treated for hypertension, hyperlipidemia. He is seen today for an acute visit for abdominal bruising. Last Thursday he took his last coumadin dose, mistakenly started lovenox the next day. On Sunday he noticed a bruise along his abdomen. The bruise does not hurt, no pain or visible GI/ blood loss. No fever or chills, no flank bruising or bleeding. His AS was a incidental finding prior an elective hernia repair. After the AVR he had much more energy. His wife is going to also have a cervix mass removed 2 days before thanksgiving. He has no chest pain, shortness of breath, palpitations or dizziness. No edema or orthopnea. Patient is compliant with medications and is tolerating them well without side effects     HISTORY/ALLERGIES/ROS     MedHx:  has a past medical history of Aortic stenosis, Colon polyp, GERD (gastroesophageal reflux disease), Headache(784.0), Hernia, Hyperlipidemia, Hypertension, and Neuropathy. SurgHx:  has a past surgical history that includes Appendectomy; Colonoscopy; Aortic valve replacement (08/08/2014); and Cardiac surgery. SocHx:  reports that he has been smoking cigarettes. He has a 5.00 pack-year smoking history. He has never used smokeless tobacco. He reports current alcohol use of about 3.0 standard drinks per week. He reports current drug use. Drug: Marijuana Alexia Legions). FamHx: Sister with \"Aneurysm\"  Allergies: Patient has no known allergies. ROS:   Review of Systems   Constitutional:  Negative for activity change, diaphoresis, fatigue and fever. HENT:  Negative for congestion and ear discharge. Eyes:  Negative for photophobia and visual disturbance.    Respiratory:  Negative for cough, chest tightness and shortness of breath. Cardiovascular:  Negative for chest pain and palpitations. Gastrointestinal:  Negative for abdominal distention, abdominal pain, blood in stool and nausea. Endocrine: Negative for cold intolerance and polydipsia. Genitourinary:  Negative for difficulty urinating and flank pain. Musculoskeletal:  Positive for arthralgias and myalgias. Skin:  Negative for rash and wound. +bruise   Allergic/Immunologic: Negative for environmental allergies and immunocompromised state. Neurological:  Negative for dizziness and headaches. Hematological:  Negative for adenopathy. Does not bruise/bleed easily. Psychiatric/Behavioral:  Negative for confusion. The patient is not hyperactive. MEDICATIONS      Prior to Admission medications    Medication Sig Start Date End Date Taking? Authorizing Provider   enoxaparin (LOVENOX) 120 MG/0.8ML injection INJECT 1 SYRINGE SUBCUTANEOUSLY TWICE A DAY AS DIRECTED 10/17/22   Historical Provider, MD   metoprolol tartrate (LOPRESSOR) 25 MG tablet TAKE 1 TABLET BY MOUTH TWICE A DAY 9/23/22   Godfrey Winkler, DO   atorvastatin (LIPITOR) 40 MG tablet TAKE 1 TABLET BY MOUTH EVERY DAY 9/23/22   Godfrey Winkler, DO   lisinopril (PRINIVIL;ZESTRIL) 10 MG tablet TAKE 1 TABLET BY MOUTH IN THE MORNING AND IN THE EVENING 8/24/22   Godfrey Winkler, DO   ALLOPURINOL PO Take 300 mg by mouth daily    Historical Provider, MD   aspirin 81 MG EC tablet TAKE 1 TABLET BY MOUTH EVERY DAY 4/18/22   Godfrey Winkler, DO   warfarin (COUMADIN) 5 MG tablet TAKE 2 TABLETS BY MOUTH TWICE A WEEK AND 1 TABLET FIVE TIMES WEEKLY. (TAKE 2 TABLETS ON MON AND FRI AND 1 TABLET ALL OTHER DAYS OF THE WEEK.) 3/17/22 8/23/22  Godfrey Winkler, DO   amoxicillin (AMOXIL) 500 MG capsule Take 1 capsule by mouth See Admin Instructions for 4 doses Take 2 gm one hour prior any dental procedure  Patient not taking: Reported on 11/1/2022 9/30/21   Godfrey Winkler, DO   predniSONE (DELTASONE) 10 MG tablet Take 10-40 mg by mouth as needed Seasonal (Summer only)  Patient not taking: Reported on 11/1/2022 5/22/19   Historical Provider, MD       PHYSICAL EXAM        Vitals:    11/01/22 1205   BP: 126/80   Pulse: 83   SpO2: 99%        Weight: 276 lb (125.2 kg)     Gen Alert, cooperative, no distress Heart  Regular rate and rhythm, click, 1/6 systolic murmur   Head Normocephalic, atraumatic, no abnormalities Abd  Soft, NT, +BS, no mass, no flank tenderness  5 cm x 12 ecchymosis of abdominal wall   Eyes PERRLA, conj/corn clear Ext  Ext nl, AT, no C/C, no edema   Nose Nares normal, no drain age, Non-tender Pulse 2+ and symmetric   Throat Lips, mucosa, tongue normal Skin Color/text/turg nl, +sun-induced rash   Neck S/S, TM, NT, no bruit Psych Nl mood and affect   Lung CTA-B, unlabored, no DTP     Ch wall NT, sternotomy        LABS and Imaging     Relevant and available CV data reviewed  Echo/MRI: 9/30/2021 Summary   -Normal left ventricle size and systolic function with an estimated ejection   fraction of 55-60%. -No regional wall motion abnormalities are seen. -There is moderate concentric left ventricular hypertrophy.  -Normal diastolic function. -Mild mitral regurgitation is present.  -A mechanical artificial aortic valve appears well seated with a maximum   velocity of 2.23m/s and a mean gradient of 11mmHg. -Right ventricular systolic function is mildly reduced . -Mild tricuspid regurgitation.   -The right atrium is dilated.   Cath: 2014  Normal cornaries  Holter  none  EKG: sinus rhythm,  Personally interpreted 8/23/22  Stress: none  moderate Risk  moderate Complexity/Medical Decision Making  Outside records Reviewed  Labs Reviewed  Prior Imaging, ekg, cath, echo reviewed when available  Medications reviewed  Old Notes reviewed  ASSESSMENT AND PLAN   Superficial bruising of abdominal wall  -    new problem  -    off coumadin, INR 1.1. on lovenox  -   no tenderness, flank pain, or warning signs  -    robotic assistant hernia repair tomorrow  Plan  -   currently off of warfarin   -   post operatively restart lovenox 100 mg bid and warfarin when okay with surgical team    2. Ventral hernia  -    new problem  Plan  - surgery planned ventral hernia repair with mesh  - patient is low risk for moderate risk surgery. Continue metoprolol    3. Mechanical aortic valve for bicuspid aortic stenosis   -     bicuspid AV with replacement  -     on coumadin-- managed at [facility] cou[facility]  -     Normal coronaries  -     stable  Plan  -     amoxicillin 2 gm one hour prior dental work. Discussed importance of regular dental work and seeking medical attention for fever  -     recommend family members be screened for bicuspid valve   -     still has not asked sister about her cardiac history  -     continue warfarin and aspirin     4. Essential Hypertension  -     118/74  -stable  Plan  -     continue lisinopril and lopressor  -     will work on weight loss      5. Mixed Hyperlipidemia  -    9/4/2020 ldl 112 tri 179  -    3/1/22  tc 199 tri 172 hdl 62 ldl 103  -stable  Plan  -    continue lipitor      6. Overweight  -    BMI 37.8  Plan  -    goal weight next visit 250    7. Family history of aneurysm with bicuspic aortic valve  -    half sister had \"heart anuerysm surgery\"  Plan  -    cta of aorta to assess ascending aorta      Patient counseled on lifestyle modification, diet, and exercise. Follow Up: One year    Dr. Gunner Mayers:  I, Matilde Olvera, am scribing for and in the presence of Aldo Duarte 11/01/22 12:11 PM   Physician Attestation  The scribe for and in the presence of me Dotty Carroll DO). The scribe Kizzy Pitt may have prepopulated components of this note with my historical  intellectual property under my direct supervision.   Any additions to this intellectual property were performed in my presence and at my direction. Furthermore, the content and accuracy of this note have been reviewed by RED Pathak.   11/1/2022 12:11 PM

## 2022-11-01 NOTE — PROGRESS NOTES
Called patient this morning for PAT interview. Patient states he is here at [facility] for blood draw and will be seeing his cardiologist today at 1200. He stated yesterday in our brief conversation that he has been on a lovenox bridge since coming off coumadin and had bruising  on his abdomen. Called Sidra at Dr. Aisha Joseph office to notify her of the above.

## 2022-11-02 ENCOUNTER — ANESTHESIA EVENT (OUTPATIENT)
Dept: OPERATING ROOM | Age: 52
End: 2022-11-02
Payer: COMMERCIAL

## 2022-11-02 ENCOUNTER — ANESTHESIA (OUTPATIENT)
Dept: OPERATING ROOM | Age: 52
End: 2022-11-02
Payer: COMMERCIAL

## 2022-11-02 ENCOUNTER — HOSPITAL ENCOUNTER (OUTPATIENT)
Age: 52
Discharge: HOME | End: 2022-11-03
Attending: SURGERY | Admitting: SURGERY
Payer: COMMERCIAL

## 2022-11-02 DIAGNOSIS — K43.9 VENTRAL HERNIA WITHOUT OBSTRUCTION OR GANGRENE: Primary | ICD-10-CM

## 2022-11-02 LAB
APTT: 26.8 SEC (ref 23–34.3)
INR BLD: 1 (ref 0.87–1.14)
PROTHROMBIN TIME: 13.1 SEC (ref 11.7–14.5)

## 2022-11-02 PROCEDURE — 2500000003 HC RX 250 WO HCPCS: Performed by: SURGERY

## 2022-11-02 PROCEDURE — 2580000003 HC RX 258: Performed by: SURGERY

## 2022-11-02 PROCEDURE — 3600000019 HC SURGERY ROBOT ADDTL 15MIN: Performed by: SURGERY

## 2022-11-02 PROCEDURE — 3600000009 HC SURGERY ROBOT BASE: Performed by: SURGERY

## 2022-11-02 PROCEDURE — 7100000001 HC PACU RECOVERY - ADDTL 15 MIN: Performed by: SURGERY

## 2022-11-02 PROCEDURE — 6360000002 HC RX W HCPCS: Performed by: NURSE ANESTHETIST, CERTIFIED REGISTERED

## 2022-11-02 PROCEDURE — 7100000000 HC PACU RECOVERY - FIRST 15 MIN: Performed by: SURGERY

## 2022-11-02 PROCEDURE — 3700000001 HC ADD 15 MINUTES (ANESTHESIA): Performed by: SURGERY

## 2022-11-02 PROCEDURE — 2709999900 HC NON-CHARGEABLE SUPPLY: Performed by: SURGERY

## 2022-11-02 PROCEDURE — A4217 STERILE WATER/SALINE, 500 ML: HCPCS | Performed by: SURGERY

## 2022-11-02 PROCEDURE — 2500000003 HC RX 250 WO HCPCS: Performed by: NURSE ANESTHETIST, CERTIFIED REGISTERED

## 2022-11-02 PROCEDURE — 6360000002 HC RX W HCPCS: Performed by: ANESTHESIOLOGY

## 2022-11-02 PROCEDURE — 3700000000 HC ANESTHESIA ATTENDED CARE: Performed by: SURGERY

## 2022-11-02 PROCEDURE — 6360000002 HC RX W HCPCS: Performed by: SURGERY

## 2022-11-02 PROCEDURE — 6360000002 HC RX W HCPCS

## 2022-11-02 PROCEDURE — 85610 PROTHROMBIN TIME: CPT

## 2022-11-02 PROCEDURE — 36415 COLL VENOUS BLD VENIPUNCTURE: CPT

## 2022-11-02 PROCEDURE — 6370000000 HC RX 637 (ALT 250 FOR IP): Performed by: SURGERY

## 2022-11-02 PROCEDURE — C1781 MESH (IMPLANTABLE): HCPCS | Performed by: SURGERY

## 2022-11-02 PROCEDURE — 85730 THROMBOPLASTIN TIME PARTIAL: CPT

## 2022-11-02 PROCEDURE — S2900 ROBOTIC SURGICAL SYSTEM: HCPCS | Performed by: SURGERY

## 2022-11-02 PROCEDURE — 2580000003 HC RX 258: Performed by: ANESTHESIOLOGY

## 2022-11-02 DEVICE — MESH SURG DIA4.5IN CIR SEPRA TECHNOLOGY VENTRALIGHT: Type: IMPLANTABLE DEVICE | Site: ABDOMEN | Status: FUNCTIONAL

## 2022-11-02 RX ORDER — ENOXAPARIN SODIUM 100 MG/ML
40 INJECTION SUBCUTANEOUS DAILY
Status: DISCONTINUED | OUTPATIENT
Start: 2022-11-03 | End: 2022-11-03 | Stop reason: HOSPADM

## 2022-11-02 RX ORDER — SODIUM CHLORIDE, SODIUM LACTATE, POTASSIUM CHLORIDE, CALCIUM CHLORIDE 600; 310; 30; 20 MG/100ML; MG/100ML; MG/100ML; MG/100ML
INJECTION, SOLUTION INTRAVENOUS CONTINUOUS
Status: DISCONTINUED | OUTPATIENT
Start: 2022-11-02 | End: 2022-11-02 | Stop reason: HOSPADM

## 2022-11-02 RX ORDER — HYDROMORPHONE HCL 110MG/55ML
PATIENT CONTROLLED ANALGESIA SYRINGE INTRAVENOUS
Status: COMPLETED
Start: 2022-11-02 | End: 2022-11-02

## 2022-11-02 RX ORDER — SODIUM CHLORIDE 0.9 % (FLUSH) 0.9 %
5-40 SYRINGE (ML) INJECTION PRN
Status: DISCONTINUED | OUTPATIENT
Start: 2022-11-02 | End: 2022-11-02 | Stop reason: HOSPADM

## 2022-11-02 RX ORDER — ONDANSETRON 2 MG/ML
4 INJECTION INTRAMUSCULAR; INTRAVENOUS
Status: DISCONTINUED | OUTPATIENT
Start: 2022-11-02 | End: 2022-11-02 | Stop reason: HOSPADM

## 2022-11-02 RX ORDER — DEXTROSE, SODIUM CHLORIDE, AND POTASSIUM CHLORIDE 5; .45; .15 G/100ML; G/100ML; G/100ML
INJECTION INTRAVENOUS CONTINUOUS
Status: DISCONTINUED | OUTPATIENT
Start: 2022-11-02 | End: 2022-11-03

## 2022-11-02 RX ORDER — LIDOCAINE HYDROCHLORIDE 10 MG/ML
1 INJECTION, SOLUTION EPIDURAL; INFILTRATION; INTRACAUDAL; PERINEURAL
Status: DISCONTINUED | OUTPATIENT
Start: 2022-11-02 | End: 2022-11-02 | Stop reason: HOSPADM

## 2022-11-02 RX ORDER — SODIUM CHLORIDE 0.9 % (FLUSH) 0.9 %
5-40 SYRINGE (ML) INJECTION PRN
Status: DISCONTINUED | OUTPATIENT
Start: 2022-11-02 | End: 2022-11-03 | Stop reason: HOSPADM

## 2022-11-02 RX ORDER — SODIUM CHLORIDE 9 MG/ML
INJECTION, SOLUTION INTRAVENOUS PRN
Status: DISCONTINUED | OUTPATIENT
Start: 2022-11-02 | End: 2022-11-02 | Stop reason: HOSPADM

## 2022-11-02 RX ORDER — ASPIRIN 81 MG/1
81 TABLET ORAL DAILY
Status: DISCONTINUED | OUTPATIENT
Start: 2022-11-03 | End: 2022-11-03 | Stop reason: HOSPADM

## 2022-11-02 RX ORDER — KETOROLAC TROMETHAMINE 30 MG/ML
INJECTION, SOLUTION INTRAMUSCULAR; INTRAVENOUS PRN
Status: DISCONTINUED | OUTPATIENT
Start: 2022-11-02 | End: 2022-11-02 | Stop reason: SDUPTHER

## 2022-11-02 RX ORDER — DEXAMETHASONE SODIUM PHOSPHATE 4 MG/ML
INJECTION, SOLUTION INTRA-ARTICULAR; INTRALESIONAL; INTRAMUSCULAR; INTRAVENOUS; SOFT TISSUE PRN
Status: DISCONTINUED | OUTPATIENT
Start: 2022-11-02 | End: 2022-11-02 | Stop reason: SDUPTHER

## 2022-11-02 RX ORDER — LIDOCAINE HYDROCHLORIDE 20 MG/ML
INJECTION, SOLUTION EPIDURAL; INFILTRATION; INTRACAUDAL; PERINEURAL PRN
Status: DISCONTINUED | OUTPATIENT
Start: 2022-11-02 | End: 2022-11-02 | Stop reason: SDUPTHER

## 2022-11-02 RX ORDER — OXYCODONE HYDROCHLORIDE 5 MG/1
5 TABLET ORAL
Status: DISCONTINUED | OUTPATIENT
Start: 2022-11-02 | End: 2022-11-02 | Stop reason: HOSPADM

## 2022-11-02 RX ORDER — ROCURONIUM BROMIDE 10 MG/ML
INJECTION, SOLUTION INTRAVENOUS PRN
Status: DISCONTINUED | OUTPATIENT
Start: 2022-11-02 | End: 2022-11-02 | Stop reason: SDUPTHER

## 2022-11-02 RX ORDER — DEXMEDETOMIDINE HYDROCHLORIDE 100 UG/ML
INJECTION, SOLUTION INTRAVENOUS PRN
Status: DISCONTINUED | OUTPATIENT
Start: 2022-11-02 | End: 2022-11-02 | Stop reason: SDUPTHER

## 2022-11-02 RX ORDER — MAGNESIUM HYDROXIDE 1200 MG/15ML
LIQUID ORAL CONTINUOUS PRN
Status: COMPLETED | OUTPATIENT
Start: 2022-11-02 | End: 2022-11-02

## 2022-11-02 RX ORDER — ALLOPURINOL 300 MG/1
300 TABLET ORAL DAILY
Status: DISCONTINUED | OUTPATIENT
Start: 2022-11-02 | End: 2022-11-03 | Stop reason: HOSPADM

## 2022-11-02 RX ORDER — SODIUM CHLORIDE 9 MG/ML
INJECTION, SOLUTION INTRAVENOUS PRN
Status: DISCONTINUED | OUTPATIENT
Start: 2022-11-02 | End: 2022-11-03 | Stop reason: HOSPADM

## 2022-11-02 RX ORDER — FENTANYL CITRATE 50 UG/ML
INJECTION, SOLUTION INTRAMUSCULAR; INTRAVENOUS PRN
Status: DISCONTINUED | OUTPATIENT
Start: 2022-11-02 | End: 2022-11-02 | Stop reason: SDUPTHER

## 2022-11-02 RX ORDER — MEPERIDINE HYDROCHLORIDE 25 MG/ML
12.5 INJECTION INTRAMUSCULAR; INTRAVENOUS; SUBCUTANEOUS EVERY 5 MIN PRN
Status: DISCONTINUED | OUTPATIENT
Start: 2022-11-02 | End: 2022-11-02 | Stop reason: HOSPADM

## 2022-11-02 RX ORDER — PROPOFOL 10 MG/ML
INJECTION, EMULSION INTRAVENOUS PRN
Status: DISCONTINUED | OUTPATIENT
Start: 2022-11-02 | End: 2022-11-02 | Stop reason: SDUPTHER

## 2022-11-02 RX ORDER — LABETALOL HYDROCHLORIDE 5 MG/ML
10 INJECTION, SOLUTION INTRAVENOUS
Status: DISCONTINUED | OUTPATIENT
Start: 2022-11-02 | End: 2022-11-02 | Stop reason: HOSPADM

## 2022-11-02 RX ORDER — HYDRALAZINE HYDROCHLORIDE 20 MG/ML
10 INJECTION INTRAMUSCULAR; INTRAVENOUS
Status: DISCONTINUED | OUTPATIENT
Start: 2022-11-02 | End: 2022-11-02 | Stop reason: HOSPADM

## 2022-11-02 RX ORDER — SODIUM CHLORIDE 0.9 % (FLUSH) 0.9 %
5-40 SYRINGE (ML) INJECTION EVERY 12 HOURS SCHEDULED
Status: DISCONTINUED | OUTPATIENT
Start: 2022-11-02 | End: 2022-11-02 | Stop reason: HOSPADM

## 2022-11-02 RX ORDER — SODIUM CHLORIDE 0.9 % (FLUSH) 0.9 %
5-40 SYRINGE (ML) INJECTION EVERY 12 HOURS SCHEDULED
Status: DISCONTINUED | OUTPATIENT
Start: 2022-11-02 | End: 2022-11-03 | Stop reason: HOSPADM

## 2022-11-02 RX ORDER — HYDROMORPHONE HCL 110MG/55ML
1 PATIENT CONTROLLED ANALGESIA SYRINGE INTRAVENOUS
Status: DISCONTINUED | OUTPATIENT
Start: 2022-11-02 | End: 2022-11-03

## 2022-11-02 RX ORDER — BUPIVACAINE HYDROCHLORIDE AND EPINEPHRINE 5; 5 MG/ML; UG/ML
INJECTION, SOLUTION EPIDURAL; INTRACAUDAL; PERINEURAL
Status: COMPLETED | OUTPATIENT
Start: 2022-11-02 | End: 2022-11-02

## 2022-11-02 RX ORDER — HYDROMORPHONE HCL 110MG/55ML
0.5 PATIENT CONTROLLED ANALGESIA SYRINGE INTRAVENOUS EVERY 5 MIN PRN
Status: COMPLETED | OUTPATIENT
Start: 2022-11-02 | End: 2022-11-02

## 2022-11-02 RX ORDER — ONDANSETRON 2 MG/ML
INJECTION INTRAMUSCULAR; INTRAVENOUS PRN
Status: DISCONTINUED | OUTPATIENT
Start: 2022-11-02 | End: 2022-11-02 | Stop reason: SDUPTHER

## 2022-11-02 RX ADMIN — HYDROMORPHONE HYDROCHLORIDE 0.5 MG: 2 INJECTION, SOLUTION INTRAMUSCULAR; INTRAVENOUS; SUBCUTANEOUS at 11:53

## 2022-11-02 RX ADMIN — SODIUM CHLORIDE, POTASSIUM CHLORIDE, SODIUM LACTATE AND CALCIUM CHLORIDE: 600; 310; 30; 20 INJECTION, SOLUTION INTRAVENOUS at 08:25

## 2022-11-02 RX ADMIN — SODIUM CHLORIDE, POTASSIUM CHLORIDE, SODIUM LACTATE AND CALCIUM CHLORIDE: 600; 310; 30; 20 INJECTION, SOLUTION INTRAVENOUS at 10:35

## 2022-11-02 RX ADMIN — DEXAMETHASONE SODIUM PHOSPHATE 10 MG: 4 INJECTION, SOLUTION INTRAMUSCULAR; INTRAVENOUS at 09:20

## 2022-11-02 RX ADMIN — HYDROMORPHONE HYDROCHLORIDE 0.5 MG: 2 INJECTION, SOLUTION INTRAMUSCULAR; INTRAVENOUS; SUBCUTANEOUS at 11:37

## 2022-11-02 RX ADMIN — POTASSIUM CHLORIDE, DEXTROSE MONOHYDRATE AND SODIUM CHLORIDE: 150; 5; 450 INJECTION, SOLUTION INTRAVENOUS at 15:34

## 2022-11-02 RX ADMIN — PROPOFOL 200 MG: 10 INJECTION, EMULSION INTRAVENOUS at 09:14

## 2022-11-02 RX ADMIN — HYDROMORPHONE HYDROCHLORIDE 0.5 MG: 2 INJECTION, SOLUTION INTRAMUSCULAR; INTRAVENOUS; SUBCUTANEOUS at 12:35

## 2022-11-02 RX ADMIN — FENTANYL CITRATE 100 MCG: 50 INJECTION, SOLUTION INTRAMUSCULAR; INTRAVENOUS at 11:31

## 2022-11-02 RX ADMIN — SUGAMMADEX 200 MG: 100 INJECTION, SOLUTION INTRAVENOUS at 11:16

## 2022-11-02 RX ADMIN — ROCURONIUM BROMIDE 30 MG: 10 INJECTION, SOLUTION INTRAVENOUS at 09:52

## 2022-11-02 RX ADMIN — HYDROMORPHONE HYDROCHLORIDE 1 MG: 2 INJECTION, SOLUTION INTRAMUSCULAR; INTRAVENOUS; SUBCUTANEOUS at 17:30

## 2022-11-02 RX ADMIN — HYDROMORPHONE HYDROCHLORIDE 1 MG: 2 INJECTION, SOLUTION INTRAMUSCULAR; INTRAVENOUS; SUBCUTANEOUS at 15:29

## 2022-11-02 RX ADMIN — HYDROMORPHONE HYDROCHLORIDE 0.5 MG: 2 INJECTION, SOLUTION INTRAMUSCULAR; INTRAVENOUS; SUBCUTANEOUS at 11:45

## 2022-11-02 RX ADMIN — LIDOCAINE HYDROCHLORIDE 100 MG: 20 INJECTION, SOLUTION EPIDURAL; INFILTRATION; INTRACAUDAL; PERINEURAL at 09:14

## 2022-11-02 RX ADMIN — KETOROLAC TROMETHAMINE 30 MG: 30 INJECTION, SOLUTION INTRAMUSCULAR at 11:17

## 2022-11-02 RX ADMIN — ONDANSETRON 8 MG: 2 INJECTION INTRAMUSCULAR; INTRAVENOUS at 09:20

## 2022-11-02 RX ADMIN — HYDROMORPHONE HYDROCHLORIDE 1 MG: 2 INJECTION, SOLUTION INTRAMUSCULAR; INTRAVENOUS; SUBCUTANEOUS at 20:52

## 2022-11-02 RX ADMIN — FENTANYL CITRATE 100 MCG: 50 INJECTION, SOLUTION INTRAMUSCULAR; INTRAVENOUS at 09:07

## 2022-11-02 RX ADMIN — METOPROLOL TARTRATE 25 MG: 25 TABLET, FILM COATED ORAL at 19:19

## 2022-11-02 RX ADMIN — DEXMEDETOMIDINE HYDROCHLORIDE 20 MCG: 100 INJECTION, SOLUTION INTRAVENOUS at 09:03

## 2022-11-02 RX ADMIN — ROCURONIUM BROMIDE 20 MG: 10 INJECTION, SOLUTION INTRAVENOUS at 10:36

## 2022-11-02 RX ADMIN — Medication 3000 MG: at 09:00

## 2022-11-02 RX ADMIN — ROCURONIUM BROMIDE 50 MG: 10 INJECTION, SOLUTION INTRAVENOUS at 09:14

## 2022-11-02 ASSESSMENT — PAIN SCALES - GENERAL
PAINLEVEL_OUTOF10: 7
PAINLEVEL_OUTOF10: 8
PAINLEVEL_OUTOF10: 9
PAINLEVEL_OUTOF10: 8
PAINLEVEL_OUTOF10: 7
PAINLEVEL_OUTOF10: 8

## 2022-11-02 ASSESSMENT — LIFESTYLE VARIABLES: SMOKING_STATUS: 1

## 2022-11-02 ASSESSMENT — PAIN DESCRIPTION - DESCRIPTORS
DESCRIPTORS: SORE

## 2022-11-02 ASSESSMENT — PAIN DESCRIPTION - LOCATION
LOCATION: ABDOMEN

## 2022-11-02 ASSESSMENT — PAIN - FUNCTIONAL ASSESSMENT: PAIN_FUNCTIONAL_ASSESSMENT: 0-10

## 2022-11-02 ASSESSMENT — PAIN DESCRIPTION - PAIN TYPE: TYPE: SURGICAL PAIN

## 2022-11-02 NOTE — DISCHARGE INSTRUCTIONS
Postoperative Instructions   11/2/2022 Today you had a robot laparoscopic ventral hernia repair with mesh with Dr. Yaima Swartz at [facility] as an outpatient    Anticoagulation  - Take 10 mg Coumadin this evening 11/3/2022, resume regular dosage tomorrow Friday 11/4/2022  - Resume 120 mg Lovenox injections twice per day, starting this evening 11/3/2022, continue until INR check on Monday 11/7/2022    Contact information   Office number - [phone]   Office hours are 8 am - 5 pm  Monday - Friday   Contact the doctor on call during the evenings ( 5 pm - 8 am ) or on Weekends for urgent or emergent issues by using the main office number   ( [phone] ). Please hold routine questions until normal business hours. Wound care   The incisions are closed with dissolvable sutures which are beneath the skin. Surgical glue is then applied to the skin. The glue is purple in color and should be left undisturbed until your follow-up appointment. No bandages are required over the surgical glue. It is okay to shower but do not bath in a bathtub. Gently wash over the incisions with soap and water and then pat them dry with a towel. Contact the office for redness of the skin surrounding the incision or if there is drainage of pus. Wear your abdominal binder as much as you like to help with discomfort. Be sure to take intentionally deep breaths while wearing the binder, as it can make you feel like you should breathe shallowly. Activities   It is generally okay to go up and down stairs. Please be careful as your walking may be unsteady from the surgery or pain medications. Driving: Do not drive while on narcotic pain medications or while still under the effect of narcotic pain medications. Make sure that you are moving comfortably and not limited by postoperative pain or weakness that would make it difficult to react in an emergency situation. Exercise:  The main purpose of the activity restrictions is to reduce the risk of developing a hernia at an incision site. Do not lift greater than 25 lbs                     Avoid strenuous abdominal exercises- sit ups, core workouts                     Light cardiovascular exercise is generally okay                     Ask your doctor about the length of the exercise restrictions     Follow up   Please call the office for any concerns between the time of surgery and your follow up appointment. We prefer to have you call to schedule your follow up appointment as it will allow for some flexibility. Please make the appointment for about 2 weeks from the date of surgery. Return to work     Return to work is discussed with the doctor on an individual case basis. If you need documentation for return to work or FMLA paperwork, please contact the office at [phone]. SEDATION DISCHARGE INSTRUCTIONS   11/2/2022      Wear your seatbelt home. You are under the influence of drugs. Do not drink alcohol, drive, operate machinery, or make any important decisions or sign any legal documents for 24 hours  A responsible adult needs to be with you for 24 hours. You may experience lightheadedness, dizziness, nausea, heightened emotions and/or sleepiness following surgery. Rest at home today- increase activity as tolerated. Progress slowly to a regular diet and drink plenty of fluids unless your physician has instructed you otherwise. If nausea becomes a problem, call your physician. Coughing, sore throat, and muscle aches are other side effects of anesthesia and should improve with time. Do not drive or operate machinery while taking narcotics.            acetaminophen and hydrocodone  Pronunciation:  a SEET a MIN oh fen and savannah droe KOE done  Brand:  Hycet, Lorcet, Norco, Verdrocet, Vicodin, Xodol, Zamicet  What is the most important information I should know about acetaminophen and hydrocodone? MISUSE OF OPIOID MEDICINE CAN CAUSE ADDICTION, OVERDOSE, OR DEATH. Keep the medication in a place where others cannot get to it. Taking opioid medicine during pregnancy may cause life-threatening withdrawal symptoms in the . Fatal side effects can occur if you use opioid medicine with alcohol, or with other drugs that cause drowsiness or slow your breathing. Stop taking this medicine and call your doctor right away if you have skin redness or a rash that spreads and causes blistering and peeling. What is acetaminophen and hydrocodone? Acetaminophen and hydrocodone is a combination medicine used to relieve moderate to severe pain. Acetaminophen and hydrocodone contains an opioid medicine, and may be habit-forming. Acetaminophen and hydrocodone may also be used for purposes not listed in this medication guide. What should I discuss with my healthcare provider before taking acetaminophen and hydrocodone? You should not use this medicine if you are allergic to acetaminophen or hydrocodone, or if you have:  severe asthma or breathing problems; or  a blockage in your stomach or intestines. Tell your doctor if you have ever had:  breathing problems, sleep apnea (breathing stops during sleep);  liver disease;  a drug or alcohol addiction;  kidney disease;  a head injury or seizures;  urination problems; or  problems with your thyroid, pancreas, or gallbladder. If you use opioid medicine while you are pregnant, your baby could become dependent on the drug. This can cause life-threatening withdrawal symptoms in the baby after it is born. Babies born dependent on opioids may need medical treatment for several weeks. Ask a doctor before using opioid medicine if you are breastfeeding. Tell your doctor if you notice severe drowsiness or slow breathing in the nursing baby. How should I take acetaminophen and hydrocodone? Follow all directions on your prescription label.  Never take this medicine in larger amounts, or for longer than prescribed. An overdose can damage your liver or cause death. Tell your doctor if you feel an increased urge to use more of this medicine. Never share this medicine with another person, especially someone with a history of drug abuse or addiction. MISUSE CAN CAUSE ADDICTION, OVERDOSE, OR DEATH. Keep the medicine in a place where others cannot get to it. Selling or giving away this medicine is against the law. Measure liquid medicine carefully. Use the dosing syringe provided, or use a medicine dose-measuring device (not a kitchen spoon). If you need surgery or medical tests, tell the doctor ahead of time that you are using this medicine. You should not stop using this medicine suddenly. Follow your doctor's instructions about tapering your dose. Store at room temperature away from moisture and heat. Keep track of your medicine. You should be aware if anyone is using it improperly or without a prescription. Do not keep leftover opioid medication. Just one dose can cause death in someone using this medicine accidentally or improperly. Ask your pharmacist where to locate a drug take-back disposal program. If there is no take-back program, flush the unused medicine down the toilet. What happens if I miss a dose? Since this medicine is used for pain, you are not likely to miss a dose. Skip any missed dose if it is almost time for your next dose. Do not use two doses at one time. What happens if I overdose? Seek emergency medical attention or call the Poison Help line at 1-373.541.7253. An overdose of this medicine can be fatal, especially in a child or other person using the medicine without a prescription. Overdose symptoms may include nausea, vomiting, sweating, severe drowsiness, pinpoint pupils, slow breathing, or no breathing. Your doctor may recommend you get naloxone (a medicine to reverse an opioid overdose) and keep it with you at all times.  A person caring for you can give the naloxone if you stop breathing or don't wake up. Your caregiver must still get emergency medical help and may need to perform CPR (cardiopulmonary resuscitation) on you while waiting for help to arrive. Anyone can buy naloxone from a pharmacy or local health department. Make sure any person caring for you knows where you keep naloxone and how to use it. What should I avoid while taking acetaminophen and hydrocodone? Avoid driving or operating machinery until you know how this medicine will affect you. Dizziness or drowsiness can cause falls, accidents, or severe injuries. Do not drink alcohol. Dangerous side effects or death could occur. Ask a doctor or pharmacist before using any other medicine that may contain acetaminophen (sometimes abbreviated as APAP). Taking certain medications together can lead to a fatal overdose. What are the possible side effects of acetaminophen and hydrocodone? Get emergency medical help if you have signs of an allergic reaction: hives; difficulty breathing; swelling of your face, lips, tongue, or throat. Opioid medicine can slow or stop your breathing, and death may occur. A person caring for you should give naloxone and/or seek emergency medical attention if you have slow breathing with long pauses, blue colored lips, or if you are hard to wake up. In rare cases, acetaminophen may cause a severe skin reaction that can be fatal. This could occur even if you have taken acetaminophen in the past and had no reaction. Stop taking this medicine and call your doctor right away if you have skin redness or a rash that spreads and causes blistering and peeling.    Call your doctor at once if you have:  noisy breathing, sighing, shallow breathing, breathing that stops;  a light-headed feeling, like you might pass out;  liver problems --nausea, upper stomach pain, tiredness, loss of appetite, dark urine, pennie-colored stools, jaundice (yellowing of the skin or eyes);  low cortisol levels -- nausea, vomiting, loss of appetite, dizziness, worsening tiredness or weakness; o  high levels of serotonin in the body --agitation, hallucinations, fever, sweating, shivering, fast heart rate, muscle stiffness, twitching, loss of coordination, nausea, vomiting, diarrhea. Serious breathing problems may be more likely in older adults and in those who are debilitated or have wasting syndrome or chronic breathing disorders. Common side effects include:  dizziness, drowsiness, feeling tired;  nausea, vomiting, stomach pain;  constipation; or  headache. This is not a complete list of side effects and others may occur. Call your doctor for medical advice about side effects. You may report side effects to FDA at 5-090-CKD-7500. What other drugs will affect acetaminophen and hydrocodone? You may have breathing problems or withdrawal symptoms if you start or stop taking certain other medicines. Tell your doctor if you also use an antibiotic, antifungal medication, heart or blood pressure medication, seizure medication, or medicine to treat HIV or hepatitis C. Opioid medication can interact with many other drugs and cause dangerous side effects or death. Be sure your doctor knows if you also use:  cold or allergy medicines, bronchodilator asthma/COPD medication, or a diuretic (\"water pill\");  medicines for motion sickness, irritable bowel syndrome, or overactive bladder;  other opioids --opioid pain medicine or prescription cough medicine;  a sedative like Valium --diazepam, alprazolam, lorazepam, Xanax, Klonopin, Versed, and others;  drugs that make you sleepy or slow your breathing --a sleeping pill, muscle relaxer, medicine to treat mood disorders or mental illness;  drugs that affect serotonin levels in your body --a stimulant, or medicine for depression, Parkinson's disease, migraine headaches, serious infections, or nausea and vomiting. This list is not complete.  Other drugs may affect acetaminophen and hydrocodone, including prescription and over-the-counter medicines, vitamins, and herbal products. Not all possible interactions are listed here. Where can I get more information? Your doctor or pharmacist can provide more information about acetaminophen and hydrocodone. Remember, keep this and all other medicines out of the reach of children, never share your medicines with others, and use this medication only for the indication prescribed. Every effort has been made to ensure that the information provided by [address] is accurate, up-to-date, and complete, but no guarantee is made to that effect. Drug information contained herein may be time sensitive. [facility] information has been compiled for use by healthcare practitioners and consumers in the [place] and therefore [facility] does not warrant that uses outside of the [place] are appropriate, unless specifically indicated otherwise. [facility]'s drug information does not endorse drugs, diagnose patients or recommend therapy. [facility] drug information is an informational resource designed to assist licensed healthcare practitioners in caring for their patients and/or to serve consumers viewing this service as a supplement to, and not a substitute for, the expertise, skill, knowledge and judgment of healthcare practitioners. The absence of a warning for a given drug or drug combination in no way should be construed to indicate that the drug or drug combination is safe, effective or appropriate for any given patient. [facility] does not assume any responsibility for any aspect of healthcare administered with the aid of information [facility] provides. The information contained herein is not intended to cover all possible uses, directions, precautions, warnings, drug interactions, allergic reactions, or adverse effects.  If you have questions about the drugs you are taking, check with your doctor, nurse or pharmacist.  Copyright 9316-1843 Radha Etienne. Version: 16.03. Revision date: 2/2/2021. Care instructions adapted under license by [facility] ([facility]). If you have questions about a medical condition or this instruction, always ask your healthcare professional. [address] any warranty or liability for your use of this information.

## 2022-11-02 NOTE — PROGRESS NOTES
Pt arrived to PACU from OR in stable condition and is alert. Pt can move extremities to command. Respirations are even on 6L O2 per Sm. Skin warm, dry, and with appropriate for ethnicity color. Abd is round and soft. Pain is tolerable at this time.   4 laparoscopic abdominal surgical site(s) intact with dressing= Dermabond surgical glue, well approximated, open to air      S/P: robot assisted laparoscopic ventral hernia repair with mesh with Dr. Lea Huang at [facility].      [address], RN  Pre-Op/Recovery   Same Day Surgery

## 2022-11-02 NOTE — BRIEF OP NOTE
Brief Postoperative Note      Patient: Sukh Carnes III  YOB: 1970  MRN: [id number]    Date of Procedure: 11/2/2022    Pre-Op Diagnosis: Ventral hernia without obstruction or gangrene [K43.9]    Post-Op Diagnosis: Same       Procedure(s):  ROBOT ASSISTED LAPAROSCOPIC VENTRAL HERNIA REPAIR WITH MESH    Surgeon(s):  Perry Guerrero MD    Assistant:  Surgical Assistant: Nuvia De Leon Assistant: Nir Love    Anesthesia: General    Estimated Blood Loss (mL): Minimal    Complications: None    Specimens:   * No specimens in log *    Implants:  Implant Name Type Inv. Item Serial No.  Lot No. LRB No. Used Action   MESH SURG DIA4. Dartha Ream CIR SEPRA Sisto Salma - [id number]  MESH SURG DIA4. 5IN CIR [employer]PR[employer] VENTRALIGHT  BARD LORA-WD [id number] N/A 1 Implanted         Drains: * No LDAs found *    Findings: ventral hernia repair with mesh    Electronically signed by Perry Guerrero MD on 11/2/2022 at 11:50 AM

## 2022-11-02 NOTE — ANESTHESIA PRE PROCEDURE
Department of Anesthesiology  Preprocedure Note       Name:  Jessica Simpson III   Age:  45 y.o.  :  1970                                          MRN:  [id number]         Date:  2022      Surgeon: Fidencio Alexandra):  Daphne Thompson MD    Procedure: Procedure(s):  ROBOT ASSISTED LAPAROSCOPIC VENTRAL HERNIA REPAIR WITH MESH    Medications prior to admission:   Prior to Admission medications    Medication Sig Start Date End Date Taking? Authorizing Provider   enoxaparin (LOVENOX) 120 MG/0.8ML injection INJECT 1 SYRINGE SUBCUTANEOUSLY TWICE A DAY AS DIRECTED 10/17/22  Yes Historical Provider, MD   metoprolol tartrate (LOPRESSOR) 25 MG tablet TAKE 1 TABLET BY MOUTH TWICE A DAY 22   [facility] , DO   atorvastatin (LIPITOR) 40 MG tablet TAKE 1 TABLET BY MOUTH EVERY DAY 22   [facility], DO   lisinopril (PRINIVIL;ZESTRIL) 10 MG tablet TAKE 1 TABLET BY MOUTH IN THE MORNING AND IN THE EVENING 22   [facility] , DO   ALLOPURINOL PO Take 300 mg by mouth daily    Historical Provider, MD   aspirin 81 MG EC tablet TAKE 1 TABLET BY MOUTH EVERY DAY 22   [facility], DO   warfarin (COUMADIN) 5 MG tablet TAKE 2 TABLETS BY MOUTH TWICE A WEEK AND 1 TABLET FIVE TIMES WEEKLY. (TAKE 2 TABLETS ON MON AND FRI AND 1 TABLET ALL OTHER DAYS OF THE WEEK.) 3/17/22 8/23/22  [facility] , DO   amoxicillin (AMOXIL) 500 MG capsule Take 1 capsule by mouth See Admin Instructions for 4 doses Take 2 gm one hour prior any dental procedure  Patient not taking: Reported on 2022   [facility] , DO   predniSONE (DELTASONE) 10 MG tablet Take 10-40 mg by mouth as needed Seasonal (Summer only)  Patient not taking: Reported on 2022   Historical Provider, MD       Current medications:    No current facility-administered medications for this encounter.      Facility-Administered Medications Ordered in Other Encounters   Medication Dose Route Frequency Provider Last Rate Last Admin    mupirocin (BACTROBAN) 2 % nasal ointment   Each Nostril Once Yuli Aragon MD        aminocaproic acid (AMICAR) 5,000 mg in sodium chloride 0.9 % 250 mL IV bolus  5 g IntraVENous Once Yuli Aragon MD        insulin regular (NOVOLIN R) 100 Units in sodium chloride 0.9 % 100 mL infusion  0.5 Units/hr IntraVENous Continuous Yuli Aragon MD        lactated ringers infusion   IntraVENous Continuous Yuli Aragon MD        pantoprazole (PROTONIX) injection 40 mg  40 mg IntraVENous Once Yuli Aragon MD        ceFAZolin (ANCEF) 2 g in dextrose 3% 50mL IVPB (duplex)  2 g IntraVENous Once Yuli Aragon MD        vancomycin (VANCOCIN) 1,500 mg in dextrose 5 % 250 mL IVPB  1,500 mg IntraVENous Once Yuli Aragon MD           Allergies:  No Known Allergies    Problem List:    Patient Active Problem List   Diagnosis Code    Umbilical hernia F41.8    Ventral hernia without obstruction or gangrene K43.9    Aortic stenosis I35.0    Essential hypertension I10    Mixed hyperlipidemia E78.2    Tobacco abuse Z72.0    PVD (peripheral vascular disease) ([facility] [address] I73.9    S/P AVR (aortic valve replacement) Z95.2    Long term (current) use of anticoagulants Z79.01    Dilated aortic root (HCC) I77.810    Bicuspid aortic valve Q23.1    Family history of aneurysm Z82.49       Past Medical History:        Diagnosis Date    Aortic stenosis     mild as per pt 7-23-14    Colon polyp 06/01/2014    GERD (gastroesophageal reflux disease)     Headache(784.0)     Hernia 01/01/2014    Hyperlipidemia     Hypertension     Neuropathy     hand and feet       Past Surgical History:        Procedure Laterality Date    AORTIC VALVE REPLACEMENT  08/08/2014    Dr. Regina Epps - 22-mm ATS  mechanical valve    APPENDECTOMY      CARDIAC SURGERY      COLONOSCOPY         Social History:    Social History     Tobacco Use    Smoking status: Some Days     Packs/day: 0.25     Years: 20.00     Pack years: 5.00     Types: Cigarettes    Smokeless tobacco: Never   Substance Use Topics    Alcohol use: Yes     Alcohol/week: 3.0 standard drinks     Types: 3 Cans of beer per week     Comment: 2-4 drinks per day, liquor 4 days a week                                Ready to quit: Not Answered  Counseling given: Not Answered      Vital Signs (Current):   Vitals:    11/01/22 1001   Weight: 270 lb (122.5 kg)   Height: 5' 11\" (1.803 m)                                              BP Readings from Last 3 Encounters:   11/01/22 126/80   08/23/22 118/74   06/22/22 120/82       NPO Status:                                                                                 BMI:   Wt Readings from Last 3 Encounters:   11/01/22 270 lb (122.5 kg)   11/01/22 276 lb (125.2 kg)   08/23/22 271 lb (122.9 kg)     Body mass index is 37.66 kg/m². CBC:   Lab Results   Component Value Date/Time    WBC 6.1 11/01/2022 09:02 AM    RBC 4.25 11/01/2022 09:02 AM    HGB 13.9 11/01/2022 09:02 AM    HCT 40.7 11/01/2022 09:02 AM    MCV 95.8 11/01/2022 09:02 AM    RDW 14.3 11/01/2022 09:02 AM     11/01/2022 09:02 AM       CMP:   Lab Results   Component Value Date/Time     09/04/2020 08:58 AM    K 4.7 09/04/2020 08:58 AM     09/04/2020 08:58 AM    CO2 25 09/04/2020 08:58 AM    BUN 11 09/04/2020 08:58 AM    CREATININE 0.8 09/04/2020 08:58 AM    GFRAA >60 09/04/2020 08:58 AM    GFRAA >60 10/04/2012 03:18 PM    AGRATIO 1.4 07/14/2017 11:45 AM    LABGLOM >60 09/04/2020 08:58 AM    GLUCOSE 112 09/04/2020 08:58 AM    PROT 6.8 07/14/2017 11:45 AM    CALCIUM 9.3 09/04/2020 08:58 AM    BILITOT <0.2 07/14/2017 11:45 AM    ALKPHOS 68 07/14/2017 11:45 AM    AST 27 09/04/2020 08:58 AM    ALT 21 09/04/2020 08:58 AM       POC Tests: No results for input(s): POCGLU, POCNA, POCK, POCCL, POCBUN, POCHEMO, POCHCT in the last 72 hours.     Coags:   Lab Results   Component Value Date/Time    PROTIME 38.6 04/30/2020 08:11 AM    INR 1.1 11/01/2022 08:31 AM    INR 3.29 04/30/2020 08:11 AM APTT 32.1 08/04/2014 07:44 AM       HCG (If Applicable): No results found for: PREGTESTUR, PREGSERUM, HCG, HCGQUANT     ABGs:   Lab Results   Component Value Date/Time    PHART 7.349 08/08/2014 12:39 PM    PO2ART 86.6 08/08/2014 12:39 PM    WFE9RDT 40.3 08/08/2014 12:39 PM    AOU3EXG 21.7 08/08/2014 12:39 PM    BEART -3.6 08/08/2014 12:39 PM    Z5VGYFDX 96.2 08/08/2014 12:39 PM        Type & Screen (If Applicable):  No results found for: LABABO, LABRH    Drug/Infectious Status (If Applicable):  No results found for: HIV, HEPCAB    COVID-19 Screening (If Applicable): No results found for: COVID19        Anesthesia Evaluation  Patient summary reviewed and Nursing notes reviewed no history of anesthetic complications:   Airway: Mallampati: III  TM distance: >3 FB   Neck ROM: full  Mouth opening: > = 3 FB   Dental: normal exam         Pulmonary:normal exam  breath sounds clear to auscultation  (+) current smoker    (-) COPD, asthma and sleep apnea                           Cardiovascular:    (+) hypertension:, valvular problems/murmurs ([facility] AVR, echo 2021 EF 55-60 no rwma mild mr [facility] avr):, murmur, hyperlipidemia    (-) past MI, CAD (normal coronaries in 2014), CABG/stent, dysrhythmias,  angina and  CHF    ECG reviewed  Rhythm: regular  Rate: normal  Echocardiogram reviewed                  Neuro/Psych:   Negative Neuro/Psych ROS     (-) seizures, TIA and CVA           GI/Hepatic/Renal:   (+) GERD: well controlled,      (-) liver disease and no renal disease       Endo/Other:    (+) : arthritis (Gout):., .    (-) diabetes mellitus, hypothyroidism, hyperthyroidism               Abdominal:   (+) obese,           Vascular: Other Findings:           Anesthesia Plan      general     ASA 3       Induction: intravenous. MIPS: Postoperative opioids intended and Prophylactic antiemetics administered. Anesthetic plan and risks discussed with patient. Plan discussed with CRNA.                     Darren Rizo Cecily Chakraborty MD   11/2/2022

## 2022-11-02 NOTE — PROGRESS NOTES
Pt meets d/c criteria for phase 1 in PACU and has been seen by anesthesia. Ok to transfer to [facility] when a room becomes available. Will alert anyone in waiting room for them and the nursing unit if applicable. Will continue to monitor for safety and comfort.     Daxa MERAZN, RN  Pre-Op/Recovery   Same Day Surgery

## 2022-11-02 NOTE — H&P
Iliana Parra Friend III     HPI: 45year old male here for ventral hernia repair    Past Medical History:   Diagnosis Date    Aortic stenosis     mild as per pt 7-23-14    Colon polyp 06/01/2014    GERD (gastroesophageal reflux disease)     Headache(784.0)     Hernia 01/01/2014    Hyperlipidemia     Hypertension     Neuropathy     hand and feet       Past Surgical History:   Procedure Laterality Date    AORTIC VALVE REPLACEMENT  08/08/2014    Dr. Mari Wheeler - 22-mm ATS  mechanical valve    APPENDECTOMY      CARDIAC SURGERY      COLONOSCOPY         Social History     Socioeconomic History    Marital status:      Spouse name: Not on file    Number of children: Not on file    Years of education: Not on file    Highest education level: Not on file   Occupational History    Not on file   Tobacco Use    Smoking status: Some Days     Packs/day: 0.25     Years: 20.00     Pack years: 5.00     Types: Cigarettes    Smokeless tobacco: Never   Vaping Use    Vaping Use: Never used   Substance and Sexual Activity    Alcohol use: Yes     Alcohol/week: 3.0 standard drinks     Types: 3 Cans of beer per week     Comment: 2-4 drinks per day, liquor 4 days a week    Drug use: Yes     Types: Marijuana Ra Passy)     Comment: gummys, 3 times per month    Sexual activity: Yes     Partners: Female   Other Topics Concern    Not on file   Social History Narrative    Not on file     Social Determinants of Health     Financial Resource Strain: Not on file   Food Insecurity: Not on file   Transportation Needs: Not on file   Physical Activity: Not on file   Stress: Not on file   Social Connections: Not on file   Intimate Partner Violence: Not on file   Housing Stability: Not on file       Allergies: No Known Allergies    Prior to Admission medications    Medication Sig Start Date End Date Taking?  Authorizing Provider   enoxaparin (LOVENOX) 120 MG/0.8ML injection INJECT 1 SYRINGE SUBCUTANEOUSLY TWICE A DAY AS DIRECTED 10/17/22  Yes Historical Provider, MD   metoprolol tartrate (LOPRESSOR) 25 MG tablet TAKE 1 TABLET BY MOUTH TWICE A DAY 9/23/22   Buster Penny DO   atorvastatin (LIPITOR) 40 MG tablet TAKE 1 TABLET BY MOUTH EVERY DAY 9/23/22   Buster Penny DO   lisinopril (PRINIVIL;ZESTRIL) 10 MG tablet TAKE 1 TABLET BY MOUTH IN THE MORNING AND IN THE EVENING 8/24/22   Buster Penny DO   ALLOPURINOL PO Take 300 mg by mouth daily    Historical Provider, MD   aspirin 81 MG EC tablet TAKE 1 TABLET BY MOUTH EVERY DAY 4/18/22   Buster Penny DO   warfarin (COUMADIN) 5 MG tablet TAKE 2 TABLETS BY MOUTH TWICE A WEEK AND 1 TABLET FIVE TIMES WEEKLY. (TAKE 2 TABLETS ON MON AND FRI AND 1 TABLET ALL OTHER DAYS OF THE WEEK.) 3/17/22 8/23/22  Buster Penny DO   amoxicillin (AMOXIL) 500 MG capsule Take 1 capsule by mouth See Admin Instructions for 4 doses Take 2 gm one hour prior any dental procedure  Patient not taking: Reported on 11/1/2022 9/30/21   Buster Penny DO   predniSONE (DELTASONE) 10 MG tablet Take 10-40 mg by mouth as needed Seasonal (Summer only)  Patient not taking: Reported on 11/1/2022 5/22/19   Historical Provider, MD       Active Problems:    * No active hospital problems. *  Resolved Problems:    * No resolved hospital problems. *      Blood pressure (!) 154/89, pulse 87, temperature 96.8 °F (36 °C), temperature source Temporal, resp. rate 16, height 5' 11\" (1.803 m), weight 276 lb 6.4 oz (125.4 kg), SpO2 100 %. Review of Systems    Physical Exam  Cardiovascular:      Rate and Rhythm: Normal rate and regular rhythm. Pulmonary:      Effort: Pulmonary effort is normal.      Breath sounds: Normal breath sounds.        Assessment:  Ventral hernia    Plan:  Ventral hernia repair    Susen Oppenheim, MD  11/2/2022

## 2022-11-02 NOTE — ANESTHESIA POSTPROCEDURE EVALUATION
Department of Anesthesiology  Postprocedure Note    Patient: Frederick Treviño  MRN: [id number]  YOB: 1970  Date of evaluation: 11/2/2022      Procedure Summary     Date: 11/02/22 Room / Location: [facility] OR [address] [place] [address]    Anesthesia Start: 8686 Anesthesia Stop: 1138    Procedure: [address] (Abdomen) Diagnosis:       Ventral hernia without obstruction or gangrene      (Ventral hernia without obstruction or gangrene [K43.9])    Surgeons: Alek Perez MD Responsible Provider: Regina Macias MD    Anesthesia Type: General ASA Status: 3          Anesthesia Type: General    Richard Phase I: Richard Score: 10    Richard Phase II:        Anesthesia Post Evaluation    Patient location during evaluation: PACU  Patient participation: complete - patient participated  Level of consciousness: awake and alert  Airway patency: patent  Nausea & Vomiting: no vomiting and no nausea  Complications: no  Cardiovascular status: hemodynamically stable  Respiratory status: acceptable  Hydration status: stable

## 2022-11-03 VITALS
OXYGEN SATURATION: 92 % | SYSTOLIC BLOOD PRESSURE: 135 MMHG | BODY MASS INDEX: 38.69 KG/M2 | DIASTOLIC BLOOD PRESSURE: 73 MMHG | RESPIRATION RATE: 16 BRPM | TEMPERATURE: 98.1 F | WEIGHT: 276.4 LBS | HEART RATE: 82 BPM | HEIGHT: 71 IN

## 2022-11-03 LAB
BASOPHILS ABSOLUTE: 0 K/UL (ref 0–0.2)
BASOPHILS RELATIVE PERCENT: 0.3 %
EOSINOPHILS ABSOLUTE: 0 K/UL (ref 0–0.6)
EOSINOPHILS RELATIVE PERCENT: 0 %
HCT VFR BLD CALC: 36.3 % (ref 40.5–52.5)
HEMOGLOBIN: 12.3 G/DL (ref 13.5–17.5)
LYMPHOCYTES ABSOLUTE: 0.7 K/UL (ref 1–5.1)
LYMPHOCYTES RELATIVE PERCENT: 6.7 %
MCH RBC QN AUTO: 33 PG (ref 26–34)
MCHC RBC AUTO-ENTMCNC: 33.9 G/DL (ref 31–36)
MCV RBC AUTO: 97.3 FL (ref 80–100)
MONOCYTES ABSOLUTE: 0.6 K/UL (ref 0–1.3)
MONOCYTES RELATIVE PERCENT: 5.9 %
NEUTROPHILS ABSOLUTE: 9 K/UL (ref 1.7–7.7)
NEUTROPHILS RELATIVE PERCENT: 87.1 %
PDW BLD-RTO: 14.5 % (ref 12.4–15.4)
PLATELET # BLD: 192 K/UL (ref 135–450)
PMV BLD AUTO: 8.3 FL (ref 5–10.5)
RBC # BLD: 3.73 M/UL (ref 4.2–5.9)
WBC # BLD: 10.4 K/UL (ref 4–11)

## 2022-11-03 PROCEDURE — 6370000000 HC RX 637 (ALT 250 FOR IP): Performed by: SURGERY

## 2022-11-03 PROCEDURE — 49653 PR LAP, VENTRAL HERNIA REPAIR,INCARCERATED: CPT | Performed by: SURGERY

## 2022-11-03 PROCEDURE — APPSS30 APP SPLIT SHARED TIME 16-30 MINUTES: Performed by: NURSE PRACTITIONER

## 2022-11-03 PROCEDURE — APPNB30 APP NON BILLABLE TIME 0-30 MINS: Performed by: NURSE PRACTITIONER

## 2022-11-03 PROCEDURE — 85025 COMPLETE CBC W/AUTO DIFF WBC: CPT

## 2022-11-03 PROCEDURE — 6360000002 HC RX W HCPCS: Performed by: SURGERY

## 2022-11-03 PROCEDURE — 6370000000 HC RX 637 (ALT 250 FOR IP): Performed by: NURSE PRACTITIONER

## 2022-11-03 PROCEDURE — 36415 COLL VENOUS BLD VENIPUNCTURE: CPT

## 2022-11-03 PROCEDURE — 2500000003 HC RX 250 WO HCPCS: Performed by: SURGERY

## 2022-11-03 PROCEDURE — 2580000003 HC RX 258: Performed by: SURGERY

## 2022-11-03 PROCEDURE — 94150 VITAL CAPACITY TEST: CPT

## 2022-11-03 RX ORDER — HYDROCODONE BITARTRATE AND ACETAMINOPHEN 5; 325 MG/1; MG/1
2 TABLET ORAL EVERY 4 HOURS PRN
Status: DISCONTINUED | OUTPATIENT
Start: 2022-11-03 | End: 2022-11-03 | Stop reason: HOSPADM

## 2022-11-03 RX ORDER — HYDROCODONE BITARTRATE AND ACETAMINOPHEN 5; 325 MG/1; MG/1
1 TABLET ORAL EVERY 6 HOURS PRN
Qty: 18 TABLET | Refills: 0 | Status: SHIPPED | OUTPATIENT
Start: 2022-11-03 | End: 2022-11-10

## 2022-11-03 RX ORDER — HYDROCODONE BITARTRATE AND ACETAMINOPHEN 5; 325 MG/1; MG/1
1 TABLET ORAL EVERY 4 HOURS PRN
Status: DISCONTINUED | OUTPATIENT
Start: 2022-11-03 | End: 2022-11-03 | Stop reason: HOSPADM

## 2022-11-03 RX ADMIN — POTASSIUM CHLORIDE, DEXTROSE MONOHYDRATE AND SODIUM CHLORIDE: 150; 5; 450 INJECTION, SOLUTION INTRAVENOUS at 00:34

## 2022-11-03 RX ADMIN — METOPROLOL TARTRATE 25 MG: 25 TABLET, FILM COATED ORAL at 08:43

## 2022-11-03 RX ADMIN — HYDROMORPHONE HYDROCHLORIDE 1 MG: 2 INJECTION, SOLUTION INTRAMUSCULAR; INTRAVENOUS; SUBCUTANEOUS at 08:44

## 2022-11-03 RX ADMIN — HYDROMORPHONE HYDROCHLORIDE 1 MG: 2 INJECTION, SOLUTION INTRAMUSCULAR; INTRAVENOUS; SUBCUTANEOUS at 00:28

## 2022-11-03 RX ADMIN — HYDROCODONE BITARTRATE AND ACETAMINOPHEN 2 TABLET: 5; 325 TABLET ORAL at 11:24

## 2022-11-03 RX ADMIN — SODIUM CHLORIDE, PRESERVATIVE FREE 10 ML: 5 INJECTION INTRAVENOUS at 00:29

## 2022-11-03 RX ADMIN — ASPIRIN 81 MG: 81 TABLET, COATED ORAL at 08:43

## 2022-11-03 RX ADMIN — ENOXAPARIN SODIUM 40 MG: 40 INJECTION SUBCUTANEOUS at 08:44

## 2022-11-03 RX ADMIN — ALLOPURINOL 300 MG: 300 TABLET ORAL at 08:43

## 2022-11-03 ASSESSMENT — PAIN SCALES - GENERAL
PAINLEVEL_OUTOF10: 6
PAINLEVEL_OUTOF10: 5
PAINLEVEL_OUTOF10: 6

## 2022-11-03 ASSESSMENT — PAIN DESCRIPTION - DESCRIPTORS
DESCRIPTORS: ACHING;DISCOMFORT
DESCRIPTORS: SORE

## 2022-11-03 ASSESSMENT — PAIN DESCRIPTION - ORIENTATION
ORIENTATION: RIGHT;LEFT;MID
ORIENTATION: RIGHT;LEFT;MID

## 2022-11-03 ASSESSMENT — PAIN DESCRIPTION - PAIN TYPE
TYPE: SURGICAL PAIN
TYPE: SURGICAL PAIN

## 2022-11-03 ASSESSMENT — PAIN DESCRIPTION - LOCATION
LOCATION: ABDOMEN

## 2022-11-03 NOTE — CARE COORDINATION
Discharge Planning Note:    Chart reviewed and it appears that patient has minimal needs for discharge at this time. Discussed with patients nurse and requested that case management be notified if discharge needs are identified.     - Current discharge plan is for the patient to return home. Case management will continue to follow progress and update discharge plan as needed. Risk of Readmission Score:  Outpatient in a bed    ROD Ritchie RN    [facility]  Phone: [phone]

## 2022-11-03 NOTE — PROGRESS NOTES
IV removed, pt given d/c instructions, education, 1 prescription which was filled in our outpatient pharmacy and pt taken to main lobby in wheelchair to be taken home by private vehicle.

## 2022-11-03 NOTE — PROGRESS NOTES
Incentive Spirometry education and demonstration completed by Respiratory Therapy Yes      Response to education: Very Good     Teaching Time: 5 minutes    Minimum Predicted Vital Capacity - 753 mL. Patient's Actual Vital Capacity - 1500 mL. Turning over to Nursing for routine follow-up Yes.     Electronically signed by Jose Gill RCP on 11/3/2022 at 8:35 AM

## 2022-11-03 NOTE — PROGRESS NOTES
CLINICAL PHARMACY NOTE: MEDS TO BEDS    Total # of Prescriptions Filled: 1   The following medications were delivered to the patient:  Norco 5-325 mg    Additional Documentation:  Delivered to patient=signed  Ok to be delivered per [facility] Zoraida Fontana

## 2022-11-03 NOTE — DISCHARGE SUMMARY
[place] [address] and Laparoscopic Surgery        Discharge Summary    Patient Name: Carly Garcia  MRN: [id number]  YOB: 1970  PCP: Tyson Do MD  Admission Date: 11/2/2022  Discharge Date: 11/3/2022  Disposition: home  Admitting Diagnosis: Ventral hernia without obstruction or gangrene [K43.9]  Discharge Diagnosis:   Patient Active Problem List   Diagnosis    Umbilical hernia    Ventral hernia without obstruction or gangrene    Aortic stenosis    Essential hypertension    Mixed hyperlipidemia    Tobacco abuse    PVD (peripheral vascular disease) (HCC)    S/P AVR (aortic valve replacement)    Long term (current) use of anticoagulants    Dilated aortic root (HCC)    Bicuspid aortic valve    Family history of aneurysm      Consultants: None    Procedures/Diagnostic Test(s):  No orders to display       Discharge Condition: good    HOSPITAL COURSE: Lissa Chatterjee originally presented to the hospital on 11/2/2022  7:19 AM for robot-assisted laparoscopic ventral hernia repair with mesh. Hospital course was uneventful. At time of discharge, Lissa Chatterjee was tolerating a regular diet, had active bowel sounds, ambulating on his own accord and had adequate analgesia on oral pain medications, and had no signs of symptoms of complications. PHYSICAL EXAMINATION:  Discharge Vitals:  height is 5' 11\" (1.803 m) and weight is 276 lb 6.4 oz (125.4 kg). His oral temperature is 98.1 °F (36.7 °C). His blood pressure is 135/73 and his pulse is 82. His respiration is 16 and oxygen saturation is 92%.    General appearance - alert, well appearing, and in no distress  Chest - clear to ausculation  Heart - normal rate and regular rhythm  Abdomen - soft, non-distended, incisional tenderness only, bowel sounds present  Neurological - motor and sensory grossly normal bilaterally  Musculoskeletal - full range of motion without pain  Extremities - peripheral pulses normal, no pedal edema, no clubbing or cyanosis  Incision: healing well, no drainage, no erythema, ecchymosis noted, well approximated    LABS:  Recent Labs     11/01/22  0902 11/03/22  0456   WBC 6.1 10.4   HGB 13.9 12.3*   HCT 40.7 36.3*    192       DISCHARGE INSTRUCTIONS:  1. Discharge medications:      Medication List        START taking these medications      HYDROcodone-acetaminophen 5-325 MG per tablet  Commonly known as: Norco  Take 1 tablet by mouth every 6 hours as needed for Pain for up to 7 days. Take lowest dose possible to manage pain; may stop taking sooner than 7 days if pain is able to be controlled with non-narcotic analgesics and therapies. CONTINUE taking these medications      ALLOPURINOL PO     aspirin 81 MG EC tablet  TAKE 1 TABLET BY MOUTH EVERY DAY     atorvastatin 40 MG tablet  Commonly known as: LIPITOR  TAKE 1 TABLET BY MOUTH EVERY DAY     enoxaparin 120 MG/0.8ML injection--SEE DETAILS BELOW  Commonly known as: LOVENOX     lisinopril 10 MG tablet  Commonly known as: PRINIVIL;ZESTRIL  TAKE 1 TABLET BY MOUTH IN THE MORNING AND IN THE EVENING     metoprolol tartrate 25 MG tablet  Commonly known as: LOPRESSOR  TAKE 1 TABLET BY MOUTH TWICE A DAY     warfarin 5 MG tablet--SEE DETAILS BELOW  Commonly known as: COUMADIN  Take as directed. If you are unsure how to take this medication, talk to your nurse or doctor. Original instructions: TAKE 2 TABLETS BY MOUTH TWICE A WEEK AND 1 TABLET FIVE TIMES WEEKLY. (TAKE 2 TABLETS ON MON AND FRI AND 1 TABLET ALL OTHER DAYS OF THE WEEK.)            ASK your doctor about these medications      amoxicillin 500 MG capsule  Commonly known as: AMOXIL  Take 1 capsule by mouth See Admin Instructions for 4 doses Take 2 gm one hour prior any dental procedure     predniSONE 10 MG tablet  Commonly known as: [place] Route               Where to Get Your Medications        You can get these medications from any pharmacy    Bring a paper prescription for each of these medications  HYDROcodone-acetaminophen 5-325 MG per tablet       2. Diet as tolerated. 3. Activity: activity as tolerated, no driving while on analgesics, and no heavy lifting for 6 weeks. 4. Wound care: keep incisions clean and dry  5. Follow-up: recommend follow up with PCP in 2-4 weeks. 6. Okay to shower, don't submerge incisions under water. 7. No driving until off pain medication and able to move torso freely without any pain. 8. Return to hospital, or contact Dr. Radha Joseph office ([phone]) if any signs of infection are seen. 9. The patient is not currently smoking. Recommend maintaining a smoke-free lifestyle. 10. Return to Clinic: in 2 weeks. 11. Reviewed discharge instructions, restrictions, and reasons to call the office. 12. Anticoagulation  - Take 10 mg Coumadin this evening 11/3/2022, resume regular dosage tomorrow Friday 11/4/2022  - Resume 120 mg Lovenox injections twice per day, starting this evening 11/3/2022, continue until INR check on Monday 11/7/2022    EDUCATION:  Educated patient on plan of care and disease process--all questions answered. Plans discussed with patient and nursing. Reviewed and discussed with Dr. Shannon Jimenez. Time spent for discharge: 30 minutes, including plan of care, patient education, and care coordination.       Signed:  MONALISA Aguayo CNP  11/3/2022 2:09 PM

## 2022-11-03 NOTE — PROGRESS NOTES
Sitting up in bed resting and watching TV, pt is alert and oriented and c/o abdominal pain which he rates 6/10. Given Dilaudid 1mg IV per pt request.  Assessment done, VSS, call light in reach, will continue to monitor.

## 2022-11-03 NOTE — PLAN OF CARE
Problem: Discharge Planning  Goal: Discharge to home or other facility with appropriate resources  11/3/2022 0853 by Timmy Macias RN  Outcome: Progressing  11/3/2022 0208 by Doug Parham RN  Outcome: Progressing     Problem: Pain  Goal: Verbalizes/displays adequate comfort level or baseline comfort level  11/3/2022 0853 by Timmy Macias RN  Outcome: Progressing  11/3/2022 0208 by Doug Parham RN  Outcome: Progressing     Problem: Safety - Adult  Goal: Free from fall injury  Outcome: Progressing     Problem: ABCDS Injury Assessment  Goal: Absence of physical injury  Outcome: Progressing

## 2022-11-04 NOTE — OP NOTE
[address]                     [address]                                OPERATIVE REPORT    PATIENT NAME: Peter November              :        1970  MED REC NO:   [id number]                          ROOM:         ACCOUNT NO:   [de-identified]                           ADMIT DATE: 2022  PROVIDER:     Atul Herrera MD    DATE OF PROCEDURE:  2022    PREOPERATIVE DIAGNOSIS:  Chronically incarcerated ventral hernia. POSTOPERATIVE DIAGNOSIS:  Chronically incarcerated ventral hernia. PROCEDURE:  Robotic laparoscopic preperitoneal repair of incarcerated  ventral hernia. SURGEON:  Atul Herrera MD    ANESTHESIA:  General endotracheal and local.    ESTIMATED BLOOD LOSS:  Minimal.    COMPLICATIONS:  None. SPECIMENS:  None. OPERATIVE INDICATIONS AND CONSENT:  The patient is a 59-year-old male  with a chronically incarcerated hernia in the mid epigastric region. He  is brought in today for repair. He was explained the risks, benefits,  and possible complications including risk of bleeding, bowel injury,  hernia recurrence or infection requiring mesh removal.  The patient does  have mechanical heart valve. His Coumadin was held preoperatively and  he has been bridged with Lovenox. Lovenox was discontinued yesterday. DETAILS OF THE PROCEDURE:  The patient was brought to the operative  suite and placed in a supine position on the operative table. After  general endotracheal anesthesia, he was prepped and draped in usual  sterile fashion. We made an 8-mm transverse incision in left upper quadrant. A Veress  needle was passed through the peritoneal cavity. After adequate  insufflation, an 8-mm Optiview trocar was placed at this site. An 8-mm  trocar site was placed in left lateral abdomen, followed by an 8-mm  trocar in the left lower quadrant.     The bed was tilted toward the right side and then the robot was docked. The hernia was chronically incarcerated with quite a bit of omentum. The omentum was pulled out with gentle retraction. It was reduced with  a combination of traction and cautery. There was some bleeding of the  omentum, as it was reduced. The bleeding was controlled with cautery  and with bipolar. At this point in time, we eventually completely  reduced the hernia. It was measured. The defect measured slightly  greater than 2 cm. We created a peritoneal flap, starting approximately 5-6 cm to the left  lateral side of the hernia defect. The peritoneum was opened in a  vertical fashion and then we developed the flap in a left-to-right  fashion. The flap was developed with retraction, blunt dissection and  cautery. We were able to reduce a portion of the hernia sac. We then  dissected to the right of the defect. We cleared enough area for a 5-cm  overlap of the mesh. Once the flap had been created, the fascia was closed vertically with a  running O Stratafix suture. We selected a 11.5-cm Ventralight circular  mesh. The mesh was placed in pocket. The fascial sutures run to the  midpoint of the repair and then brought to the very center of the  circular mesh. The mesh was then pushed up against the abdominal wall. We sutured towards us with the 0 Stratafix suture in order to hold the  mesh in place. We then placed four-quadrant 3-0 Vicryl sutures in order  to tack the mesh into place. We had excellent hemostasis within the  pocket. The peritoneal flap was closed with a running 2-0 V-Loc suture. We did have a hole where the sac was dissected out of the subcutaneous  tissue. This was closed with running 2-0 V-Loc suture as well. We then turned our attention back to the omentum. It was carefully  dissected and all areas had been dissected. We did cauterize a few new  areas. There is no evidence of ongoing bleeding.     The trocars were removed under direct visualization. The abdomen was  de-insufflated. The trocars had been previously injected with 0.5%  Marcaine with epinephrine. The skin of the incisions were closed with  running 4-0 subcuticular sutures. Dermabond was then applied. The  patient tolerated the procedure without difficulty and was transferred  to recovery room in stable condition. Darrin Hines.  Sathya Augustin MD    D: 11/03/2022 12:44:04       T: 11/03/2022 12:47:46     [id number]  Job#: [id number]     Doc#: [id number]    CC:

## 2022-11-07 ENCOUNTER — ANTI-COAG VISIT (OUTPATIENT)
Dept: PHARMACY | Age: 52
End: 2022-11-07
Payer: COMMERCIAL

## 2022-11-07 DIAGNOSIS — Z95.2 S/P AVR (AORTIC VALVE REPLACEMENT): Primary | ICD-10-CM

## 2022-11-07 DIAGNOSIS — Z79.01 LONG TERM (CURRENT) USE OF ANTICOAGULANTS: ICD-10-CM

## 2022-11-07 LAB — INTERNATIONAL NORMALIZATION RATIO, POC: 1.3

## 2022-11-07 PROCEDURE — 99212 OFFICE O/P EST SF 10 MIN: CPT

## 2022-11-07 PROCEDURE — 85610 PROTHROMBIN TIME: CPT

## 2022-11-07 NOTE — PROGRESS NOTES
Mr. Awilda Newman is a 45 y.o. y/o male with history of Heart Valve Replacement who presents today for anticoagulation monitoring and adjustment. Pertinent PMH: HTN, PVD,   Pt allergic to sun so will be on prednisone in the summer   Pt has been on warfarin since ~2015  Hx pt of [facility] clinic. Patient Reported Findings:  Yes     No  [x]   []       Patient verifies current dosing regimen as listed pt normal weekly dose has been 10 mg on Mon and Fri and 5 mg all other days of the week ---> confirms dose   []   [x]       S/S bleeding/bruising/swelling/SOB- denies   []   [x]       Blood in urine or stool  [x]   []       Procedures scheduled in the future at this time needs to have hernia surgery, but was not cleared by cardiology so r/s---> got cleared, procedure not scheduled, will have to do lovenox shots and will hold 5 days (November 2)  [x]   []       Missed Dose - denies   []   [x]       Extra Dose - Denies   []   [x]       Change in medications Takes prednisone tapers through summer months when has flare up of reaction (40 mg taper x 16 days) --> 6 day prednisone taper started 9/29---> inc lisinopril --> medrol dose victorino 1/4 and then had another one. and colchicine.  Took ibuprofen, stopped last week -->  Might be starting allopurinol---> started Allopurinol 3 weeks ago, ran out of lisinopril --> still taking allopurinol---> no changes but will have a pred taper end of May before vacation --> resumed prednisone so will be on for 2 weeks---> done with prednisone for now (thinks will have another round but will call us when that is), had a few OTC ASA for HA recently---> started pred taper on 9/8 for 2 weeks---> finished last week Wed he thinks, no other changes --> no changes      []   [x]       Change in health/diet/appetite states that he will eat vit k occasionally every few weeks --> eating greens every day (green beans, lettuce, asparagus, broccoli) states that he has been doing this for long time --> maybe more greens, no NVD ---> less greens, no NVD---> no greens, no NVD --> no changes  []   [x]       Change in alcohol use stopped drinking alcohol for a few weeks --> no changes --> no alcohol for a few weeks d/t gout --> had one beer beginning of year and caused another gout attack --> had more alcohol while was on vacation-->normal amounts    []   [x]       Change in activity  []   [x]       Hospital admission  []   [x]       Emergency department visit  []   [x]       Other complaints      Clinical Outcomes:  Yes     No  []   [x]       Major bleeding event  []   [x]       Thromboembolic event  Uses 5 mg tablets   Duration of warfarin Therapy: indefinite  INR Range:  2.5-3.5        INR is 1.3 today   Procedure 11/2, bridging. Doctor suggested dec lovenox dose post-op but Juan Ronquillo said continue at correct dose. Patient states the bruise is healing and he thinks it was because he injected it too hard and a day early. Take 12.5mg tonight, 10mg tomorrow then continue normal dose of 10 mg on Mon and Fri and 5 mg all other days of the week. Continue bridging twice daily. Refill at [facility].  Encouraged to maintain a consistency of vegetables/salads.     Return to clinic on Friday, 11/11    **consent form signed 11/15/2021    Referring Dr. Amy Aranda   INR (no units)   Date Value   11/02/2022 1.00   04/30/2020 3.29 (H)   09/18/2014 1.94 (H)   08/14/2014 2.46 (H)     INR,(POC) (no units)   Date Value   11/07/2022 1.3   11/01/2022 1.1   10/17/2022 2.9   09/26/2022 2.5     For Pharmacy Admin Tracking Only    Intervention Detail: Dose Adjustment: 1, reason: Therapy Optimization  Total # of Interventions Recommended: 1  Total # of Interventions Accepted: 1  Time Spent (min): 15

## 2022-11-09 ENCOUNTER — TELEPHONE (OUTPATIENT)
Dept: SURGERY | Age: 52
End: 2022-11-09

## 2022-11-09 NOTE — TELEPHONE ENCOUNTER
Pt called this morning requesting for a refill for pain medication. He is requesting refill due to not being able to take ibuprofen due to his mechanical heart valve he has and takes warfarin. States that his bigger incision is the most painful especially when driving.     Please advise

## 2022-11-11 ENCOUNTER — ANTI-COAG VISIT (OUTPATIENT)
Dept: PHARMACY | Age: 52
End: 2022-11-11
Payer: COMMERCIAL

## 2022-11-11 DIAGNOSIS — Z95.2 S/P AVR (AORTIC VALVE REPLACEMENT): Primary | ICD-10-CM

## 2022-11-11 DIAGNOSIS — Z79.01 LONG TERM (CURRENT) USE OF ANTICOAGULANTS: ICD-10-CM

## 2022-11-11 LAB — INTERNATIONAL NORMALIZATION RATIO, POC: 2

## 2022-11-11 PROCEDURE — 99212 OFFICE O/P EST SF 10 MIN: CPT

## 2022-11-11 PROCEDURE — 85610 PROTHROMBIN TIME: CPT

## 2022-11-11 NOTE — PROGRESS NOTES
Mr. Chris Arriaga is a 45 y.o. y/o male with history of Heart Valve Replacement who presents today for anticoagulation monitoring and adjustment. Pertinent PMH: HTN, PVD,   Pt allergic to sun so will be on prednisone in the summer   Pt has been on warfarin since ~2015  [facility]x pt of [facility]. Patient Reported Findings:  Yes     No  [x]   []       Patient verifies current dosing regimen as listed pt normal weekly dose has been 10 mg on Mon and Fri and 5 mg all other days of the week ---> confirms dose   []   [x]       S/S bleeding/bruising/swelling/SOB- denies   []   [x]       Blood in urine or stool  []   [x]       Procedures scheduled in the future at this time needs to have hernia surgery, but was not cleared by cardiology so r/s---> got cleared, procedure not scheduled, will have to do lovenox shots and will hold 5 days (November 2)--> none upcoming   []   [x]       Missed Dose - denies   []   [x]       Extra Dose - Denies   []   [x]       Change in medications Takes prednisone tapers through summer months when has flare up of reaction (40 mg taper x 16 days) --> 6 day prednisone taper started 9/29---> inc lisinopril --> medrol dose victorino 1/4 and then had another one. and colchicine.  Took ibuprofen, stopped last week -->  Might be starting allopurinol---> started Allopurinol 3 weeks ago, ran out of lisinopril --> still taking allopurinol---> no changes but will have a pred taper end of May before vacation --> resumed prednisone so will be on for 2 weeks---> done with prednisone for now (thinks will have another round but will call us when that is), had a few OTC ASA for HA recently---> started pred taper on 9/8 for 2 weeks---> finished last week Wed he thinks, no other changes --> no changes      [x]   []       Change in health/diet/appetite states that he will eat vit k occasionally every few weeks --> eating greens every day (green beans, lettuce, asparagus, broccoli) states that he has been doing this for long time --> maybe more greens, no NVD ---> less greens, no NVD---> no greens, no NVD --> eating a lot of greens, oatmeal, salads   [x]   []       Change in alcohol use stopped drinking alcohol for a few weeks --> no changes --> no alcohol for a few weeks d/t gout --> had one beer beginning of year and caused another gout attack --> had more alcohol while was on vacation-->normal amounts--> less amounts of EtOH, no alcohol in 9 days or so, normally 3-4 drinks 4 nights a week.   []   []       Change in activity  []   [x]       Hospital admission  []   [x]       Emergency department visit  []   [x]       Other complaints      Clinical Outcomes:  Yes     No  []   [x]       Major bleeding event  []   [x]       Thromboembolic event  Uses 5 mg tablets   Duration of warfarin Therapy: indefinite  INR Range:  2.5-3.5      INR is today 2.0, d/t bridging and some more greens also less alcohol. Procedure 11/2, bridging. Doctor suggested dec lovenox dose post-op but Glenny Jamila said continue at correct dose. Patient states the bruise is healing and he thinks it was because he injected it too hard and a day early. --> bruise is healing up. Take 15 mg tonight, then continue normal dose of 10 mg on Mon and Fri and 5 mg all other days of the week. Continue bridging twice daily. Refill at [facility].    Encouraged to maintain a consistency of vegetables/salads. Return to clinic on Tuesday, 11/15.     **consent form signed 11/15/2021    Referring Dr. Piter Pham   INR (no units)   Date Value   11/02/2022 1.00   04/30/2020 3.29 (H)   09/18/2014 1.94 (H)   08/14/2014 2.46 (H)     INR,(POC) (no units)   Date Value   11/07/2022 1.3   11/01/2022 1.1   10/17/2022 2.9   09/26/2022 2.5     For Pharmacy Admin Tracking Only    Intervention Detail: Dose Adjustment: 1, reason: Therapy Optimization  Total # of Interventions Recommended: 1  Total # of Interventions Accepted: 1  Time Spent (min): 15

## 2022-11-14 ENCOUNTER — OFFICE VISIT (OUTPATIENT)
Dept: SURGERY | Age: 52
End: 2022-11-14

## 2022-11-14 VITALS — SYSTOLIC BLOOD PRESSURE: 112 MMHG | WEIGHT: 272 LBS | DIASTOLIC BLOOD PRESSURE: 70 MMHG | BODY MASS INDEX: 37.94 KG/M2

## 2022-11-14 DIAGNOSIS — K43.9 VENTRAL HERNIA WITHOUT OBSTRUCTION OR GANGRENE: Primary | ICD-10-CM

## 2022-11-14 PROCEDURE — 99024 POSTOP FOLLOW-UP VISIT: CPT | Performed by: SURGERY

## 2022-11-14 NOTE — PROGRESS NOTES
Subjective:      Patient ID: Kuldeep Swift III is a 45 y.o. male. HPI    Review of Systems    Objective:   Physical Exam  Incision healing well  Assessment:      57-year-old male status post robotic laparoscopic ventral hernia repair with mesh. Doing well postoperatively. Plan:      Continue lifting restrictions for total of 6 weeks from surgery. Follow-up as needed.         Saran Montesinos MD

## 2022-11-14 NOTE — LETTER
[address]  [address] [address]  Phone: [phone]  Fax: [phone]    November 14, 2022    Patient: Dianne Schwab  MRN:  [id number]  YOB: 1970  Date of Visit: 11/14/2022    Dear Dr Joann Boston:    Thank you for the request for consultation for [address] [place] [address]. Below are the relevant portions of my assessment and plan of care. Assessment:  73-year-old male status post robotic laparoscopic ventral hernia repair with mesh. Doing well postoperatively. Plan:  Continue lifting restrictions for total of 6 weeks from surgery. Follow-up as needed. If you have questions, please do not hesitate to call me.      Sincerely,    Nyasia Carney MD     providers:    Martina Olivas MD  [address] [place] [address]  Via Fax: [phone]

## 2022-11-15 ENCOUNTER — ANTI-COAG VISIT (OUTPATIENT)
Dept: PHARMACY | Age: 52
End: 2022-11-15
Payer: COMMERCIAL

## 2022-11-15 DIAGNOSIS — Z95.2 S/P AVR (AORTIC VALVE REPLACEMENT): Primary | ICD-10-CM

## 2022-11-15 DIAGNOSIS — Z79.01 LONG TERM (CURRENT) USE OF ANTICOAGULANTS: ICD-10-CM

## 2022-11-15 LAB — INTERNATIONAL NORMALIZATION RATIO, POC: 2.4

## 2022-11-15 PROCEDURE — 85610 PROTHROMBIN TIME: CPT

## 2022-11-15 PROCEDURE — 99211 OFF/OP EST MAY X REQ PHY/QHP: CPT

## 2022-11-15 NOTE — PROGRESS NOTES
Mr. Seun Young is a 45 y.o. y/o male with history of Heart Valve Replacement who presents today for anticoagulation monitoring and adjustment. Pertinent PMH: HTN, PVD,   Pt allergic to sun so will be on prednisone in the summer   Pt has been on warfarin since ~2015  Hx pt of [facility] clinic.    Patient Reported Findings:  Yes     No  [x]   []       Patient verifies current dosing regimen as listed pt normal weekly dose has been 10 mg on Mon and Fri and 5 mg all other days of the week ---> confirms dose   []   [x]       S/S bleeding/bruising/swelling/SOB- denies   []   [x]       Blood in urine or stool  []   [x]       Procedures scheduled in the future at this time needs to have hernia surgery, but was not cleared by cardiology so r/s---> got cleared, procedure not scheduled, will have to do lovenox shots and will hold 5 days (November 2)--> none upcoming   []   [x]       Missed Dose - denies   []   [x]       Extra Dose - Denies   []   [x]       Change in medications Takes prednisone tapers through summer months when has flare up of reaction (40 mg taper x 16 days) --> started Allopurinol 3 weeks ago, ran out of lisinopril --> still taking allopurinol---> no changes but will have a pred taper end of May before vacation --> resumed prednisone so will be on for 2 weeks---> done with prednisone for now (thinks will have another round but will call us when that is), had a few OTC ASA for HA recently---> started pred taper on 9/8 for 2 weeks---> finished last week Wed he thinks, no other changes --> no changes      [x]   []       Change in health/diet/appetite states that he will eat vit k occasionally every few weeks --> eating greens every day (green beans, lettuce, asparagus, broccoli) states that he has been doing this for long time --> maybe more greens, no NVD ---> less greens, no NVD---> no greens, no NVD --> eating a lot of greens, oatmeal, salads --> sporadic vit k   [x]   []       Change in alcohol use stopped drinking alcohol for a few weeks --> no changes --> no alcohol for a few weeks d/t gout --> had one beer beginning of year and caused another gout attack --> had more alcohol while was on vacation-->normal amounts--> less amounts of EtOH, no alcohol in 9 days or so, normally 3-4 drinks 4 nights a week. --> no changes, will continue   []   []       Change in activity  []   [x]       Hospital admission  []   [x]       Emergency department visit  []   [x]       Other complaints      Clinical Outcomes:  Yes     No  []   [x]       Major bleeding event  []   [x]       Thromboembolic event  Uses 5 mg tablets   Duration of warfarin Therapy: indefinite  INR Range:  2.5-3.5      INR is 2.4 today after continuing to boost dose. Likely from significantly lower alcohol intake   Since does not plan to drink in near future, will increase weekly dose for time being   Take 10 mg tonight, then increase weekly dose to 10 mg on Mon Wed and Fri and 5 mg all other days of the week. Finish bridging with lovenox tonight   Encouraged to maintain a consistency of vegetables/salads.     Return to clinic on Monday, 11/21  Unavailable later in the week d/t wife surgery     **consent form signed 11/15/2021    Referring Dr. Yohan Osborne   INR (no units)   Date Value   11/02/2022 1.00   04/30/2020 3.29 (H)   09/18/2014 1.94 (H)   08/14/2014 2.46 (H)     INR,(POC) (no units)   Date Value   11/11/2022 2.0   11/07/2022 1.3   11/01/2022 1.1   10/17/2022 2.9     For Pharmacy Admin Tracking Only    Intervention Detail: Dose Adjustment: 1, reason: Therapy Optimization  Total # of Interventions Recommended: 1  Total # of Interventions Accepted: 1  Time Spent (min): 15

## 2022-11-21 ENCOUNTER — ANTI-COAG VISIT (OUTPATIENT)
Dept: PHARMACY | Age: 52
End: 2022-11-21
Payer: COMMERCIAL

## 2022-11-21 DIAGNOSIS — Z95.2 S/P AVR (AORTIC VALVE REPLACEMENT): Primary | ICD-10-CM

## 2022-11-21 DIAGNOSIS — Z79.01 LONG TERM (CURRENT) USE OF ANTICOAGULANTS: ICD-10-CM

## 2022-11-21 LAB — INTERNATIONAL NORMALIZATION RATIO, POC: 2.3

## 2022-11-21 PROCEDURE — 99212 OFFICE O/P EST SF 10 MIN: CPT

## 2022-11-21 PROCEDURE — 85610 PROTHROMBIN TIME: CPT

## 2022-11-21 NOTE — PROGRESS NOTES
Mr. Dicky Fabry is a 45 y.o. y/o male with history of Heart Valve Replacement who presents today for anticoagulation monitoring and adjustment. Pertinent PMH: HTN, PVD,   Pt allergic to sun so will be on prednisone in the summer   Pt has been on warfarin since ~2015  Hx pt of [facility].    Patient Reported Findings:  Yes     No  [x]   []       Patient verifies current dosing regimen as listed pt normal weekly dose has been 10 mg on Mon and Fri and 5 mg all other days of the week ---> confirms dose, used AVS   []   [x]       S/S bleeding/bruising/swelling/SOB- denies   []   [x]       Blood in urine or stool  []   [x]       Procedures scheduled in the future at this time needs to have hernia surgery, but was not cleared by cardiology so r/s---> got cleared, procedure not scheduled, will have to do lovenox shots and will hold 5 days (November 2)--> none upcoming   []   [x]       Missed Dose - denies   []   [x]       Extra Dose - Denies   []   [x]       Change in medications Takes prednisone tapers through summer months when has flare up of reaction (40 mg taper x 16 days) --> started Allopurinol 3 weeks ago, ran out of lisinopril --> still taking allopurinol---> no changes but will have a pred taper end of May before vacation --> resumed prednisone so will be on for 2 weeks---> done with prednisone for now (thinks will have another round but will call us when that is), had a few OTC ASA for HA recently---> started pred taper on 9/8 for 2 weeks---> finished last week Wed he thinks, no other changes --> no changes      [x]   []       Change in health/diet/appetite states that he will eat vit k occasionally every few weeks --> eating greens every day (green beans, lettuce, asparagus, broccoli) states that he has been doing this for long time --> maybe more greens, no NVD ---> less greens, no NVD---> no greens, no NVD --> eating a lot of greens, oatmeal, salads --> sporadic vit k   [x]   [] Change in alcohol use stopped drinking alcohol for a few weeks --> no changes --> no alcohol for a few weeks d/t gout --> had one beer beginning of year and caused another gout attack --> had more alcohol while was on vacation-->normal amounts--> less amounts of EtOH, no alcohol in 9 days or so, normally 3-4 drinks 4 nights a week. --> no changes, will continue---> still not drinking    []   []       Change in activity  []   [x]       Hospital admission  []   [x]       Emergency department visit  []   [x]       Other complaints      Clinical Outcomes:  Yes     No  []   [x]       Major bleeding event  []   [x]       Thromboembolic event  Uses 5 mg tablets   Duration of warfarin Therapy: indefinite  INR Range:  2.5-3.5      INR is 2.3 today after dose increase. Will increase again. Pt still not drinking. Increase dose to 5mg on Sun and Thurs and 10mg all other days   Encouraged to maintain a consistency of vegetables/salads.     Return to clinic in 1 week, 11/29    **consent form signed 11/15/2021    Referring Dr. Piter Pham   INR (no units)   Date Value   11/02/2022 1.00   04/30/2020 3.29 (H)   09/18/2014 1.94 (H)   08/14/2014 2.46 (H)     INR,(POC) (no units)   Date Value   11/21/2022 2.3   11/15/2022 2.4   11/11/2022 2.0   11/07/2022 1.3     For Pharmacy Admin Tracking Only    Intervention Detail: Dose Adjustment: 1, reason: Therapy Optimization  Total # of Interventions Recommended: 1  Total # of Interventions Accepted: 1  Time Spent (min): 15

## 2022-11-23 ENCOUNTER — APPOINTMENT (OUTPATIENT)
Dept: CT IMAGING | Age: 52
DRG: 392 | End: 2022-11-23
Payer: COMMERCIAL

## 2022-11-23 ENCOUNTER — HOSPITAL ENCOUNTER (INPATIENT)
Age: 52
LOS: 3 days | Discharge: HOME | DRG: 392 | End: 2022-11-26
Attending: SURGERY | Admitting: SURGERY
Payer: COMMERCIAL

## 2022-11-23 ENCOUNTER — TELEPHONE (OUTPATIENT)
Dept: SURGERY | Age: 52
End: 2022-11-23

## 2022-11-23 DIAGNOSIS — K57.32 DIVERTICULITIS OF COLON: Primary | ICD-10-CM

## 2022-11-23 DIAGNOSIS — K57.32 SIGMOID DIVERTICULITIS: ICD-10-CM

## 2022-11-23 LAB
A/G RATIO: 1.2 (ref 1.1–2.2)
ALBUMIN SERPL-MCNC: 4 G/DL (ref 3.4–5)
ALP BLD-CCNC: 123 U/L (ref 40–129)
ALT SERPL-CCNC: 29 U/L (ref 10–40)
ANION GAP SERPL CALCULATED.3IONS-SCNC: 9 MMOL/L (ref 3–16)
AST SERPL-CCNC: 26 U/L (ref 15–37)
BACTERIA: ABNORMAL /HPF
BASOPHILS ABSOLUTE: 0.1 K/UL (ref 0–0.2)
BASOPHILS RELATIVE PERCENT: 1.4 %
BILIRUB SERPL-MCNC: 0.4 MG/DL (ref 0–1)
BILIRUBIN URINE: NEGATIVE
BLOOD, URINE: ABNORMAL
BUN BLDV-MCNC: 12 MG/DL (ref 7–20)
CALCIUM SERPL-MCNC: 9.8 MG/DL (ref 8.3–10.6)
CHLORIDE BLD-SCNC: 101 MMOL/L (ref 99–110)
CLARITY: ABNORMAL
CO2: 27 MMOL/L (ref 21–32)
COLOR: YELLOW
CREAT SERPL-MCNC: 0.8 MG/DL (ref 0.9–1.3)
EOSINOPHILS ABSOLUTE: 0.2 K/UL (ref 0–0.6)
EOSINOPHILS RELATIVE PERCENT: 2.4 %
EPITHELIAL CELLS, UA: 0 /HPF (ref 0–5)
GFR SERPL CREATININE-BSD FRML MDRD: >60 ML/MIN/{1.73_M2}
GLUCOSE BLD-MCNC: 88 MG/DL (ref 70–99)
GLUCOSE URINE: NEGATIVE MG/DL
HCT VFR BLD CALC: 42.1 % (ref 40.5–52.5)
HEMOGLOBIN: 14.1 G/DL (ref 13.5–17.5)
HYALINE CASTS: 0 /LPF (ref 0–8)
KETONES, URINE: NEGATIVE MG/DL
LEUKOCYTE ESTERASE, URINE: NEGATIVE
LIPASE: 28 U/L (ref 13–60)
LYMPHOCYTES ABSOLUTE: 1.9 K/UL (ref 1–5.1)
LYMPHOCYTES RELATIVE PERCENT: 27.5 %
MCH RBC QN AUTO: 31.4 PG (ref 26–34)
MCHC RBC AUTO-ENTMCNC: 33.4 G/DL (ref 31–36)
MCV RBC AUTO: 93.8 FL (ref 80–100)
MICROSCOPIC EXAMINATION: YES
MONOCYTES ABSOLUTE: 0.6 K/UL (ref 0–1.3)
MONOCYTES RELATIVE PERCENT: 8.2 %
NEUTROPHILS ABSOLUTE: 4.1 K/UL (ref 1.7–7.7)
NEUTROPHILS RELATIVE PERCENT: 60.5 %
NITRITE, URINE: NEGATIVE
PDW BLD-RTO: 13.9 % (ref 12.4–15.4)
PH UA: 8 (ref 5–8)
PLATELET # BLD: 324 K/UL (ref 135–450)
PMV BLD AUTO: 7.9 FL (ref 5–10.5)
POTASSIUM SERPL-SCNC: 4.6 MMOL/L (ref 3.5–5.1)
PROTEIN UA: NEGATIVE MG/DL
RBC # BLD: 4.49 M/UL (ref 4.2–5.9)
RBC UA: 8 /HPF (ref 0–4)
SODIUM BLD-SCNC: 137 MMOL/L (ref 136–145)
SPECIFIC GRAVITY UA: 1.01 (ref 1–1.03)
TOTAL PROTEIN: 7.4 G/DL (ref 6.4–8.2)
URINE REFLEX TO CULTURE: ABNORMAL
URINE TYPE: ABNORMAL
UROBILINOGEN, URINE: 1 E.U./DL
WBC # BLD: 6.8 K/UL (ref 4–11)
WBC UA: 0 /HPF (ref 0–5)

## 2022-11-23 PROCEDURE — 6360000002 HC RX W HCPCS: Performed by: SURGERY

## 2022-11-23 PROCEDURE — 2500000003 HC RX 250 WO HCPCS: Performed by: SURGERY

## 2022-11-23 PROCEDURE — 80053 COMPREHEN METABOLIC PANEL: CPT

## 2022-11-23 PROCEDURE — 2580000003 HC RX 258: Performed by: SURGERY

## 2022-11-23 PROCEDURE — 2580000003 HC RX 258: Performed by: PHYSICIAN ASSISTANT

## 2022-11-23 PROCEDURE — 94150 VITAL CAPACITY TEST: CPT

## 2022-11-23 PROCEDURE — 94761 N-INVAS EAR/PLS OXIMETRY MLT: CPT

## 2022-11-23 PROCEDURE — 96374 THER/PROPH/DIAG INJ IV PUSH: CPT

## 2022-11-23 PROCEDURE — 36415 COLL VENOUS BLD VENIPUNCTURE: CPT

## 2022-11-23 PROCEDURE — 6360000004 HC RX CONTRAST MEDICATION: Performed by: PHYSICIAN ASSISTANT

## 2022-11-23 PROCEDURE — 85025 COMPLETE CBC W/AUTO DIFF WBC: CPT

## 2022-11-23 PROCEDURE — 6370000000 HC RX 637 (ALT 250 FOR IP): Performed by: SURGERY

## 2022-11-23 PROCEDURE — 6360000002 HC RX W HCPCS: Performed by: PHYSICIAN ASSISTANT

## 2022-11-23 PROCEDURE — 1200000000 HC SEMI PRIVATE

## 2022-11-23 PROCEDURE — 99285 EMERGENCY DEPT VISIT HI MDM: CPT

## 2022-11-23 PROCEDURE — 74177 CT ABD & PELVIS W/CONTRAST: CPT

## 2022-11-23 PROCEDURE — 99222 1ST HOSP IP/OBS MODERATE 55: CPT | Performed by: SURGERY

## 2022-11-23 PROCEDURE — 96375 TX/PRO/DX INJ NEW DRUG ADDON: CPT

## 2022-11-23 PROCEDURE — 81001 URINALYSIS AUTO W/SCOPE: CPT

## 2022-11-23 PROCEDURE — 83690 ASSAY OF LIPASE: CPT

## 2022-11-23 PROCEDURE — 6360000002 HC RX W HCPCS

## 2022-11-23 RX ORDER — ONDANSETRON 2 MG/ML
4 INJECTION INTRAMUSCULAR; INTRAVENOUS ONCE
Status: COMPLETED | OUTPATIENT
Start: 2022-11-23 | End: 2022-11-23

## 2022-11-23 RX ORDER — 0.9 % SODIUM CHLORIDE 0.9 %
1000 INTRAVENOUS SOLUTION INTRAVENOUS ONCE
Status: COMPLETED | OUTPATIENT
Start: 2022-11-23 | End: 2022-11-23

## 2022-11-23 RX ORDER — ENOXAPARIN SODIUM 100 MG/ML
40 INJECTION SUBCUTANEOUS DAILY
Status: DISCONTINUED | OUTPATIENT
Start: 2022-11-23 | End: 2022-11-23 | Stop reason: DRUGHIGH

## 2022-11-23 RX ORDER — KETOROLAC TROMETHAMINE 30 MG/ML
INJECTION, SOLUTION INTRAMUSCULAR; INTRAVENOUS
Status: COMPLETED
Start: 2022-11-23 | End: 2022-11-23

## 2022-11-23 RX ORDER — SODIUM CHLORIDE 0.9 % (FLUSH) 0.9 %
5-40 SYRINGE (ML) INJECTION EVERY 12 HOURS SCHEDULED
Status: DISCONTINUED | OUTPATIENT
Start: 2022-11-23 | End: 2022-11-26 | Stop reason: HOSPADM

## 2022-11-23 RX ORDER — SODIUM CHLORIDE 9 MG/ML
INJECTION, SOLUTION INTRAVENOUS PRN
Status: DISCONTINUED | OUTPATIENT
Start: 2022-11-23 | End: 2022-11-26 | Stop reason: HOSPADM

## 2022-11-23 RX ORDER — ENOXAPARIN SODIUM 100 MG/ML
30 INJECTION SUBCUTANEOUS 2 TIMES DAILY
Status: DISCONTINUED | OUTPATIENT
Start: 2022-11-23 | End: 2022-11-25 | Stop reason: ALTCHOICE

## 2022-11-23 RX ORDER — ATORVASTATIN CALCIUM 40 MG/1
40 TABLET, FILM COATED ORAL NIGHTLY
Status: DISCONTINUED | OUTPATIENT
Start: 2022-11-23 | End: 2022-11-26 | Stop reason: HOSPADM

## 2022-11-23 RX ORDER — MORPHINE SULFATE 4 MG/ML
4 INJECTION, SOLUTION INTRAMUSCULAR; INTRAVENOUS ONCE
Status: COMPLETED | OUTPATIENT
Start: 2022-11-23 | End: 2022-11-23

## 2022-11-23 RX ORDER — ONDANSETRON 2 MG/ML
4 INJECTION INTRAMUSCULAR; INTRAVENOUS EVERY 6 HOURS PRN
Status: DISCONTINUED | OUTPATIENT
Start: 2022-11-23 | End: 2022-11-26 | Stop reason: HOSPADM

## 2022-11-23 RX ORDER — SODIUM CHLORIDE 0.9 % (FLUSH) 0.9 %
5-40 SYRINGE (ML) INJECTION PRN
Status: DISCONTINUED | OUTPATIENT
Start: 2022-11-23 | End: 2022-11-26 | Stop reason: HOSPADM

## 2022-11-23 RX ORDER — ALLOPURINOL 300 MG/1
300 TABLET ORAL DAILY
Status: DISCONTINUED | OUTPATIENT
Start: 2022-11-24 | End: 2022-11-26 | Stop reason: HOSPADM

## 2022-11-23 RX ORDER — DEXTROSE, SODIUM CHLORIDE, AND POTASSIUM CHLORIDE 5; .45; .15 G/100ML; G/100ML; G/100ML
INJECTION INTRAVENOUS CONTINUOUS
Status: DISCONTINUED | OUTPATIENT
Start: 2022-11-23 | End: 2022-11-25

## 2022-11-23 RX ORDER — HYDROMORPHONE HYDROCHLORIDE 1 MG/ML
1 INJECTION, SOLUTION INTRAMUSCULAR; INTRAVENOUS; SUBCUTANEOUS
Status: DISCONTINUED | OUTPATIENT
Start: 2022-11-23 | End: 2022-11-26 | Stop reason: HOSPADM

## 2022-11-23 RX ORDER — ASPIRIN 81 MG/1
81 TABLET ORAL DAILY
Status: DISCONTINUED | OUTPATIENT
Start: 2022-11-24 | End: 2022-11-26 | Stop reason: HOSPADM

## 2022-11-23 RX ORDER — KETOROLAC TROMETHAMINE 30 MG/ML
30 INJECTION, SOLUTION INTRAMUSCULAR; INTRAVENOUS ONCE
Status: COMPLETED | OUTPATIENT
Start: 2022-11-23 | End: 2022-11-23

## 2022-11-23 RX ADMIN — SODIUM CHLORIDE 1000 ML: 9 INJECTION, SOLUTION INTRAVENOUS at 14:49

## 2022-11-23 RX ADMIN — KETOROLAC TROMETHAMINE 30 MG: 30 INJECTION, SOLUTION INTRAMUSCULAR at 15:17

## 2022-11-23 RX ADMIN — PIPERACILLIN AND TAZOBACTAM 3375 MG: 3; .375 INJECTION, POWDER, FOR SOLUTION INTRAVENOUS at 22:23

## 2022-11-23 RX ADMIN — SODIUM CHLORIDE, PRESERVATIVE FREE 10 ML: 5 INJECTION INTRAVENOUS at 21:25

## 2022-11-23 RX ADMIN — POTASSIUM CHLORIDE, DEXTROSE MONOHYDRATE AND SODIUM CHLORIDE: 150; 5; 450 INJECTION, SOLUTION INTRAVENOUS at 18:49

## 2022-11-23 RX ADMIN — PIPERACILLIN AND TAZOBACTAM 4500 MG: 4; .5 INJECTION, POWDER, LYOPHILIZED, FOR SOLUTION INTRAVENOUS at 18:51

## 2022-11-23 RX ADMIN — KETOROLAC TROMETHAMINE 30 MG: 30 INJECTION, SOLUTION INTRAMUSCULAR; INTRAVENOUS at 15:17

## 2022-11-23 RX ADMIN — MORPHINE SULFATE 4 MG: 4 INJECTION, SOLUTION INTRAMUSCULAR; INTRAVENOUS at 14:46

## 2022-11-23 RX ADMIN — ONDANSETRON 4 MG: 2 INJECTION INTRAMUSCULAR; INTRAVENOUS at 14:45

## 2022-11-23 RX ADMIN — HYDROMORPHONE HYDROCHLORIDE 1 MG: 1 INJECTION, SOLUTION INTRAMUSCULAR; INTRAVENOUS; SUBCUTANEOUS at 17:20

## 2022-11-23 RX ADMIN — HYDROMORPHONE HYDROCHLORIDE 1 MG: 1 INJECTION, SOLUTION INTRAMUSCULAR; INTRAVENOUS; SUBCUTANEOUS at 21:23

## 2022-11-23 RX ADMIN — METOPROLOL TARTRATE 25 MG: 25 TABLET, FILM COATED ORAL at 21:23

## 2022-11-23 RX ADMIN — ATORVASTATIN CALCIUM 40 MG: 40 TABLET, FILM COATED ORAL at 21:23

## 2022-11-23 RX ADMIN — IOPAMIDOL 75 ML: 755 INJECTION, SOLUTION INTRAVENOUS at 13:58

## 2022-11-23 ASSESSMENT — PAIN SCALES - GENERAL
PAINLEVEL_OUTOF10: 7
PAINLEVEL_OUTOF10: 10
PAINLEVEL_OUTOF10: 8
PAINLEVEL_OUTOF10: 7
PAINLEVEL_OUTOF10: 10

## 2022-11-23 ASSESSMENT — ENCOUNTER SYMPTOMS
CHEST TIGHTNESS: 0
SHORTNESS OF BREATH: 0
DIARRHEA: 1
VOMITING: 0
ABDOMINAL PAIN: 1
NAUSEA: 0
BLOOD IN STOOL: 0

## 2022-11-23 ASSESSMENT — PAIN DESCRIPTION - LOCATION
LOCATION: ABDOMEN

## 2022-11-23 ASSESSMENT — PAIN - FUNCTIONAL ASSESSMENT: PAIN_FUNCTIONAL_ASSESSMENT: 0-10

## 2022-11-23 ASSESSMENT — PAIN DESCRIPTION - ORIENTATION
ORIENTATION: LEFT
ORIENTATION: LEFT

## 2022-11-23 ASSESSMENT — PAIN DESCRIPTION - DESCRIPTORS: DESCRIPTORS: TENDER;SORE;SHARP

## 2022-11-23 ASSESSMENT — LIFESTYLE VARIABLES
HOW MANY STANDARD DRINKS CONTAINING ALCOHOL DO YOU HAVE ON A TYPICAL DAY: 3 OR 4
HOW OFTEN DO YOU HAVE A DRINK CONTAINING ALCOHOL: 4 OR MORE TIMES A WEEK

## 2022-11-23 NOTE — ED NOTES
Pt in ct scan, pain 10/10 despite morphine.   Per dr Surjit Hernadez give Toradol 30mg iv      Félix Gandhi RN  11/23/22 2639

## 2022-11-23 NOTE — TELEPHONE ENCOUNTER
Pt called this morning stating that last Friday he had spoke with someone regarding how he was feeling: he is having pain in his lower abdomen, trouble standing. States that he is worse now: not able to stand up straight, cannot lay in the prone position, a lot of pain in the lower abdomen, hurts to walk. Wanting advice.

## 2022-11-23 NOTE — ED PROVIDER NOTES
[address] [facility]        Pt Name: Dianne Schwab  MRN: [id number]  [place] 1970  Date of evaluation: 11/23/2022  Provider: Taylor Allen PA-C  PCP: Martina Olivas MD  Note Started: 2:16 PM EST 11/23/22          KARLY. I have evaluated this patient. My supervising physician was available for consultation. CHIEF COMPLAINT       Chief Complaint   Patient presents with    Abdominal Pain     Pt c/o abdominal pain starting 3 weeks ago. PT had hernia surgery 3 weeks ago and pain has increased. Denies N/V, fever. Pt has been having problems with having BM, has been taking laxatives with minimal effect. HISTORY OF PRESENT ILLNESS   (Location, Timing/Onset, Context/Setting, Quality, Duration, Modifying Factors, Severity, Associated Signs and Symptoms)  Note limiting factors. Chief Complaint: Abdominal pain    Ildefonso Interiano III is a 45 y.o. male who presents to the emergency department today complaining of abdominal pain. He had a ventral hernia repair with Dr. Gisela Treadwell 3 weeks ago. He states he has had abdominal pain since that time. He describes his abdominal pain as a sharp, constant, 2-10/10 pain that localizes to his left abdomen but radiates to the periumbilical region at times. He denies nausea or vomiting. He states he has really only had 2 somewhat normal bowel movements since surgery. Most the time it is loose. He does admit to taking MiraLAX. He has no urinary complaints. He denies chest pain or shortness of breath. He has had no fevers or chills    Nursing Notes were all reviewed and agreed with or any disagreements were addressed in the HPI. REVIEW OF SYSTEMS    (2-9 systems for level 4, 10 or more for level 5)     Review of Systems   Constitutional:  Negative for chills and fever. Respiratory:  Negative for chest tightness and shortness of breath.     Cardiovascular:  Negative for chest pain.   Gastrointestinal:  Positive for abdominal pain and diarrhea. Negative for blood in stool, nausea and vomiting. Genitourinary:  Negative for dysuria. Neurological:  Negative for dizziness, light-headedness and headaches. All other systems reviewed and are negative. Positives and Pertinent negatives as per HPI. Except as noted above in the ROS, all other systems were reviewed and negative.        PAST MEDICAL HISTORY     Past Medical History:   Diagnosis Date    Aortic stenosis     mild as per pt 7-23-14    Colon polyp 06/01/2014    GERD (gastroesophageal reflux disease)     Headache(784.0)     Heart valve replaced     takes coumadin; aortic mechanical    Hernia 01/01/2014    Hyperlipidemia     Hypertension     Neuropathy     hand and feet         SURGICAL HISTORY     Past Surgical History:   Procedure Laterality Date    AORTIC VALVE REPLACEMENT  08/08/2014    Dr. Delbert Luther - 22-mm ATS  mechanical valve    APPENDECTOMY      CARDIAC SURGERY      COLONOSCOPY      HERNIA REPAIR N/A 11/2/2022    ROBOT ASSISTED LAPAROSCOPIC VENTRAL HERNIA REPAIR WITH MESH performed by Keli Beatty MD at [address] [place] [address]       Previous Medications    ALLOPURINOL PO    Take 300 mg by mouth daily    AMOXICILLIN (AMOXIL) 500 MG CAPSULE    Take 1 capsule by mouth See Admin Instructions for 4 doses Take 2 gm one hour prior any dental procedure    ASPIRIN 81 MG EC TABLET    TAKE 1 TABLET BY MOUTH EVERY DAY    ATORVASTATIN (LIPITOR) 40 MG TABLET    TAKE 1 TABLET BY MOUTH EVERY DAY    ENOXAPARIN (LOVENOX) 120 MG/0.8ML INJECTION    INJECT 1 SYRINGE SUBCUTANEOUSLY TWICE A DAY AS DIRECTED    LISINOPRIL (PRINIVIL;ZESTRIL) 10 MG TABLET    TAKE 1 TABLET BY MOUTH IN THE MORNING AND IN THE EVENING    METOPROLOL TARTRATE (LOPRESSOR) 25 MG TABLET    TAKE 1 TABLET BY MOUTH TWICE A DAY    PREDNISONE (DELTASONE) 10 MG TABLET    Take 10-40 mg by mouth as needed Seasonal (Summer only)    WARFARIN (COUMADIN) 5 MG TABLET TAKE 2 TABLETS BY MOUTH TWICE A WEEK AND 1 TABLET FIVE TIMES WEEKLY. (TAKE 2 TABLETS ON MON AND FRI AND 1 TABLET ALL OTHER DAYS OF THE WEEK.)         ALLERGIES     Patient has no known allergies. FAMILYHISTORY       Family History   Problem Relation Age of Onset    High Blood Pressure Mother     High Blood Pressure Maternal Grandmother     Heart Disease Maternal Grandmother           SOCIAL HISTORY       Social History     Tobacco Use    Smoking status: Some Days     Packs/day: 0.25     Years: 20.00     Pack years: 5.00     Types: Cigarettes    Smokeless tobacco: Never   Vaping Use    Vaping Use: Never used   Substance Use Topics    Alcohol use: Yes     Alcohol/week: 3.0 standard drinks     Types: 3 Cans of beer per week     Comment: 2-4 drinks per day, liquor 4 days a week    Drug use: Yes     Types: Marijuana [place])     Comment: gummys, 3 times per month       SCREENINGS    [place] Coma Scale  Eye Opening: Spontaneous  Best Verbal Response: Oriented  Best Motor Response: Obeys commands  [place] Coma Scale Score: 15        PHYSICAL EXAM    (up to 7 for level 4, 8 or more for level 5)     ED Triage Vitals [11/23/22 1220]   BP Temp Temp Source Heart Rate Resp SpO2 Height Weight   127/83 97.8 °F (36.6 °C) Oral 84 18 97 % -- 262 lb (118.8 kg)       Physical Exam  Vitals and nursing note reviewed. Constitutional:       Appearance: He is well-developed. He is not diaphoretic. HENT:      Head: Normocephalic and atraumatic. Mouth/Throat:      Mouth: Mucous membranes are moist.   Eyes:      General:         Right eye: No discharge. Left eye: No discharge. Cardiovascular:      Rate and Rhythm: Normal rate and regular rhythm. Pulses: Normal pulses. Heart sounds: Normal heart sounds. Pulmonary:      Effort: Pulmonary effort is normal. No respiratory distress. Breath sounds: Normal breath sounds. Abdominal:      General: Abdomen is flat.  Bowel sounds are normal.      Palpations: Abdomen is soft. Tenderness: There is generalized abdominal tenderness. There is no guarding or rebound. Comments: Patient has somewhat generalized tenderness on palpation of the abdomen. There is no distention, rigidity or guarding. His surgical incisions are healing well without signs of infection. Musculoskeletal:         General: Normal range of motion. Cervical back: Normal range of motion and neck supple. Skin:     General: Skin is warm and dry. Coloration: Skin is not pale. Neurological:      Mental Status: He is alert and oriented to person, place, and time. Psychiatric:         Behavior: Behavior normal.       DIAGNOSTIC RESULTS   LABS:    Labs Reviewed   COMPREHENSIVE METABOLIC PANEL - Abnormal; Notable for the following components:       Result Value    Creatinine 0.8 (*)     All other components within normal limits   URINALYSIS WITH REFLEX TO CULTURE - Abnormal; Notable for the following components:    Clarity, UA CLOUDY (*)     Blood, Urine TRACE (*)     All other components within normal limits   MICROSCOPIC URINALYSIS - Abnormal; Notable for the following components:    RBC, UA 8 (*)     All other components within normal limits   CBC WITH AUTO DIFFERENTIAL   LIPASE   PROTIME-INR       When ordered only abnormal lab results are displayed. All other labs were within normal range or not returned as of this dictation. EKG: When ordered, EKG's are interpreted by the Emergency Department Physician in the absence of a cardiologist.  Please see their note for interpretation of EKG.     RADIOLOGY:   Non-plain film images such as CT, Ultrasound and MRI are read by the radiologist. Plain radiographic images are visualized and preliminarily interpreted by the ED Provider with the below findings:        Interpretation per the Radiologist below, if available at the time of this note:    CT ABDOMEN PELVIS W IV CONTRAST Additional Contrast? None   Final Result   Acute descending colonic diverticulitis. No fluid collection or   pneumoperitoneum. PROCEDURES   Unless otherwise noted below, none     Procedures    CRITICAL CARE TIME       CONSULTS:  None      EMERGENCY DEPARTMENT COURSE and DIFFERENTIAL DIAGNOSIS/MDM:   Vitals:    Vitals:    11/23/22 1220 11/23/22 1452 11/23/22 1500   BP: 127/83 127/89 122/84   Pulse: 84     Resp: 18     Temp: 97.8 °F (36.6 °C)     TempSrc: Oral     SpO2: 97% 98% 94%   Weight: 262 lb (118.8 kg)         Patient was given the following medications:  Medications   0.9 % sodium chloride bolus (1,000 mLs IntraVENous New Bag 11/23/22 1449)   morphine injection 4 mg (4 mg IntraVENous Given 11/23/22 1446)   ondansetron (ZOFRAN) injection 4 mg (4 mg IntraVENous Given 11/23/22 1445)   iopamidol (ISOVUE-370) 76 % injection 75 mL (75 mLs IntraVENous Given 11/23/22 1358)   ketorolac (TORADOL) injection 30 mg (30 mg IntraVENous Given 11/23/22 1517)         Is this patient to be included in the SEP-1 Core Measure due to severe sepsis or septic shock? No   Exclusion criteria - the patient is NOT to be included for SEP-1 Core Measure due to: Infection is not suspected    Patient presented to the emergency department today complaining of somewhat severe abdominal pain he has had since having a ventral hernia repair completed 3 weeks ago. He does have generalized abdominal tenderness on palpation. His abdomen is soft with bowel sounds present in all 4 quadrants. Most of his pain is in the left abdomen but does radiate to the periumbilical region. Work including CBC, BMP, LFTs, lipase and urine analysis are unremarkable. CT abdomen shows acute descending colonic diverticulitis with no fluid collection or pneumoperitoneum. Patient is given fluids as well as pain and nausea medication. I did consult his general surgeon, Dr. Dickson Rodriguez, who states he will place admission orders at this time.   Patient is normotensive without tachycardia, tachypnea or hypoxemia    FINAL IMPRESSION      1. Diverticulitis of colon          DISPOSITION/PLAN   DISPOSITION Decision To Admit 11/23/2022 04:03:24 PM      PATIENT REFERRED TO:  No follow-up provider specified.     DISCHARGE MEDICATIONS:  New Prescriptions    No medications on file       DISCONTINUED MEDICATIONS:  Discontinued Medications    No medications on file              (Please note that portions of this note were completed with a voice recognition program.  Efforts were made to edit the dictations but occasionally words are mis-transcribed.)    Won Holbrook PA-C (electronically signed)          Won Holbrook PA-C  11/23/22 2017

## 2022-11-24 LAB
BASOPHILS ABSOLUTE: 0.1 K/UL (ref 0–0.2)
BASOPHILS RELATIVE PERCENT: 2.2 %
EOSINOPHILS ABSOLUTE: 0.2 K/UL (ref 0–0.6)
EOSINOPHILS RELATIVE PERCENT: 4.5 %
HCT VFR BLD CALC: 34.2 % (ref 40.5–52.5)
HEMOGLOBIN: 11.9 G/DL (ref 13.5–17.5)
LYMPHOCYTES ABSOLUTE: 2.1 K/UL (ref 1–5.1)
LYMPHOCYTES RELATIVE PERCENT: 42.6 %
MCH RBC QN AUTO: 32.6 PG (ref 26–34)
MCHC RBC AUTO-ENTMCNC: 34.7 G/DL (ref 31–36)
MCV RBC AUTO: 93.9 FL (ref 80–100)
MONOCYTES ABSOLUTE: 0.5 K/UL (ref 0–1.3)
MONOCYTES RELATIVE PERCENT: 10.8 %
NEUTROPHILS ABSOLUTE: 2 K/UL (ref 1.7–7.7)
NEUTROPHILS RELATIVE PERCENT: 39.9 %
PDW BLD-RTO: 14.2 % (ref 12.4–15.4)
PLATELET # BLD: 276 K/UL (ref 135–450)
PMV BLD AUTO: 8 FL (ref 5–10.5)
RBC # BLD: 3.64 M/UL (ref 4.2–5.9)
WBC # BLD: 5 K/UL (ref 4–11)

## 2022-11-24 PROCEDURE — 2500000003 HC RX 250 WO HCPCS: Performed by: SURGERY

## 2022-11-24 PROCEDURE — 1200000000 HC SEMI PRIVATE

## 2022-11-24 PROCEDURE — 36415 COLL VENOUS BLD VENIPUNCTURE: CPT

## 2022-11-24 PROCEDURE — 6360000002 HC RX W HCPCS: Performed by: SURGERY

## 2022-11-24 PROCEDURE — 2580000003 HC RX 258: Performed by: SURGERY

## 2022-11-24 PROCEDURE — 99232 SBSQ HOSP IP/OBS MODERATE 35: CPT | Performed by: SURGERY

## 2022-11-24 PROCEDURE — 85025 COMPLETE CBC W/AUTO DIFF WBC: CPT

## 2022-11-24 PROCEDURE — 6370000000 HC RX 637 (ALT 250 FOR IP): Performed by: SURGERY

## 2022-11-24 RX ORDER — KETOROLAC TROMETHAMINE 30 MG/ML
30 INJECTION, SOLUTION INTRAMUSCULAR; INTRAVENOUS EVERY 6 HOURS PRN
Status: DISCONTINUED | OUTPATIENT
Start: 2022-11-24 | End: 2022-11-26 | Stop reason: HOSPADM

## 2022-11-24 RX ADMIN — SODIUM CHLORIDE, PRESERVATIVE FREE 10 ML: 5 INJECTION INTRAVENOUS at 21:26

## 2022-11-24 RX ADMIN — HYDROMORPHONE HYDROCHLORIDE 1 MG: 1 INJECTION, SOLUTION INTRAMUSCULAR; INTRAVENOUS; SUBCUTANEOUS at 15:37

## 2022-11-24 RX ADMIN — POTASSIUM CHLORIDE, DEXTROSE MONOHYDRATE AND SODIUM CHLORIDE: 150; 5; 450 INJECTION, SOLUTION INTRAVENOUS at 16:31

## 2022-11-24 RX ADMIN — ALLOPURINOL 300 MG: 300 TABLET ORAL at 08:30

## 2022-11-24 RX ADMIN — ENOXAPARIN SODIUM 30 MG: 100 INJECTION SUBCUTANEOUS at 08:30

## 2022-11-24 RX ADMIN — SODIUM CHLORIDE, PRESERVATIVE FREE 10 ML: 5 INJECTION INTRAVENOUS at 08:30

## 2022-11-24 RX ADMIN — PIPERACILLIN AND TAZOBACTAM 3375 MG: 3; .375 INJECTION, POWDER, FOR SOLUTION INTRAVENOUS at 05:50

## 2022-11-24 RX ADMIN — PIPERACILLIN AND TAZOBACTAM 3375 MG: 3; .375 INJECTION, POWDER, FOR SOLUTION INTRAVENOUS at 14:23

## 2022-11-24 RX ADMIN — HYDROMORPHONE HYDROCHLORIDE 1 MG: 1 INJECTION, SOLUTION INTRAMUSCULAR; INTRAVENOUS; SUBCUTANEOUS at 18:42

## 2022-11-24 RX ADMIN — HYDROMORPHONE HYDROCHLORIDE 1 MG: 1 INJECTION, SOLUTION INTRAMUSCULAR; INTRAVENOUS; SUBCUTANEOUS at 08:34

## 2022-11-24 RX ADMIN — POTASSIUM CHLORIDE, DEXTROSE MONOHYDRATE AND SODIUM CHLORIDE: 150; 5; 450 INJECTION, SOLUTION INTRAVENOUS at 05:52

## 2022-11-24 RX ADMIN — METOPROLOL TARTRATE 25 MG: 25 TABLET, FILM COATED ORAL at 08:30

## 2022-11-24 RX ADMIN — HYDROMORPHONE HYDROCHLORIDE 1 MG: 1 INJECTION, SOLUTION INTRAMUSCULAR; INTRAVENOUS; SUBCUTANEOUS at 05:27

## 2022-11-24 RX ADMIN — ENOXAPARIN SODIUM 30 MG: 100 INJECTION SUBCUTANEOUS at 21:26

## 2022-11-24 RX ADMIN — ASPIRIN 81 MG: 81 TABLET, COATED ORAL at 08:30

## 2022-11-24 RX ADMIN — ATORVASTATIN CALCIUM 40 MG: 40 TABLET, FILM COATED ORAL at 21:26

## 2022-11-24 RX ADMIN — PIPERACILLIN AND TAZOBACTAM 3375 MG: 3; .375 INJECTION, POWDER, FOR SOLUTION INTRAVENOUS at 21:36

## 2022-11-24 RX ADMIN — KETOROLAC TROMETHAMINE 30 MG: 30 INJECTION, SOLUTION INTRAMUSCULAR; INTRAVENOUS at 13:06

## 2022-11-24 ASSESSMENT — ENCOUNTER SYMPTOMS
DIARRHEA: 1
EYE DISCHARGE: 0
COLOR CHANGE: 0
APNEA: 0
ABDOMINAL PAIN: 1
CHEST TIGHTNESS: 0
BACK PAIN: 0
EYE ITCHING: 0

## 2022-11-24 ASSESSMENT — PAIN DESCRIPTION - ORIENTATION
ORIENTATION: LEFT;LOWER

## 2022-11-24 ASSESSMENT — PAIN DESCRIPTION - DESCRIPTORS
DESCRIPTORS: ACHING
DESCRIPTORS: ACHING;CRAMPING
DESCRIPTORS: ACHING

## 2022-11-24 ASSESSMENT — PAIN DESCRIPTION - LOCATION
LOCATION: ABDOMEN

## 2022-11-24 ASSESSMENT — PAIN SCALES - GENERAL
PAINLEVEL_OUTOF10: 2
PAINLEVEL_OUTOF10: 7
PAINLEVEL_OUTOF10: 4
PAINLEVEL_OUTOF10: 7
PAINLEVEL_OUTOF10: 7
PAINLEVEL_OUTOF10: 5
PAINLEVEL_OUTOF10: 7

## 2022-11-24 ASSESSMENT — PAIN DESCRIPTION - PAIN TYPE: TYPE: SURGICAL PAIN

## 2022-11-24 NOTE — PROGRESS NOTES
Incentive Spirometry education and demonstration completed by Respiratory Therapy Yes      Response to education: Excellent     Teaching Time: 5 minutes    Minimum Predicted Vital Capacity - 610 mL. Patient's Actual Vital Capacity - 1100 mL. Turning over to Nursing for routine follow-up Yes.     Comments: Patient familiar with IS    Electronically signed by Cornelia Huynh RCP on 11/23/2022 at 8:45 PM

## 2022-11-24 NOTE — PROGRESS NOTES
Shift assessment completed, pt is alert and oriented, VSS, fall precautions in place, call light within reach, Dilaudid for pain, see flowsheets and MAR, will monitor pt. The care plan and education has been reviewed and mutually agreed upon with the patient.

## 2022-11-24 NOTE — H&P
Rosalva Li Friend III     Chief complaint-left-sided abdominal pain    HPI: 57-year-old male who is status post robotic laparoscopic repair of a ventral hernia with mesh on 11/2/2022. Presented to the ER today with plaints of left-sided abdominal pain. Not able to stand up. The pain was sharp and severe. No nausea or vomiting. Had liquid stools. No history of fevers, chills or urinary symptoms    Past Medical History:   Diagnosis Date    Aortic stenosis     mild as per pt 7-23-14    Colon polyp 06/01/2014    GERD (gastroesophageal reflux disease)     Headache(784.0)     Heart valve replaced     takes coumadin; aortic mechanical    Hernia 01/01/2014    Hyperlipidemia     Hypertension     Neuropathy     hand and feet       Past Surgical History:   Procedure Laterality Date    AORTIC VALVE REPLACEMENT  08/08/2014    Dr. Camara Arms - 22-mm ATS  mechanical valve    APPENDECTOMY      CARDIAC SURGERY      COLONOSCOPY      HERNIA REPAIR N/A 11/2/2022    ROBOT ASSISTED LAPAROSCOPIC VENTRAL HERNIA REPAIR WITH MESH performed by Liam Domingo MD at [address] History    Marital status:      Spouse name: Not on file    Number of children: Not on file    Years of education: Not on file    Highest education level: Not on file   Occupational History    Not on file   Tobacco Use    Smoking status: Some Days     Packs/day: 0.25     Years: 20.00     Pack years: 5.00     Types: Cigarettes    Smokeless tobacco: Never   Vaping Use    Vaping Use: Never used   Substance and Sexual Activity    Alcohol use:  Yes     Alcohol/week: 3.0 standard drinks     Types: 3 Cans of beer per week     Comment: 2-4 drinks per day, liquor 4 days a week    Drug use: Yes     Types: Marijuana Mike Spina)     Comment: gummys, 3 times per month    Sexual activity: Yes     Partners: Female   Other Topics Concern    Not on file   Social History Narrative    Not on file     Social Determinants of Health Financial Resource Strain: Not on file   Food Insecurity: Not on file   Transportation Needs: Not on file   Physical Activity: Not on file   Stress: Not on file   Social Connections: Not on file   Intimate Partner Violence: Not on file   Housing Stability: Not on file       Allergies: No Known Allergies    Prior to Admission medications    Medication Sig Start Date End Date Taking? Authorizing Provider   enoxaparin (LOVENOX) 120 MG/0.8ML injection INJECT 1 SYRINGE SUBCUTANEOUSLY TWICE A DAY AS DIRECTED 10/17/22   Historical Provider, MD   metoprolol tartrate (LOPRESSOR) 25 MG tablet TAKE 1 TABLET BY MOUTH TWICE A DAY 9/23/22   Estefania Casanova, DO   atorvastatin (LIPITOR) 40 MG tablet TAKE 1 TABLET BY MOUTH EVERY DAY 9/23/22   Estefania Casanova, DO   lisinopril (PRINIVIL;ZESTRIL) 10 MG tablet TAKE 1 TABLET BY MOUTH IN THE MORNING AND IN THE EVENING 8/24/22   Estefania Casanova, DO   ALLOPURINOL PO Take 300 mg by mouth daily    Historical Provider, MD   aspirin 81 MG EC tablet TAKE 1 TABLET BY MOUTH EVERY DAY 4/18/22   Estefania Casanova, DO   warfarin (COUMADIN) 5 MG tablet TAKE 2 TABLETS BY MOUTH TWICE A WEEK AND 1 TABLET FIVE TIMES WEEKLY. (TAKE 2 TABLETS ON MON AND FRI AND 1 TABLET ALL OTHER DAYS OF THE WEEK.) 3/17/22 8/23/22  Estefania Casanova, DO   amoxicillin (AMOXIL) 500 MG capsule Take 1 capsule by mouth See Admin Instructions for 4 doses Take 2 gm one hour prior any dental procedure 9/30/21   Estefania Casanova, DO   predniSONE (DELTASONE) 10 MG tablet Take 10-40 mg by mouth as needed Seasonal (Summer only) 5/22/19   Historical Provider, MD       Principal Problem:    Sigmoid diverticulitis  Resolved Problems:    * No resolved hospital problems. *      Blood pressure 125/76, pulse 61, temperature 97.6 °F (36.4 °C), temperature source Oral, resp. rate 16, height 5' 11\" (1.803 m), weight 262 lb (118.8 kg), SpO2 98 %. Review of Systems   Constitutional:  Positive for activity change and appetite change.  Negative for chills and fever.   HENT:  Negative for congestion and dental problem. Eyes:  Negative for discharge and itching. Respiratory:  Negative for apnea and chest tightness. Cardiovascular:  Negative for chest pain and leg swelling. Gastrointestinal:  Positive for abdominal pain and diarrhea. Endocrine: Negative for cold intolerance and heat intolerance. Genitourinary:  Negative for difficulty urinating and dysuria. Musculoskeletal:  Negative for arthralgias and back pain. Skin:  Negative for color change and pallor. Allergic/Immunologic: Negative for environmental allergies and food allergies. Neurological:  Negative for dizziness and facial asymmetry. Hematological:  Negative for adenopathy. Does not bruise/bleed easily. Psychiatric/Behavioral:  Negative for agitation and behavioral problems. Physical Exam  Vitals reviewed: Uncomfortable appearing, appears dehydrated. Constitutional:       Appearance: He is well-developed. HENT:      Head: Normocephalic and atraumatic. Right Ear: External ear normal.      Left Ear: External ear normal.   Eyes:      Conjunctiva/sclera: Conjunctivae normal.   Cardiovascular:      Rate and Rhythm: Normal rate and regular rhythm. Pulmonary:      Effort: Pulmonary effort is normal.      Breath sounds: Normal breath sounds. Abdominal:      General: There is no distension. Palpations: Abdomen is soft. Tenderness: There is abdominal tenderness. Musculoskeletal:         General: Normal range of motion. Cervical back: Normal range of motion and neck supple. Skin:     General: Skin is warm and dry. Neurological:      Mental Status: He is alert and oriented to person, place, and time. Psychiatric:         Behavior: Behavior normal.       Assessment:  57-year-old male known to me from a robotic laparoscopic ventral hernia repair with mesh approximately 3 weeks ago.   He presented to the emergency room with a 5-day history of left-sided abdominal pain.  Physical examination reveals focal left lower quadrant abdominal tenderness with peritoneal findings. He does appear slightly dehydrated. White blood count is normal at 6.8. The remainder of his laboratory studies are unremarkable and his vital signs are stable. CAT scan of the abdomen pelvis was reviewed. It shows postoperative changes only with regards to the ventral hernia repair. He does have acute uncomplicated diverticulitis involving the distal descending colon. This correlates with the patient's symptoms. Plan:   For IV antibiotics and pain control    Tirso Norris MD  11/24/2022

## 2022-11-24 NOTE — PLAN OF CARE
Problem: Discharge Planning  Goal: Discharge to home or other facility with appropriate resources  11/24/2022 0909 by Carly Rod RN  Outcome: Progressing  11/23/2022 2336 by Darline Christopher RN  Outcome: Progressing     Problem: Pain  Goal: Verbalizes/displays adequate comfort level or baseline comfort level  11/24/2022 0909 by Carly Rod RN  Outcome: Progressing  11/23/2022 2336 by Darline Christopher RN  Outcome: Progressing

## 2022-11-24 NOTE — PROGRESS NOTES
Arnold Marquez Friend III is a 45 y.o. male patient. Chief complaint-left-sided abdominal pain    HPI-45year-old male seen in follow-up for acute diverticulitis of the descending colon  Current Facility-Administered Medications   Medication Dose Route Frequency Provider Last Rate Last Admin    ketorolac (TORADOL) injection 30 mg  30 mg IntraVENous Q6H PRN Taylor Mueller MD        dextrose 5 % and 0.45 % NaCl with KCl 20 mEq infusion   IntraVENous Continuous Taylor Mueller MD 75 mL/hr at 11/24/22 1024 Rate Change at 11/24/22 1024    sodium chloride flush 0.9 % injection 5-40 mL  5-40 mL IntraVENous 2 times per day Taylor Mueller MD   10 mL at 11/24/22 0830    sodium chloride flush 0.9 % injection 5-40 mL  5-40 mL IntraVENous PRN Taylor Mueller MD        0.9 % sodium chloride infusion   IntraVENous PRN Taylor Mueller MD        HYDROmorphone HCl PF (DILAUDID) injection 1 mg  1 mg IntraVENous Q2H PRN Taylor Mueller MD   1 mg at 11/24/22 0834    allopurinol (ZYLOPRIM) tablet 300 mg  300 mg Oral Daily Taylor Mueller MD   300 mg at 11/24/22 0830    aspirin EC tablet 81 mg  81 mg Oral Daily Taylor Mueller MD   81 mg at 11/24/22 0830    metoprolol tartrate (LOPRESSOR) tablet 25 mg  25 mg Oral BID Taylor Mueller MD   25 mg at 11/24/22 0830    atorvastatin (LIPITOR) tablet 40 mg  40 mg Oral Nightly Taylor Mueller MD   40 mg at 11/23/22 2123    ondansetron (ZOFRAN) injection 4 mg  4 mg IntraVENous Q6H PRN Taylor Mueller MD        piperacillin-tazobactam (ZOSYN) 3,375 mg in dextrose 5 % 50 mL IVPB (mini-bag)  3,375 mg IntraVENous Q8H Taylor Mueller MD   Stopped at 11/24/22 1014    enoxaparin Sodium (LOVENOX) injection 30 mg  30 mg SubCUTAneous BID Taylor Mueller MD   30 mg at 11/24/22 0830     No Known Allergies  Principal Problem:    Sigmoid diverticulitis  Resolved Problems:    * No resolved hospital problems.  *    Blood pressure 125/76, pulse 61, temperature 97.6 °F (36.4 °C), temperature source Oral, resp. rate 16, height 5' 11\" (1.803 m), weight 262 lb (118.8 kg), SpO2 98 %. Subjective:  Symptoms:  Improved. Activity level: Impaired due to pain. Pain:  He complains of pain that is moderate. Objective:  General Appearance:  Comfortable. Vital signs: (most recent): Blood pressure 125/76, pulse 61, temperature 97.6 °F (36.4 °C), temperature source Oral, resp. rate 16, height 5' 11\" (1.803 m), weight 262 lb (118.8 kg), SpO2 98 %. Output: Producing urine and no stool output. Lungs:  Normal effort. Abdomen: Abdomen is soft and non-distended. There is left lower quadrant tenderness. Neurological: Patient is alert and oriented to person, place and time. Skin:  Warm and dry. Assessment & Plan 61-year-old male seen in follow-up for acute uncomplicated diverticulitis of the descending colon. Doing better today. Continue IV antibiotics. Add Toradol for better pain control. General diet. Hopefully home in next 24 to 48 hours.     Israel Long MD  11/24/2022

## 2022-11-24 NOTE — PROGRESS NOTES
Assessment completed. Pain medication has helped. Wife helped him get cleaned up. Denies needs at this time. Will continue to monitor.

## 2022-11-25 LAB
INR BLD: 2.23 (ref 0.87–1.14)
PROTHROMBIN TIME: 24.8 SEC (ref 11.7–14.5)

## 2022-11-25 PROCEDURE — 6370000000 HC RX 637 (ALT 250 FOR IP): Performed by: SURGERY

## 2022-11-25 PROCEDURE — 85610 PROTHROMBIN TIME: CPT

## 2022-11-25 PROCEDURE — 99232 SBSQ HOSP IP/OBS MODERATE 35: CPT | Performed by: SURGERY

## 2022-11-25 PROCEDURE — 2500000003 HC RX 250 WO HCPCS: Performed by: SURGERY

## 2022-11-25 PROCEDURE — 6360000002 HC RX W HCPCS: Performed by: SURGERY

## 2022-11-25 PROCEDURE — 2580000003 HC RX 258: Performed by: SURGERY

## 2022-11-25 PROCEDURE — 1200000000 HC SEMI PRIVATE

## 2022-11-25 PROCEDURE — 36415 COLL VENOUS BLD VENIPUNCTURE: CPT

## 2022-11-25 RX ORDER — OXYCODONE HYDROCHLORIDE AND ACETAMINOPHEN 5; 325 MG/1; MG/1
2 TABLET ORAL EVERY 4 HOURS PRN
Status: DISCONTINUED | OUTPATIENT
Start: 2022-11-25 | End: 2022-11-26 | Stop reason: HOSPADM

## 2022-11-25 RX ORDER — WARFARIN SODIUM 5 MG/1
5 TABLET ORAL
Status: DISCONTINUED | OUTPATIENT
Start: 2022-11-26 | End: 2022-11-26

## 2022-11-25 RX ORDER — WARFARIN SODIUM 5 MG/1
10 TABLET ORAL
Status: DISCONTINUED | OUTPATIENT
Start: 2022-11-25 | End: 2022-11-26

## 2022-11-25 RX ORDER — OXYCODONE HYDROCHLORIDE AND ACETAMINOPHEN 5; 325 MG/1; MG/1
1 TABLET ORAL EVERY 4 HOURS PRN
Status: DISCONTINUED | OUTPATIENT
Start: 2022-11-25 | End: 2022-11-26 | Stop reason: HOSPADM

## 2022-11-25 RX ORDER — POLYETHYLENE GLYCOL 3350 17 G/17G
17 POWDER, FOR SOLUTION ORAL DAILY PRN
Status: DISCONTINUED | OUTPATIENT
Start: 2022-11-25 | End: 2022-11-26 | Stop reason: HOSPADM

## 2022-11-25 RX ADMIN — METOPROLOL TARTRATE 25 MG: 25 TABLET, FILM COATED ORAL at 08:16

## 2022-11-25 RX ADMIN — ATORVASTATIN CALCIUM 40 MG: 40 TABLET, FILM COATED ORAL at 21:47

## 2022-11-25 RX ADMIN — ASPIRIN 81 MG: 81 TABLET, COATED ORAL at 08:16

## 2022-11-25 RX ADMIN — METOPROLOL TARTRATE 25 MG: 25 TABLET, FILM COATED ORAL at 21:47

## 2022-11-25 RX ADMIN — ENOXAPARIN SODIUM 30 MG: 100 INJECTION SUBCUTANEOUS at 08:16

## 2022-11-25 RX ADMIN — OXYCODONE HYDROCHLORIDE AND ACETAMINOPHEN 2 TABLET: 5; 325 TABLET ORAL at 17:23

## 2022-11-25 RX ADMIN — HYDROMORPHONE HYDROCHLORIDE 1 MG: 1 INJECTION, SOLUTION INTRAMUSCULAR; INTRAVENOUS; SUBCUTANEOUS at 10:01

## 2022-11-25 RX ADMIN — OXYCODONE HYDROCHLORIDE AND ACETAMINOPHEN 2 TABLET: 5; 325 TABLET ORAL at 21:47

## 2022-11-25 RX ADMIN — SODIUM CHLORIDE, PRESERVATIVE FREE 10 ML: 5 INJECTION INTRAVENOUS at 21:53

## 2022-11-25 RX ADMIN — POTASSIUM CHLORIDE, DEXTROSE MONOHYDRATE AND SODIUM CHLORIDE: 150; 5; 450 INJECTION, SOLUTION INTRAVENOUS at 04:56

## 2022-11-25 RX ADMIN — HYDROMORPHONE HYDROCHLORIDE 1 MG: 1 INJECTION, SOLUTION INTRAMUSCULAR; INTRAVENOUS; SUBCUTANEOUS at 04:56

## 2022-11-25 RX ADMIN — OXYCODONE HYDROCHLORIDE AND ACETAMINOPHEN 2 TABLET: 5; 325 TABLET ORAL at 12:06

## 2022-11-25 RX ADMIN — SODIUM CHLORIDE: 9 INJECTION, SOLUTION INTRAVENOUS at 21:48

## 2022-11-25 RX ADMIN — WARFARIN SODIUM 10 MG: 5 TABLET ORAL at 17:23

## 2022-11-25 RX ADMIN — ALLOPURINOL 300 MG: 300 TABLET ORAL at 08:16

## 2022-11-25 RX ADMIN — POLYETHYLENE GLYCOL 3350 17 G: 17 POWDER, FOR SOLUTION ORAL at 12:06

## 2022-11-25 RX ADMIN — HYDROMORPHONE HYDROCHLORIDE 1 MG: 1 INJECTION, SOLUTION INTRAMUSCULAR; INTRAVENOUS; SUBCUTANEOUS at 00:17

## 2022-11-25 RX ADMIN — PIPERACILLIN AND TAZOBACTAM 3375 MG: 3; .375 INJECTION, POWDER, FOR SOLUTION INTRAVENOUS at 21:51

## 2022-11-25 RX ADMIN — PIPERACILLIN AND TAZOBACTAM 3375 MG: 3; .375 INJECTION, POWDER, FOR SOLUTION INTRAVENOUS at 15:33

## 2022-11-25 RX ADMIN — PIPERACILLIN AND TAZOBACTAM 3375 MG: 3; .375 INJECTION, POWDER, FOR SOLUTION INTRAVENOUS at 04:59

## 2022-11-25 ASSESSMENT — PAIN SCALES - GENERAL
PAINLEVEL_OUTOF10: 7
PAINLEVEL_OUTOF10: 6
PAINLEVEL_OUTOF10: 4
PAINLEVEL_OUTOF10: 7

## 2022-11-25 ASSESSMENT — PAIN DESCRIPTION - LOCATION
LOCATION: ABDOMEN

## 2022-11-25 ASSESSMENT — PAIN DESCRIPTION - ORIENTATION
ORIENTATION: MID;LOWER
ORIENTATION: LEFT

## 2022-11-25 ASSESSMENT — PAIN - FUNCTIONAL ASSESSMENT: PAIN_FUNCTIONAL_ASSESSMENT: ACTIVITIES ARE NOT PREVENTED

## 2022-11-25 ASSESSMENT — PAIN DESCRIPTION - DESCRIPTORS
DESCRIPTORS: CRAMPING
DESCRIPTORS: DISCOMFORT
DESCRIPTORS: CRAMPING

## 2022-11-25 NOTE — CONSULTS
Clinical Pharmacy Note: Pharmacy to Dose Warfarin    Pharmacy consulted by Dr. Oriana Adams to dose warfarin. Hallie Almanza III is a 45 y.o. male  is receiving warfarin for indication: aortic valve replacement. INR Goal Range: 2.5-3.5  Prior to admission warfarin dosing regimen: 10 mg on Mon, Wed, Fri and 5 mg on all other days  INR today:   Lab Results   Component Value Date/Time    INR 2.23 11/25/2022 04:30 AM       Assessment/Plan:  INR is subtherapeutic on current inpatient dosing regimen. Based on today's assessment, dose warfarin 10 mg tonight. Daily INR is ordered. Pharmacy will continue to monitor and make adjustments to regimen as necessary.      Thank you for the consult,     Chip Amaro, PharmD, [address] [facility] [employer] Pharmacist  [id number]

## 2022-11-25 NOTE — PROGRESS NOTES
Marcos Carl III is a 45 y.o. male patient. Chief complaint-left lower quadrant abdominal pain    HPI-45year-old male seen in follow-up for diverticulitis  Current Facility-Administered Medications   Medication Dose Route Frequency Provider Last Rate Last Admin    ketorolac (TORADOL) injection 30 mg  30 mg IntraVENous Q6H PRN Nabor Edmondson MD   30 mg at 11/24/22 1306    dextrose 5 % and 0.45 % NaCl with KCl 20 mEq infusion   IntraVENous Continuous Nabor Edmondson MD 75 mL/hr at 11/25/22 0456 New Bag at 11/25/22 0456    sodium chloride flush 0.9 % injection 5-40 mL  5-40 mL IntraVENous 2 times per day Nabor Edmondson MD   10 mL at 11/24/22 2126    sodium chloride flush 0.9 % injection 5-40 mL  5-40 mL IntraVENous PRN Nabor Edmondson MD        0.9 % sodium chloride infusion   IntraVENous PRN Nabor Edmondson MD        HYDROmorphone HCl PF (DILAUDID) injection 1 mg  1 mg IntraVENous Q2H PRN Nabor Edmondson MD   1 mg at 11/25/22 1001    allopurinol (ZYLOPRIM) tablet 300 mg  300 mg Oral Daily Nabor Edmondson MD   300 mg at 11/25/22 0816    aspirin EC tablet 81 mg  81 mg Oral Daily Nabor Edmondson MD   81 mg at 11/25/22 0816    metoprolol tartrate (LOPRESSOR) tablet 25 mg  25 mg Oral BID Nabor Edmondson MD   25 mg at 11/25/22 0816    atorvastatin (LIPITOR) tablet 40 mg  40 mg Oral Nightly Nabor Edmondson MD   40 mg at 11/24/22 2126    ondansetron (ZOFRAN) injection 4 mg  4 mg IntraVENous Q6H PRN Nabor Edmondson MD        piperacillin-tazobactam (ZOSYN) 3,375 mg in dextrose 5 % 50 mL IVPB (mini-bag)  3,375 mg IntraVENous Q8H Nabor Edmondson MD   Stopped at 11/25/22 1131    enoxaparin Sodium (LOVENOX) injection 30 mg  30 mg SubCUTAneous BID Nabor Edmondson MD   30 mg at 11/25/22 0816     No Known Allergies  Principal Problem:    Sigmoid diverticulitis  Resolved Problems:    * No resolved hospital problems.  *    Blood pressure 127/74, pulse 75, temperature 97.5 °F (36.4 °C), temperature source Oral, resp. rate 16, height 5' 11\" (1.803 m), weight 262 lb (118.8 kg), SpO2 97 %. Subjective:  Symptoms:  Improved. Activity level: Returning to normal.    Pain:  He complains of pain that is moderate. Objective:  General Appearance:  Comfortable. Vital signs: (most recent): Blood pressure 127/74, pulse 75, temperature 97.5 °F (36.4 °C), temperature source Oral, resp. rate 16, height 5' 11\" (1.803 m), weight 262 lb (118.8 kg), SpO2 97 %. Output: Producing urine and no stool output. Lungs:  Normal effort and normal respiratory rate. Abdomen: Abdomen is soft and non-distended. There is left lower quadrant tenderness. Neurological: Patient is alert and oriented to person, place and time. Skin:  Warm and dry. Assessment & Plan 78-year-old male seen in follow-up for acute diverticulitis. Doing better today. Continue IV antibiotics. Regular diet. Hopefully home tomorrow on oral antibiotics.     Roxy Brandt MD  11/25/2022

## 2022-11-25 NOTE — PROGRESS NOTES
2122: Assessment complete, VSS, BS active, pt denies BM today, passing flatus, slight pain to LLQ abd, declines pain med at this time, tolerating regular diet, discussed care plan, pt mutually agrees, call light and belongings in reach, no other needs expressed at this time. 0017: pt having increased abd pain after getting in/out of bed, gave dilaudid, see MAR.   Karyn Hirsch RN

## 2022-11-26 VITALS
WEIGHT: 262 LBS | HEIGHT: 71 IN | HEART RATE: 63 BPM | TEMPERATURE: 97.5 F | OXYGEN SATURATION: 96 % | BODY MASS INDEX: 36.68 KG/M2 | DIASTOLIC BLOOD PRESSURE: 78 MMHG | SYSTOLIC BLOOD PRESSURE: 139 MMHG | RESPIRATION RATE: 16 BRPM

## 2022-11-26 LAB
INR BLD: 1.97 (ref 0.87–1.14)
PROTHROMBIN TIME: 22.4 SEC (ref 11.7–14.5)

## 2022-11-26 PROCEDURE — 6370000000 HC RX 637 (ALT 250 FOR IP): Performed by: SURGERY

## 2022-11-26 PROCEDURE — 2580000003 HC RX 258: Performed by: SURGERY

## 2022-11-26 PROCEDURE — 36415 COLL VENOUS BLD VENIPUNCTURE: CPT

## 2022-11-26 PROCEDURE — 85610 PROTHROMBIN TIME: CPT

## 2022-11-26 PROCEDURE — 6360000002 HC RX W HCPCS: Performed by: SURGERY

## 2022-11-26 PROCEDURE — 99232 SBSQ HOSP IP/OBS MODERATE 35: CPT | Performed by: SURGERY

## 2022-11-26 RX ORDER — WARFARIN SODIUM 5 MG/1
5 TABLET ORAL
Status: DISCONTINUED | OUTPATIENT
Start: 2022-11-27 | End: 2022-11-26 | Stop reason: HOSPADM

## 2022-11-26 RX ORDER — WARFARIN SODIUM 5 MG/1
10 TABLET ORAL
Status: DISCONTINUED | OUTPATIENT
Start: 2022-11-26 | End: 2022-11-26 | Stop reason: HOSPADM

## 2022-11-26 RX ORDER — HYDROCODONE BITARTRATE AND ACETAMINOPHEN 5; 325 MG/1; MG/1
1-2 TABLET ORAL EVERY 4 HOURS PRN
Qty: 18 TABLET | Refills: 0 | Status: SHIPPED | OUTPATIENT
Start: 2022-11-26 | End: 2022-11-29

## 2022-11-26 RX ORDER — AMOXICILLIN AND CLAVULANATE POTASSIUM 875; 125 MG/1; MG/1
1 TABLET, FILM COATED ORAL 2 TIMES DAILY
Qty: 14 TABLET | Refills: 0 | Status: SHIPPED | OUTPATIENT
Start: 2022-11-26 | End: 2022-12-03

## 2022-11-26 RX ORDER — WARFARIN SODIUM 5 MG/1
10 TABLET ORAL
Status: DISCONTINUED | OUTPATIENT
Start: 2022-11-28 | End: 2022-11-26 | Stop reason: HOSPADM

## 2022-11-26 RX ADMIN — PIPERACILLIN AND TAZOBACTAM 3375 MG: 3; .375 INJECTION, POWDER, FOR SOLUTION INTRAVENOUS at 06:28

## 2022-11-26 RX ADMIN — OXYCODONE HYDROCHLORIDE AND ACETAMINOPHEN 2 TABLET: 5; 325 TABLET ORAL at 02:03

## 2022-11-26 RX ADMIN — SODIUM CHLORIDE: 9 INJECTION, SOLUTION INTRAVENOUS at 06:26

## 2022-11-26 RX ADMIN — KETOROLAC TROMETHAMINE 30 MG: 30 INJECTION, SOLUTION INTRAMUSCULAR; INTRAVENOUS at 01:07

## 2022-11-26 ASSESSMENT — PAIN SCALES - GENERAL
PAINLEVEL_OUTOF10: 8
PAINLEVEL_OUTOF10: 4
PAINLEVEL_OUTOF10: 7

## 2022-11-26 ASSESSMENT — PAIN - FUNCTIONAL ASSESSMENT: PAIN_FUNCTIONAL_ASSESSMENT: ACTIVITIES ARE NOT PREVENTED

## 2022-11-26 NOTE — DISCHARGE INSTRUCTIONS
- Follow-up with Dr. Rober Sanchez in 1 week, call to schedule appointment  [phone]  - Activity as tolerated  - Regular diet as tolerated  - Even if you begin to feel better, make sure to take your antibiotic until it is all gone. - If you experience reoccurring symptoms, excessive pain or temperatures greater than 101.5, call your doctor or return to the ER. amoxicillin and clavulanate potassium  Pronunciation:  am OK i FEDERICO in [address] ate [place] TAS ee um  Brand:  Augmentin  What is the most important information I should know about amoxicillin and clavulanate potassium? You should not use this medicine if you have severe kidney disease, if you have had liver problems or jaundice while taking amoxicillin and clavulanate potassium, or if you are allergic to any penicillin or cephalosporin antibiotic, such as Amoxil, Ceftin, Cefzil, Moxatag, Omnicef, and others. What is amoxicillin and clavulanate potassium? Amoxicillin is a penicillin antibiotic. Clavulanate potassium helps prevent certain bacteria from becoming resistant to amoxicillin. Amoxicillin and clavulanate potassium is a combination medicine used to treat many different infections caused by bacteria, such as sinusitis, pneumonia, ear infections, bronchitis, urinary tract infections, and infections of the skin. Amoxicillin and clavulanate potassium may also be used for purposes not listed in this medication guide. What should I discuss with my healthcare provider before taking amoxicillin and clavulanate potassium? You should not use this medicine if you are allergic to it, or if:  you have severe kidney disease (or if you are on dialysis);  you have had liver problems or jaundice while taking amoxicillin and clavulanate potassium; or  you are allergic to any penicillin or cephalosporin antibiotic, such as Amoxil, Ceftin, Cefzil, Moxatag, Omnicef, and others.   Tell your doctor if you have ever had:  liver disease (hepatitis or jaundice);  kidney disease; or  mononucleosis. The liquid or chewable tablet may contain phenylalanine. Tell your doctor if you have phenylketonuria (PKU). Tell your doctor if you are pregnant or breastfeeding. Amoxicillin and clavulanate potassium can make birth control pills less effective. Ask your doctor about using a non-hormonal birth control (condom, diaphragm, cervical cap, or contraceptive sponge) to prevent pregnancy. Do not give this medicine to a child without medical advice. How should I take amoxicillin and clavulanate potassium? Follow all directions on your prescription label and read all medication guides or instruction sheets. Use the medicine exactly as directed. Amoxicillin and clavulanate potassium may work best if you take it at the start of a meal.  Take the medicine every 12 hours. Do not crush or chew the extended-release tablet. Swallow the pill whole, or break the pill in half and take both halves one at a time. Tell your doctor if you have trouble swallowing a whole or half pill. You must chew the chewable tablet before you swallow it. Shake the oral suspension (liquid) before you measure a dose. Use the dosing syringe provided, or use a medicine dose-measuring device (not a kitchen spoon). This medicine can affect the results of certain medical tests. Tell any doctor who treats you that you are using amoxicillin and clavulanate potassium. Use this medicine for the full prescribed length of time, even if your symptoms quickly improve. Skipping doses can increase your risk of infection that is resistant to medication. Amoxicillin and clavulanate potassium will not treat a viral infection such as the flu or a common cold. Store the tablets at room temperature away from moisture and heat. Store the liquid  in the refrigerator. Throw away any unused liquid after 10 days. What happens if I miss a dose?   Take the medicine as soon as you can, but skip the missed dose if it is almost time for your next dose. Do not take two doses at one time. What happens if I overdose? Seek emergency medical attention or call the Poison Help line at 1-457.134.2278. Overdose can cause nausea, vomiting, stomach pain, diarrhea, skin rash, drowsiness, hyperactivity, and decreased urination. What should I avoid while taking amoxicillin and clavulanate potassium? Avoid taking this medicine together with or just after eating a high-fat meal. This will make it harder for your body to absorb the medication. Antibiotic medicines can cause diarrhea, which may be a sign of a new infection. If you have diarrhea that is watery or bloody, call your doctor before using anti-diarrhea medicine. What are the possible side effects of amoxicillin and clavulanate potassium? Get emergency medical help if you have signs of an allergic reaction (hives, difficult breathing, swelling in your face or throat) or a severe skin reaction (fever, sore throat, burning eyes, skin pain, red or purple skin rash with blistering and peeling). Call your doctor at once if you have:  severe stomach pain, diarrhea that is watery or bloody (even if it occurs months after your last dose);  pale or yellowed skin, dark colored urine, fever, confusion or weakness;  loss of appetite, upper stomach pain;  little or no urination; or  easy bruising or bleeding. Common side effects may include:  nausea, vomiting; diarrhea;  rash, itching;  vaginal itching or discharge; or  diaper rash. This is not a complete list of side effects and others may occur. Call your doctor for medical advice about side effects. You may report side effects to FDA at 9-730-NOS-2647. What other drugs will affect amoxicillin and clavulanate potassium? Tell your doctor about all your other medicines, especially:  allopurinol;  probenecid; or  a blood thinner --warfarin, Coumadin, Jantoven. This list is not complete.  Other drugs may affect amoxicillin and clavulanate potassium, including prescription and over-the-counter medicines, vitamins, and herbal products. Not all possible drug interactions are listed here. Where can I get more information? Your pharmacist can provide more information about amoxicillin and clavulanate potassium. Remember, keep this and all other medicines out of the reach of children, never share your medicines with others, and use this medication only for the indication prescribed. Every effort has been made to ensure that the information provided by [address] [place] [address]  is accurate, up-to-date, and complete, but no guarantee is made to that effect. Drug information contained herein may be time sensitive. [facility] information has been compiled for use by healthcare practitioners and consumers in the [place] and therefore [facility] does not warrant that uses outside of the [place] are appropriate, unless specifically indicated otherwise. [facility]'s drug information does not endorse drugs, diagnose patients or recommend therapy. [facility] drug information is an informational resource designed to assist licensed healthcare practitioners in caring for their patients and/or to serve consumers viewing this service as a supplement to, and not a substitute for, the expertise, skill, knowledge and judgment of healthcare practitioners. The absence of a warning for a given drug or drug combination in no way should be construed to indicate that the drug or drug combination is safe, effective or appropriate for any given patient. [facility] does not assume any responsibility for any aspect of healthcare administered with the aid of information [facility] provides. The information contained herein is not intended to cover all possible uses, directions, precautions, warnings, drug interactions, allergic reactions, or adverse effects.  If you have questions about the drugs you are taking, check with your doctor, nurse or pharmacist.  Copyright 3086-2082 [employer] Version: 12.. Revision date: 2020. Care instructions adapted under license by [facility] ([facility]). If you have questions about a medical condition or this instruction, always ask your healthcare professional. [address]  any warranty or liability for your use of this information. acetaminophen and hydrocodone  Pronunciation:  gustavo SEET a MIN oh fen and savannah droe KOE done  Brand:  Hycet, Lorcet, Norco, Verdrocet, Vicodin, Xodol, Zamicet  What is the most important information I should know about acetaminophen and hydrocodone? MISUSE OF OPIOID MEDICINE CAN CAUSE ADDICTION, OVERDOSE, OR DEATH. Keep the medication in a place where others cannot get to it. Taking opioid medicine during pregnancy may cause life-threatening withdrawal symptoms in the . Fatal side effects can occur if you use opioid medicine with alcohol, or with other drugs that cause drowsiness or slow your breathing. Stop taking this medicine and call your doctor right away if you have skin redness or a rash that spreads and causes blistering and peeling. What is acetaminophen and hydrocodone? Acetaminophen and hydrocodone is a combination medicine used to relieve moderate to severe pain. Acetaminophen and hydrocodone contains an opioid medicine, and may be habit-forming. Acetaminophen and hydrocodone may also be used for purposes not listed in this medication guide. What should I discuss with my healthcare provider before taking acetaminophen and hydrocodone? You should not use this medicine if you are allergic to acetaminophen or hydrocodone, or if you have:  severe asthma or breathing problems; or  a blockage in your stomach or intestines.   Tell your doctor if you have ever had:  breathing problems, sleep apnea (breathing stops during sleep);  liver disease;  a drug or alcohol addiction;  kidney disease;  a head injury or seizures;  urination problems; or  problems with your thyroid, almost time for your next dose. Do not use two doses at one time. What happens if I overdose? Seek emergency medical attention or call the Poison Help line at 1-457.692.4877. An overdose of this medicine can be fatal, especially in a child or other person using the medicine without a prescription. Overdose symptoms may include nausea, vomiting, sweating, severe drowsiness, pinpoint pupils, slow breathing, or no breathing. Your doctor may recommend you get naloxone (a medicine to reverse an opioid overdose) and keep it with you at all times. A person caring for you can give the naloxone if you stop breathing or don't wake up. Your caregiver must still get emergency medical help and may need to perform CPR (cardiopulmonary resuscitation) on you while waiting for help to arrive. Anyone can buy naloxone from a pharmacy or local health department. Make sure any person caring for you knows where you keep naloxone and how to use it. What should I avoid while taking acetaminophen and hydrocodone? Avoid driving or operating machinery until you know how this medicine will affect you. Dizziness or drowsiness can cause falls, accidents, or severe injuries. Do not drink alcohol. Dangerous side effects or death could occur. Ask a doctor or pharmacist before using any other medicine that may contain acetaminophen (sometimes abbreviated as APAP). Taking certain medications together can lead to a fatal overdose. What are the possible side effects of acetaminophen and hydrocodone? Get emergency medical help if you have signs of an allergic reaction: hives; difficulty breathing; swelling of your face, lips, tongue, or throat. Opioid medicine can slow or stop your breathing, and death may occur. A person caring for you should give naloxone and/or seek emergency medical attention if you have slow breathing with long pauses, blue colored lips, or if you are hard to wake up.   In rare cases, acetaminophen may cause a severe skin reaction that can be fatal. This could occur even if you have taken acetaminophen in the past and had no reaction. Stop taking this medicine and call your doctor right away if you have skin redness or a rash that spreads and causes blistering and peeling. Call your doctor at once if you have:  noisy breathing, sighing, shallow breathing, breathing that stops;  a light-headed feeling, like you might pass out;  liver problems --nausea, upper stomach pain, tiredness, loss of appetite, dark urine, pennie-colored stools, jaundice (yellowing of the skin or eyes);  low cortisol levels -- nausea, vomiting, loss of appetite, dizziness, worsening tiredness or weakness; o  high levels of serotonin in the body --agitation, hallucinations, fever, sweating, shivering, fast heart rate, muscle stiffness, twitching, loss of coordination, nausea, vomiting, diarrhea. Serious breathing problems may be more likely in older adults and in those who are debilitated or have wasting syndrome or chronic breathing disorders. Common side effects include:  dizziness, drowsiness, feeling tired;  nausea, vomiting, stomach pain;  constipation; or  headache. This is not a complete list of side effects and others may occur. Call your doctor for medical advice about side effects. You may report side effects to FDA at 7-025-FDA-1655. What other drugs will affect acetaminophen and hydrocodone? You may have breathing problems or withdrawal symptoms if you start or stop taking certain other medicines. Tell your doctor if you also use an antibiotic, antifungal medication, heart or blood pressure medication, seizure medication, or medicine to treat HIV or hepatitis C. Opioid medication can interact with many other drugs and cause dangerous side effects or death.  Be sure your doctor knows if you also use:  cold or allergy medicines, bronchodilator asthma/COPD medication, or a diuretic (\"water pill\");  medicines for motion sickness, irritable bowel syndrome, or overactive bladder;  other opioids --opioid pain medicine or prescription cough medicine;  a sedative like Valium --diazepam, alprazolam, lorazepam, Xanax, Klonopin, Versed, and others;  drugs that make you sleepy or slow your breathing --a sleeping pill, muscle relaxer, medicine to treat mood disorders or mental illness;  drugs that affect serotonin levels in your body --a stimulant, or medicine for depression, Parkinson's disease, migraine headaches, serious infections, or nausea and vomiting. This list is not complete. Other drugs may affect acetaminophen and hydrocodone, including prescription and over-the-counter medicines, vitamins, and herbal products. Not all possible interactions are listed here. Where can I get more information? Your doctor or pharmacist can provide more information about acetaminophen and hydrocodone. Remember, keep this and all other medicines out of the reach of children, never share your medicines with others, and use this medication only for the indication prescribed. Every effort has been made to ensure that the information provided by [address] is accurate, up-to-date, and complete, but no guarantee is made to that effect. Drug information contained herein may be time sensitive. [facility] information has been compiled for use by healthcare practitioners and consumers in the [place] and therefore [facility] does not warrant that uses outside of the [place] are appropriate, unless specifically indicated otherwise. [facility]'s drug information does not endorse drugs, diagnose patients or recommend therapy. [facility] drug information is an informational resource designed to assist licensed healthcare practitioners in caring for their patients and/or to serve consumers viewing this service as a supplement to, and not a substitute for, the expertise, skill, knowledge and judgment of healthcare practitioners.  The absence of a warning for a given drug or drug combination in no way should be construed to indicate that the drug or drug combination is safe, effective or appropriate for any given patient. [facility] does not assume any responsibility for any aspect of healthcare administered with the aid of information [facility] provides. The information contained herein is not intended to cover all possible uses, directions, precautions, warnings, drug interactions, allergic reactions, or adverse effects. If you have questions about the drugs you are taking, check with your doctor, nurse or pharmacist.  Copyright 1877-2048 [address] [place] [address] Dr BANKS. Version: 16.03. Revision date: 2/2/2021. Care instructions adapted under license by [facility] ([facility]). If you have questions about a medical condition or this instruction, always ask your healthcare professional. [address] any warranty or liability for your use of this information.

## 2022-11-26 NOTE — PROGRESS NOTES
Sitting up in the chair at the bedside resting and watching TV, pt is alert and oriented and denies pain or other needs at this time. Assessment done, VSS, call light in reach, will continue to monitor.

## 2022-11-26 NOTE — PROGRESS NOTES
Pharmacy to Dose Warfarin    Pharmacy consulted to dose warfarin for aortic valve replacement. INR Goal: 2.5-3.5    INR today: 1.97    Assessment/Plan:  - INR subtherapeutic and trending down after holding warfarin on admission  - Will give boost of 10 mg tonight instead of home 5 mg dose  - Will discuss bridging with Dr. Verena Srivastava until INR >2.5    Pharmacy will continue to follow.     Christoph Curtis, PharmD, [facility]  Clinical Pharmacist  [id number]

## 2022-11-26 NOTE — PROGRESS NOTES
Rosalva Li Friend III is a 45 y.o. male patient.     CC-LLQ pain    HPI-50 year old male seen in follow up for diverticulitis  Current Facility-Administered Medications   Medication Dose Route Frequency Provider Last Rate Last Admin    warfarin (COUMADIN) tablet 10 mg  10 mg Oral Once MD Sukh Alvarez Faustojyotsna ON 11/27/2022] warfarin (COUMADIN) tablet 5 mg  5 mg Oral Once per day on Sun Tue Thu Sat Liam Domingo MD        And    [START ON 11/28/2022] warfarin (COUMADIN) tablet 10 mg  10 mg Oral Once per day on Mon Wed Fri Liam Domingo MD        oxyCODONE-acetaminophen (PERCOCET) 5-325 MG per tablet 1 tablet  1 tablet Oral Q4H PRN Liam Domingo MD        Or    oxyCODONE-acetaminophen (PERCOCET) 5-325 MG per tablet 2 tablet  2 tablet Oral Q4H PRN Liam Domingo MD   2 tablet at 11/26/22 0203    polyethylene glycol (GLYCOLAX) packet 17 g  17 g Oral Daily PRN Liam Domingo MD   17 g at 11/25/22 1206    ketorolac (TORADOL) injection 30 mg  30 mg IntraVENous Q6H PRN Liam Domingo MD   30 mg at 11/26/22 0107    sodium chloride flush 0.9 % injection 5-40 mL  5-40 mL IntraVENous 2 times per day Liam Domingo MD   10 mL at 11/25/22 2153    sodium chloride flush 0.9 % injection 5-40 mL  5-40 mL IntraVENous PRN Liam Domingo MD        0.9 % sodium chloride infusion   IntraVENous PRN Liam Domingo MD 50 mL/hr at 11/26/22 0626 New Bag at 11/26/22 0626    HYDROmorphone HCl PF (DILAUDID) injection 1 mg  1 mg IntraVENous Q2H PRN Liam Domingo MD   1 mg at 11/25/22 1001    allopurinol (ZYLOPRIM) tablet 300 mg  300 mg Oral Daily Liam Domingo MD   300 mg at 11/25/22 0816    aspirin EC tablet 81 mg  81 mg Oral Daily Liam Domingo MD   81 mg at 11/25/22 0816    metoprolol tartrate (LOPRESSOR) tablet 25 mg  25 mg Oral BID Liam Domingo MD   25 mg at 11/25/22 2147    atorvastatin (LIPITOR) tablet 40 mg  40 mg Oral Nightly Jenni Mitchell Tj Cade MD   40 mg at 11/25/22 2147    ondansetron [facility]) injection 4 mg  4 mg IntraVENous Q6H PRN Debby Tomlin MD        piperacillin-tazobactam (ZOSYN) 3,375 mg in dextrose 5 % 50 mL IVPB (mini-bag)  3,375 mg IntraVENous Q8H Debby Tomlin MD 12.5 mL/hr at 11/26/22 0628 3,375 mg at 11/26/22 8569     No Known Allergies  Principal Problem:    Sigmoid diverticulitis  Resolved Problems:    * No resolved hospital problems. *    Blood pressure 139/78, pulse 63, temperature 97.5 °F (36.4 °C), temperature source Oral, resp. rate 16, height 5' 11\" (1.803 m), weight 262 lb (118.8 kg), SpO2 96 %. Subjective:  Symptoms:  Improved. Diet:  Adequate intake. Activity level: Returning to normal.    Pain:  He complains of pain that is moderate. Objective:  General Appearance:  Comfortable. Vital signs: (most recent): Blood pressure 139/78, pulse 63, temperature 97.5 °F (36.4 °C), temperature source Oral, resp. rate 16, height 5' 11\" (1.803 m), weight 262 lb (118.8 kg), SpO2 96 %. Output: Producing urine and producing stool. Lungs:  Normal effort and normal respiratory rate. Abdomen: Abdomen is soft and non-distended. There is left lower quadrant tenderness. Neurological: Patient is alert and oriented to person, place and time. Skin:  Warm and dry. Assessment & Plan 45year old male seen in follow up for sigmoid diverticulitis. Doing better today. Tolerating diet. Having bowel function. Pain better. Discharge home today.        Debby Tomlin MD  11/26/2022

## 2022-11-26 NOTE — PROGRESS NOTES
IV removed, pt given d/c instructions, education, 2 prescriptions which he will take to his pharmacy to have filled and pt taken to main lobby in wheelchair to be taken home by private vehicle.

## 2022-11-26 NOTE — PROGRESS NOTES
2147: Assessment complete, VSS, BS active, reports BM today, tolerating regular diet, still having lower abd pain, gave percocet, see MAR, discussed care plan, pt mutually agrees, call light and belongings in reach, no other needs expressed at this time.     0107: pt c/o increasing abd pain after up to the BR, gave toradol, see MAR.    0203: percocet for continued abd pain, see MAR Corinne Sons, RN

## 2022-11-27 NOTE — DISCHARGE SUMMARY
[address]                     [address] [facility], [address] [place] [address] Drive                               DISCHARGE SUMMARY    PATIENT NAME: Ingris Marin              :        1970  MED REC NO:   [id number]                          ROOM:       [id number]  ACCOUNT NO:   [de-identified]                           ADMIT DATE: 2022  PROVIDER:     Mellisa Marie MD                  DISCHARGE DATE:  2022    ADMITTING DIAGNOSIS:  Diverticulitis. DISCHARGE DIAGNOSIS:  Diverticulitis. CONDITION UPON DISCHARGE:  Stable. HOSPITAL COURSE:  The patient is a 57-year-old male who was admitted to  the emergency room three days ago with left lower quadrant abdominal  pain. CAT scan of the abdomen and pelvis showed diverticulitis of the  distal descending colon. The patient was admitted to the hospital,  started on IV antibiotics. His diet was slowly advanced. His pain  improved to where he is able to be discharged home on 2022. His  condition upon discharge was stable. He was given a prescription for  Norco as needed for pain as well as Augmentin 875 mg p.o. b.i.d. for  seven days. He will resume all home medications including  anticoagulation and follow up with me in the office this week. Neo Antony.  Sindhu Mitchell MD    D: 2022 9:24:36       T: 2022 9:28:05     [email or web address]  Job#: [id number]     Doc#: [id number]    CC:

## 2022-11-29 ENCOUNTER — ANTI-COAG VISIT (OUTPATIENT)
Dept: PHARMACY | Age: 52
End: 2022-11-29
Payer: COMMERCIAL

## 2022-11-29 DIAGNOSIS — Z95.2 S/P AVR (AORTIC VALVE REPLACEMENT): Primary | ICD-10-CM

## 2022-11-29 DIAGNOSIS — Z79.01 LONG TERM (CURRENT) USE OF ANTICOAGULANTS: ICD-10-CM

## 2022-11-29 LAB — INTERNATIONAL NORMALIZATION RATIO, POC: 5.5

## 2022-11-29 PROCEDURE — 99212 OFFICE O/P EST SF 10 MIN: CPT

## 2022-11-29 PROCEDURE — 85610 PROTHROMBIN TIME: CPT

## 2022-11-29 NOTE — PROGRESS NOTES
Mr. Jaelyn Hou is a 45 y.o. y/o male with history of Heart Valve Replacement who presents today for anticoagulation monitoring and adjustment. Pertinent PMH: HTN, PVD,   Pt allergic to sun so will be on prednisone in the summer   Pt has been on warfarin since ~2015  Hx pt of [facility] clinic.    Patient Reported Findings:  Yes     No  [x]   []       Patient verifies current dosing regimen as listed pt normal weekly dose has been 10 mg on Mon and Fri and 5 mg all other days of the week ---> confirms dose, used AVS   []   [x]       S/S bleeding/bruising/swelling/SOB- denies   []   [x]       Blood in urine or stool- denies   []   [x]       Procedures scheduled in the future at this time needs to have hernia surgery, but was not cleared by cardiology so r/s---> got cleared, procedure not scheduled, will have to do lovenox shots and will hold 5 days (November 2)--> none upcoming   []   [x]       Missed Dose - held 2 days in hospital    []   [x]       Extra Dose - 10 mg daily since Sat    []   [x]       Change in medications Takes prednisone tapers through summer months when has flare up of reaction (40 mg taper x 16 days) --> started Allopurinol 3 weeks ago, ran out of lisinopril --> still taking allopurinol---> no changes but will have a pred taper end of May before vacation --> resumed prednisone so will be on for 2 weeks---> done with prednisone for now (thinks will have another round but will call us when that is), had a few OTC ASA for HA recently---> started pred taper on 9/8 for 2 weeks---> finished last week Wed he thinks, no other changes --> no changes---> Augmentin for 7 days, has several days left, pain meds       [x]   []       Change in health/diet/appetite states that he will eat vit k occasionally every few weeks --> eating greens every day (green beans, lettuce, asparagus, broccoli) states that he has been doing this for long time --> maybe more greens, no NVD ---> less greens, no NVD---> no greens, no NVD --> eating a lot of greens, oatmeal, salads --> sporadic vit k ---> clear liquid diet, no greens, no NVD  [x]   []       Change in alcohol use stopped drinking alcohol for a few weeks --> no changes --> no alcohol for a few weeks d/t gout --> had one beer beginning of year and caused another gout attack --> had more alcohol while was on vacation-->normal amounts--> less amounts of EtOH, no alcohol in 9 days or so, normally 3-4 drinks 4 nights a week. --> no changes, will continue---> still not drinking    []   []       Change in activity  []   [x]       Hospital admission  - for diverticulitis, held 2 days, then 10mg daily, no lovenox   []   [x]       Emergency department visit  []   [x]       Other complaints      Clinical Outcomes:  Yes     No  []   [x]       Major bleeding event  []   [x]       Thromboembolic event  Uses 5 mg tablets   Duration of warfarin Therapy: indefinite  INR Range:  2.5-3.5      INR is 5.5 today after being hospitalized for diverticulitis, getting abx, pain meds, and not eating any greens. Has a few days left of abx, getting back to normal diet, and hopefully weaning pain meds. Since INR jumped dt boosted doses post discharge (self adjusted to prevent lovenox), will hold 2 days and decrease dose and continue to monitor closely. Hold for 2 days then take 5mg daily   Encouraged to maintain a consistency of vegetables/salads.     Return to clinic in 1 week,     **consent form signed 11/15/2021    Referring Dr. Venice Marte   INR (no units)   Date Value   2022 1.97 (H)   2022 2.23 (H)   2022 1.00   2020 3.29 (H)     INR,(POC) (no units)   Date Value   2022 2.3   11/15/2022 2.4   2022 2.0   2022 1.3     For Pharmacy Admin Tracking Only    Intervention Detail: Adherence Monitorin and Dose Adjustment: 1, reason: Therapy Optimization  Total # of Interventions Recommended: 2  Total # of Interventions Accepted: 2  Time Spent (min): 15

## 2022-12-01 ENCOUNTER — OFFICE VISIT (OUTPATIENT)
Dept: SURGERY | Age: 52
End: 2022-12-01

## 2022-12-01 VITALS — DIASTOLIC BLOOD PRESSURE: 70 MMHG | SYSTOLIC BLOOD PRESSURE: 100 MMHG | BODY MASS INDEX: 37.66 KG/M2 | WEIGHT: 270 LBS

## 2022-12-01 DIAGNOSIS — K43.9 VENTRAL HERNIA WITHOUT OBSTRUCTION OR GANGRENE: Primary | ICD-10-CM

## 2022-12-01 PROCEDURE — 99024 POSTOP FOLLOW-UP VISIT: CPT | Performed by: SURGERY

## 2022-12-01 RX ORDER — HYDROCODONE BITARTRATE AND ACETAMINOPHEN 5; 325 MG/1; MG/1
1-2 TABLET ORAL EVERY 6 HOURS PRN
Qty: 18 TABLET | Refills: 0 | Status: SHIPPED | OUTPATIENT
Start: 2022-12-01 | End: 2022-12-04

## 2022-12-01 NOTE — PROGRESS NOTES
Subjective:      Patient ID: Oksana Rubio III is a 45 y.o. male. HPI    Review of Systems    Objective:   Physical Exam    Assessment:      58-year-old male status post robotic laparoscopic ventral hernia repair with mesh on 11/2/2022. He required a hospital admission on 11/23/2022 for diverticulitis of the descending colon. He has 2 more days until he finishes his course of oral antibiotics. He is still in quite a bit of pain above the umbilicus and in the left lower quadrant of the abdomen. The pain seems to be musculoskeletal.  It is difficult for him to stand up straight. His abdomen is appropriately tender. There is no evidence of infection. CAT scan from his most recent hospitalization showed that the hernia was healing well.       Plan:      Finish course of oral antibiotics  Follow-up in 1 week  Contact the office sooner if needed        Otilia Singleton MD

## 2022-12-05 ENCOUNTER — TELEPHONE (OUTPATIENT)
Dept: PHARMACY | Age: 52
End: 2022-12-05

## 2022-12-05 ENCOUNTER — APPOINTMENT (OUTPATIENT)
Dept: PHARMACY | Age: 52
End: 2022-12-05
Payer: COMMERCIAL

## 2022-12-05 NOTE — TELEPHONE ENCOUNTER
Patient called to say he has had a \"set-back\" with his herina surgery and will call back to reschedule after he speaks to his surgeon.

## 2022-12-06 ENCOUNTER — TELEPHONE (OUTPATIENT)
Dept: SURGERY | Age: 52
End: 2022-12-06

## 2022-12-06 NOTE — TELEPHONE ENCOUNTER
Spoke to PT this morning and he had an appointment with  but because symptoms and pain was getting worse, PT was advised to go to ER

## 2022-12-06 NOTE — TELEPHONE ENCOUNTER
PATIENT STATES VICODIN IS NOT HELPING WITH THE PAIN. BELIEVES IT COULD BE RELATED TO NERVE ENDINGS.  HAS APPOINTMENT ON 12-9. [phone]

## 2022-12-09 ENCOUNTER — OFFICE VISIT (OUTPATIENT)
Dept: SURGERY | Age: 52
End: 2022-12-09

## 2022-12-09 VITALS — SYSTOLIC BLOOD PRESSURE: 120 MMHG | WEIGHT: 266 LBS | DIASTOLIC BLOOD PRESSURE: 78 MMHG | BODY MASS INDEX: 37.1 KG/M2

## 2022-12-09 DIAGNOSIS — K43.9 VENTRAL HERNIA WITHOUT OBSTRUCTION OR GANGRENE: Primary | ICD-10-CM

## 2022-12-09 PROCEDURE — 99024 POSTOP FOLLOW-UP VISIT: CPT | Performed by: SURGERY

## 2022-12-09 NOTE — LETTER
[facility]  [address] [address]  Phone: [phone]  Fax: [phone]    December 9, 2022    Patient: Ofelia Santos  MRN:  [id number]  YOB: 1970  Date of Visit: 12/9/2022    Dear Dr Pérez Nine:    Thank you for the request for consultation for [facility]. Below are the relevant portions of my assessment and plan of care. Assessment:  57-year-old male status post robotic laparoscopic ventral hernia repair with mesh on 11/2/2022. He developed left-sided abdominal pain postoperatively. CAT scan showed mild diverticulitis of the descending colon. He was treated with a course of antibiotics. His symptoms have now completely resolved. It has been 8 years since his last colonoscopy. Plan:  Recommended that he pursues repeat colonoscopy with his gastroenterologist, Caro Veliz. Follow-up with me as needed      If you have questions, please do not hesitate to call me. .    Sincerely,    Tate Navarro MD    CC providers:    Lio Quintanilla MD  [address] [facility] [address] [facility] [address] [facility]  Via Fax: [phone]     Marizol Lima.  Tara Sohan \"Vikash\"  [address] [facility] [address]  [address] [facility]

## 2022-12-09 NOTE — PROGRESS NOTES
Subjective:      Patient ID: Joie Skiff III is a 45 y.o. male. HPI    Review of Systems    Objective:   Physical Exam    Abdomen soft  Incisions well-healed  Assessment:      75-year-old male status post robotic laparoscopic ventral hernia repair with mesh on 11/2/2022. He developed left-sided abdominal pain postoperatively. CAT scan showed mild diverticulitis of the descending colon. He was treated with a course of antibiotics. His symptoms have now completely resolved. It has been 8 years since his last colonoscopy. Plan:      Recommended that he pursues repeat colonoscopy with his gastroenterologist, Dandre Morillo.   Follow-up with me as needed        Jo Ann Alexis MD

## 2022-12-12 ENCOUNTER — ANTI-COAG VISIT (OUTPATIENT)
Dept: PHARMACY | Age: 52
End: 2022-12-12
Payer: COMMERCIAL

## 2022-12-12 DIAGNOSIS — Z79.01 LONG TERM (CURRENT) USE OF ANTICOAGULANTS: ICD-10-CM

## 2022-12-12 DIAGNOSIS — Z95.2 S/P AVR (AORTIC VALVE REPLACEMENT): Primary | ICD-10-CM

## 2022-12-12 LAB — INTERNATIONAL NORMALIZATION RATIO, POC: 2.8

## 2022-12-12 PROCEDURE — 85610 PROTHROMBIN TIME: CPT

## 2022-12-12 PROCEDURE — 99211 OFF/OP EST MAY X REQ PHY/QHP: CPT

## 2022-12-12 NOTE — PROGRESS NOTES
Mr. Felix Stevenson is a 45 y.o. y/o male with history of Heart Valve Replacement who presents today for anticoagulation monitoring and adjustment. Pertinent PMH: HTN, PVD,   Pt allergic to sun so will be on prednisone in the summer   Pt has been on warfarin since ~2015  Hx pt of [facility] clinic.    Patient Reported Findings:  Yes     No  [x]   []       Patient verifies current dosing regimen as listed pt normal weekly dose has been 10 mg on Mon and Fri and 5 mg all other days of the week ---> confirms dose, used AVS   []   [x]       S/S bleeding/bruising/swelling/SOB- denies   []   [x]       Blood in urine or stool- denies   []   [x]       Procedures scheduled in the future at this time needs to have hernia surgery, but was not cleared by cardiology so r/s---> got cleared, procedure not scheduled, will have to do lovenox shots and will hold 5 days (November 2)--> none upcoming   []   [x]       Missed Dose - Sun or Mon  []   [x]       Extra Dose - denies  []   [x]       Change in medications Takes prednisone tapers through summer months when has flare up of reaction (40 mg taper x 16 days) --> started Allopurinol 3 weeks ago, ran out of lisinopril --> still taking allopurinol---> no changes but will have a pred taper end of May before vacation --> resumed prednisone so will be on for 2 weeks---> done with prednisone for now (thinks will have another round but will call us when that is), had a few OTC ASA for HA recently---> started pred taper on 9/8 for 2 weeks---> finished last week Wed he thinks, no other changes --> no changes---> Augmentin for 7 days, has several days left, pain meds---> feeling much better so not using pain meds, off lisinopril  [x]   []       Change in health/diet/appetite states that he will eat vit k occasionally every few weeks --> eating greens every day (green beans, lettuce, asparagus, broccoli) states that he has been doing this for long time --> maybe more greens, no NVD ---> less greens, no NVD---> no greens, no NVD --> eating a lot of greens, oatmeal, salads --> sporadic vit k ---> clear liquid diet, no greens, no NVD---> back to normal diet, no NVD  [x]   []       Change in alcohol use stopped drinking alcohol for a few weeks --> no changes --> no alcohol for a few weeks d/t gout --> had one beer beginning of year and caused another gout attack --> had more alcohol while was on vacation-->normal amounts--> less amounts of EtOH, no alcohol in 9 days or so, normally 3-4 drinks 4 nights a week. --> no changes, will continue---> still not drinking ---> back to normal alcohol    []   []       Change in activity  []   [x]       Hospital admission  - for diverticulitis, held 2 days, then 10mg daily, no lovenox   []   [x]       Emergency department visit  []   [x]       Other complaints      Clinical Outcomes:  Yes     No  []   [x]       Major bleeding event  []   [x]       Thromboembolic event  Uses 5 mg tablets   Duration of warfarin Therapy: indefinite  INR Range:  2.5-3.5      Was due back . INR is 2.8 today   Pt states followed AVS until  then went back to normal schedule. May have missed  or Monday. Had alcohol yesterday. Continue taking 10mg Mon, Wed and Fri and 5mg all other days   Encouraged to maintain a consistency of vegetables/salads.     Return to clinic in 2 weeks,     **consent form signed 11/15/2021    Referring Dr. Jose Garcia   INR (no units)   Date Value   2022 1.97 (H)   2022 2.23 (H)   2022 1.00   2020 3.29 (H)     INR,(POC) (no units)   Date Value   2022 2.8   2022 5.5   2022 2.3   11/15/2022 2.4     For Pharmacy Admin Tracking Only    Intervention Detail: Adherence Monitorin and Dose Adjustment: 1, reason: Therapy Optimization  Total # of Interventions Recommended: 2  Total # of Interventions Accepted: 2  Time Spent (min): 15

## 2022-12-27 ENCOUNTER — ANTI-COAG VISIT (OUTPATIENT)
Dept: PHARMACY | Age: 52
End: 2022-12-27
Payer: COMMERCIAL

## 2022-12-27 DIAGNOSIS — Z95.2 S/P AVR (AORTIC VALVE REPLACEMENT): Primary | ICD-10-CM

## 2022-12-27 DIAGNOSIS — Z79.01 LONG TERM (CURRENT) USE OF ANTICOAGULANTS: ICD-10-CM

## 2022-12-27 LAB — INTERNATIONAL NORMALIZATION RATIO, POC: 5.4

## 2022-12-27 PROCEDURE — 99212 OFFICE O/P EST SF 10 MIN: CPT

## 2022-12-27 PROCEDURE — 85610 PROTHROMBIN TIME: CPT

## 2022-12-27 NOTE — PROGRESS NOTES
Mr. Jyoti Coronado is a 45 y.o. y/o male with history of Heart Valve Replacement who presents today for anticoagulation monitoring and adjustment. Pertinent PMH: HTN, PVD,   Pt allergic to sun so will be on prednisone in the summer   Pt has been on warfarin since ~2015  Hx pt of [facility] clinic.    Patient Reported Findings:  Yes     No  [x]   []       Patient verifies current dosing regimen as listed pt normal weekly dose has been 10 mg on Mon and Fri and 5 mg all other days of the week ---> confirms dose, used AVS   []   [x]       S/S bleeding/bruising/swelling/SOB- denies   []   [x]       Blood in urine or stool- denies   []   [x]       Procedures scheduled in the future at this time needs to have hernia surgery, but was not cleared by cardiology so r/s---> got cleared, procedure not scheduled, will have to do lovenox shots and will hold 5 days (November 2)--> none upcoming   [x]   []       Missed Dose - took 5 mg on Wed (self adjusted for alcohol intake)  []   [x]       Extra Dose - denies  [x]   []       Change in medications Takes prednisone tapers through summer months when has flare up of reaction (40 mg taper x 16 days) --> started Allopurinol 3 weeks ago, ran out of lisinopril --> still taking allopurinol---> no changes but will have a pred taper end of May before vacation --> resumed prednisone so will be on for 2 weeks---> done with prednisone for now (thinks will have another round but will call us when that is), had a few OTC ASA for HA recently---> started pred taper on 9/8 for 2 weeks---> finished last week Wed he thinks, no other changes --> no changes---> Augmentin for 7 days, has several days left, pain meds---> feeling much better so not using pain meds, off lisinopril --> took a few ibuprofen for headache   [x]   []       Change in health/diet/appetite states that he will eat vit k occasionally every few weeks --> eating greens every day (green beans, lettuce, asparagus, broccoli) states that he has been doing this for long time --> maybe more greens, no NVD ---> less greens, no NVD---> no greens, no NVD --> eating a lot of greens, oatmeal, salads --> sporadic vit k ---> clear liquid diet, no greens, no NVD---> back to normal diet, no NVD --> no vit k recently   [x]   []       Change in alcohol use stopped drinking alcohol for a few weeks --> no changes --> no alcohol for a few weeks d/t gout --> had one beer beginning of year and caused another gout attack --> had more alcohol while was on vacation-->normal amounts--> less amounts of EtOH, no alcohol in 9 days or so, normally 3-4 drinks 4 nights a week. --> no changes, will continue---> still not drinking ---> back to normal alcohol --> had more alcohol than normal over the holiday   []   []       Change in activity  []   [x]       Hospital admission  11/23-11/26 for diverticulitis, held 2 days, then 10mg daily, no lovenox   []   [x]       Emergency department visit  []   [x]       Other complaints      Clinical Outcomes:  Yes     No  []   [x]       Major bleeding event  []   [x]       Thromboembolic event  Uses 5 mg tablets   Duration of warfarin Therapy: indefinite  INR Range:  2.5-3.5      Was due back 12/5. INR is 5.4 today   After more alcohol and no vit k, also self adjusted dose last week    Hold dose tonight and tomorrow then decrease weekly dose to taking 10mg Mon and Fri and 5mg all other days   Encouraged to maintain a consistency of vegetables/salads.     Return to clinic in 2 weeks, 1/9/23    **consent form signed 11/15/2021    Referring Dr. Madiha Dyer   INR (no units)   Date Value   11/26/2022 1.97 (H)   11/25/2022 2.23 (H)   11/02/2022 1.00   04/30/2020 3.29 (H)     INR,(POC) (no units)   Date Value   12/12/2022 2.8   11/29/2022 5.5   11/21/2022 2.3   11/15/2022 2.4     For Pharmacy Admin Tracking Only    Intervention Detail: Dose Adjustment: 1, reason: Therapy Optimization  Total # of Interventions Recommended: 1  Total # of Interventions Accepted: 1  Time Spent (min): 15

## 2023-01-09 ENCOUNTER — TELEPHONE (OUTPATIENT)
Dept: PHARMACY | Age: 53
End: 2023-01-09

## 2023-01-09 ENCOUNTER — TELEPHONE (OUTPATIENT)
Dept: CARDIOLOGY CLINIC | Age: 53
End: 2023-01-09

## 2023-01-09 NOTE — TELEPHONE ENCOUNTER
Called patient, advised him to contact pcp for tx for covid-19, and call us if medications prescribed so that we can advise when to get protime

## 2023-01-09 NOTE — TELEPHONE ENCOUNTER
Pt called in stating that he tested positive for COVID on 1/8/2023 and he would like to know if he is a candidate for the pfizer medication.     Pt can be reached at [phone]

## 2023-01-09 NOTE — TELEPHONE ENCOUNTER
Called patient he was started on amoxicillen and prednisone by pcp, discussed with dkw, needs a follow up protime tomorrow.   Patient will call protime clinic, notify them of situation, and get scheduled for an INR

## 2023-01-09 NOTE — TELEPHONE ENCOUNTER
Patient LVM cancelling his appt. States he will call back to r/s. Patient & wife tested + for COVID.

## 2023-01-13 ENCOUNTER — ANTI-COAG VISIT (OUTPATIENT)
Dept: PHARMACY | Age: 53
End: 2023-01-13
Payer: COMMERCIAL

## 2023-01-13 DIAGNOSIS — Z95.2 S/P AVR (AORTIC VALVE REPLACEMENT): Primary | ICD-10-CM

## 2023-01-13 DIAGNOSIS — Z79.01 LONG TERM (CURRENT) USE OF ANTICOAGULANTS: ICD-10-CM

## 2023-01-13 LAB — INTERNATIONAL NORMALIZATION RATIO, POC: 2

## 2023-01-13 PROCEDURE — 99211 OFF/OP EST MAY X REQ PHY/QHP: CPT

## 2023-01-13 PROCEDURE — 85610 PROTHROMBIN TIME: CPT

## 2023-01-13 NOTE — PROGRESS NOTES
Mr. Zabrina Gann is a 45 y.o. y/o male with history of Heart Valve Replacement who presents today for anticoagulation monitoring and adjustment. Pertinent PMH: HTN, PVD,   Pt allergic to sun so will be on prednisone in the summer   Pt has been on warfarin since ~2015  Hx pt of [facility] clinic.    Patient Reported Findings:  Yes     No  [x]   []       Patient verifies current dosing regimen as listed pt normal weekly dose has been 10 mg on Mon and Fri and 5 mg all other days of the week ---> confirms dose, used AVS   []   [x]       S/S bleeding/bruising/swelling/SOB- denies   []   [x]       Blood in urine or stool- denies   []   [x]       Procedures scheduled in the future at this time needs to have hernia surgery, but was not cleared by cardiology so r/s---> got cleared, procedure not scheduled, will have to do lovenox shots and will hold 5 days (November 2)--> none upcoming   []   [x]       Missed Dose - denies   []   [x]       Extra Dose - denies  [x]   []       Change in medications Takes prednisone tapers through summer months when has flare up of reaction (40 mg taper x 16 days) --> started Allopurinol 3 weeks ago, ran out of lisinopril --> still taking allopurinol---> no changes but will have a pred taper end of May before vacation --> resumed prednisone so will be on for 2 weeks---> done with prednisone for now (thinks will have another round but will call us when that is), had a few OTC ASA for HA recently---> started pred taper on 9/8 for 2 weeks---> finished last week Wed he thinks, no other changes --> no changes---> Augmentin for 7 days, has several days left, pain meds---> feeling much better so not using pain meds, off lisinopril --> took a few ibuprofen for headache--->Amox and pred for a few days but had reaction so stopped    [x]   []       Change in health/diet/appetite states that he will eat vit k occasionally every few weeks --> eating greens every day (green beans, lettuce, asparagus, broccoli) states that he has been doing this for long time --> maybe more greens, no NVD ---> less greens, no NVD---> no greens, no NVD --> eating a lot of greens, oatmeal, salads --> sporadic vit k ---> clear liquid diet, no greens, no NVD---> back to normal diet, no NVD --> no vit k recently---> had broccoli casserole a few times, had NVD with COVID but better now    [x]   []       Change in alcohol use stopped drinking alcohol for a few weeks --> no changes --> no alcohol for a few weeks d/t gout --> had one beer beginning of year and caused another gout attack --> had more alcohol while was on vacation-->normal amounts--> less amounts of EtOH, no alcohol in 9 days or so, normally 3-4 drinks 4 nights a week. --> no changes, will continue---> still not drinking ---> back to normal alcohol --> had more alcohol than normal over the holiday---> none with COVID    []   []       Change in activity  []   [x]       Hospital admission  11/23-11/26 for diverticulitis, held 2 days, then 10mg daily, no lovenox   []   [x]       Emergency department visit  []   [x]       Other complaints      Clinical Outcomes:  Yes     No  []   [x]       Major bleeding event  []   [x]       Thromboembolic event  Uses 5 mg tablets   Duration of warfarin Therapy: indefinite  INR Range:  2.5-3.5      COVID positive about a week ago (1/8), starting to feel better. INR is 2.0 today after having COVID/NVD, eating more greens, and no alcohol. Dose decreased at last visit. Since today's his 10mg dose, will not boost further but will reassess dose decrease at next visit. Take 10mg Mon and Fri and 5mg all other days. Encouraged to maintain a consistency of vegetables/salads.     Return to clinic in 10 days, 12/23     Consent form signed 1/13/2023    Referring Dr. Fernando Salmeron   INR (no units)   Date Value   11/26/2022 1.97 (H)   11/25/2022 2.23 (H)   11/02/2022 1.00   04/30/2020 3.29 (H)     INR,(POC) (no units)   Date Value 01/13/2023 2.0   12/27/2022 5.4   12/12/2022 2.8   11/29/2022 5.5   For Pharmacy Admin Tracking Only    Total # of Interventions Recommended: 0  Total # of Interventions Accepted: 0  Time Spent (min): 15

## 2023-01-23 ENCOUNTER — TELEPHONE (OUTPATIENT)
Dept: PHARMACY | Age: 53
End: 2023-01-23

## 2023-02-17 ENCOUNTER — ANTI-COAG VISIT (OUTPATIENT)
Dept: PHARMACY | Age: 53
End: 2023-02-17
Payer: COMMERCIAL

## 2023-02-17 DIAGNOSIS — Z95.2 S/P AVR (AORTIC VALVE REPLACEMENT): Primary | ICD-10-CM

## 2023-02-17 DIAGNOSIS — Z79.01 LONG TERM (CURRENT) USE OF ANTICOAGULANTS: ICD-10-CM

## 2023-02-17 LAB — INTERNATIONAL NORMALIZATION RATIO, POC: 3

## 2023-02-17 PROCEDURE — 99211 OFF/OP EST MAY X REQ PHY/QHP: CPT

## 2023-02-17 PROCEDURE — 85610 PROTHROMBIN TIME: CPT

## 2023-02-17 NOTE — PROGRESS NOTES
Mr. Deysi Nunez is a 52 y.o. y/o male with history of Heart Valve Replacement who presents today for anticoagulation monitoring and adjustment. Pertinent PMH: HTN, PVD,   Pt allergic to sun so will be on prednisone in the summer   Pt has been on warfarin since ~2015  Hx pt of [facility] [place] [facility].    Patient Reported Findings:  Yes     No  [x]   []       Patient verifies current dosing regimen as listed pt normal weekly dose has been 10 mg on Mon and Fri and 5 mg all other days of the week ---> confirms dose, used AVS   []   [x]       S/S bleeding/bruising/swelling/SOB- denies   []   [x]       Blood in urine or stool- denies   []   [x]       Procedures scheduled in the future at this time needs to have hernia surgery, but was not cleared by cardiology so r/s---> got cleared, procedure not scheduled, will have to do lovenox shots and will hold 5 days (November 2)--> none upcoming   []   [x]       Missed Dose - denies   []   [x]       Extra Dose - took Wed's dose late   [x]   []       Change in medications Takes prednisone tapers through summer months when has flare up of reaction (40 mg taper x 16 days) --> started Allopurinol 3 weeks ago, ran out of lisinopril --> still taking allopurinol---> no changes but will have a pred taper end of May before vacation --> resumed prednisone so will be on for 2 weeks---> done with prednisone for now (thinks will have another round but will call us when that is), had a few OTC ASA for HA recently---> started pred taper on 9/8 for 2 weeks---> finished last week Wed he thinks, no other changes --> no changes---> Augmentin for 7 days, has several days left, pain meds---> feeling much better so not using pain meds, off lisinopril --> took a few ibuprofen for headache--->Amox and pred for a few days but had reaction so stopped ---> no changes    [x]   []       Change in health/diet/appetite states that he will eat vit k occasionally every few weeks --> eating greens every day (green beans, lettuce, asparagus, broccoli) states that he has been doing this for long time --> maybe more greens, no NVD ---> less greens, no NVD---> no greens, no NVD --> eating a lot of greens, oatmeal, salads --> sporadic vit k ---> clear liquid diet, no greens, no NVD---> back to normal diet, no NVD --> no vit k recently---> had broccoli casserole a few times, had NVD with COVID but better now---> back to normal diet    [x]   []       Change in alcohol use stopped drinking alcohol for a few weeks --> no changes --> no alcohol for a few weeks d/t gout --> had one beer beginning of year and caused another gout attack --> had more alcohol while was on vacation-->normal amounts--> less amounts of EtOH, no alcohol in 9 days or so, normally 3-4 drinks 4 nights a week. --> no changes, will continue---> still not drinking ---> back to normal alcohol --> had more alcohol than normal over the holiday---> none with COVID---> back to normal alcohol     []   []       Change in activity  []   [x]       Hospital admission  11/23-11/26 for diverticulitis, held 2 days, then 10mg daily, no lovenox   []   [x]       Emergency department visit  []   [x]       Other complaints      Clinical Outcomes:  Yes     No  []   [x]       Major bleeding event  []   [x]       Thromboembolic event  Uses 5 mg tablets   Duration of warfarin Therapy: indefinite  INR Range:  2.5-3.5      INR is 3.0 today after noncompliance and being due back 1/23. Take 10mg Mon and Fri and 5mg all other days. Encouraged to maintain a consistency of vegetables/salads.     Return to clinic in 4.5 weeks, 3/21- refused sooner, did not want AVS    Consent form signed 1/13/2023    Referring Dr. Yessi Wong   INR (no units)   Date Value   11/26/2022 1.97 (H)   11/25/2022 2.23 (H)   11/02/2022 1.00   04/30/2020 3.29 (H)     INR,(POC) (no units)   Date Value   02/17/2023 3.0   01/13/2023 2.0   12/27/2022 5.4   12/12/2022 2.8   For Pharmacy Admin Tracking Only    Intervention Detail: Adherence Monitorin  Total # of Interventions Recommended: 1  Total # of Interventions Accepted: 1  Time Spent (min): 15

## 2023-03-21 ENCOUNTER — ANTI-COAG VISIT (OUTPATIENT)
Dept: PHARMACY | Age: 53
End: 2023-03-21
Payer: COMMERCIAL

## 2023-03-21 DIAGNOSIS — Z95.2 S/P AVR (AORTIC VALVE REPLACEMENT): Primary | ICD-10-CM

## 2023-03-21 DIAGNOSIS — Z79.01 LONG TERM (CURRENT) USE OF ANTICOAGULANTS: ICD-10-CM

## 2023-03-21 LAB — INTERNATIONAL NORMALIZATION RATIO, POC: 1.5

## 2023-03-21 PROCEDURE — 99212 OFFICE O/P EST SF 10 MIN: CPT

## 2023-03-21 PROCEDURE — 85610 PROTHROMBIN TIME: CPT

## 2023-03-21 NOTE — PROGRESS NOTES
Mr. Christ Bull Friend III is a 53 y.o. y/o male with history of Heart Valve Replacement who presents today for anticoagulation monitoring and adjustment.    Pertinent PMH: HTN, PVD,   Pt allergic to sun so will be on prednisone in the summer   Pt has been on warfarin since ~2015  Hx pt of [facility].   Patient Reported Findings:  Yes     No  [x]   []       Patient verifies current dosing regimen as listed pt normal weekly dose has been 10 mg on Mon and Fri and 5 mg all other days of the week ---> confirms dose, used AVS   []   [x]       S/S bleeding/bruising/swelling/SOB- denies   []   [x]       Blood in urine or stool- denies   []   [x]       Procedures scheduled in the future at this time needs to have hernia surgery, but was not cleared by cardiology so r/s---> got cleared, procedure not scheduled, will have to do lovenox shots and will hold 5 days (November 2)--> none upcoming   []   [x]       Missed Dose - denies---> Friday    []   [x]       Extra Dose - denies   [x]   []       Change in medications Takes prednisone tapers through summer months when has flare up of reaction (40 mg taper x 16 days) --> started Allopurinol 3 weeks ago, ran out of lisinopril --> still taking allopurinol---> no changes but will have a pred taper end of May before vacation --> resumed prednisone so will be on for 2 weeks---> done with prednisone for now (thinks will have another round but will call us when that is), had a few OTC ASA for HA recently---> started pred taper on 9/8 for 2 weeks---> finished last week Wed he thinks, no other changes --> no changes---> Augmentin for 7 days, has several days left, pain meds---> feeling much better so not using pain meds, off lisinopril --> took a few ibuprofen for headache--->Amox and pred for a few days but had reaction so stopped ---> colchicine and pred for 3-5 days, done now, stopped lisinopril    [x]   []       Change in health/diet/appetite states that he will eat

## 2023-04-06 ENCOUNTER — ANTI-COAG VISIT (OUTPATIENT)
Dept: PHARMACY | Age: 53
End: 2023-04-06
Payer: COMMERCIAL

## 2023-04-06 DIAGNOSIS — Z79.01 LONG TERM (CURRENT) USE OF ANTICOAGULANTS: ICD-10-CM

## 2023-04-06 DIAGNOSIS — Z95.2 S/P AVR (AORTIC VALVE REPLACEMENT): Primary | ICD-10-CM

## 2023-04-06 LAB — INTERNATIONAL NORMALIZATION RATIO, POC: 2.4

## 2023-04-06 PROCEDURE — 99211 OFF/OP EST MAY X REQ PHY/QHP: CPT

## 2023-04-06 PROCEDURE — 85610 PROTHROMBIN TIME: CPT

## 2023-04-06 NOTE — PROGRESS NOTES
diet  --> no changes   [x]   []       Change in alcohol use stopped drinking alcohol for a few weeks --> no changes --> no alcohol for a few weeks d/t gout --> had one beer beginning of year and caused another gout attack --> had more alcohol while was on vacation-->normal amounts--> less amounts of EtOH, no alcohol in 9 days or so, normally 3-4 drinks 4 nights a week. --> no changes, will continue---> still not drinking ---> back to normal alcohol --> had more alcohol than normal over the holiday---> none with COVID---> back to normal alcohol  --> no changes    []   []       Change in activity  []   [x]       Hospital admission  11/23-11/26 for diverticulitis, held 2 days, then 10mg daily, no lovenox   []   [x]       Emergency department visit  []   [x]       Other complaints      Clinical Outcomes:  Yes     No  []   [x]       Major bleeding event  []   [x]       Thromboembolic event  Uses 5 mg tablets   Duration of warfarin Therapy: indefinite  INR Range:  2.5-3.5      INR is 2.4 today   Take 7.5mg tonight then continue taking dose of 10mg Mon and Fri and 5mg all other days   Encouraged to maintain a consistency of vegetables/salads.     Return to clinic in 3.5 weeks, 5/1 d/t pt being out of town prior     Consent form signed 1/13/2023    Referring Dr. Min Fajardo   INR (no units)   Date Value   11/26/2022 1.97 (H)   11/25/2022 2.23 (H)   11/02/2022 1.00   04/30/2020 3.29 (H)     INR,(POC) (no units)   Date Value   03/21/2023 1.5   02/17/2023 3.0   01/13/2023 2.0   12/27/2022 5.4     For Pharmacy Admin Tracking Only    Intervention Detail: Dose Adjustment: 1, reason: Therapy Optimization  Total # of Interventions Recommended: 1  Total # of Interventions Accepted: 1  Time Spent (min): 15

## 2023-05-09 ENCOUNTER — TELEPHONE (OUTPATIENT)
Dept: PHARMACY | Age: 53
End: 2023-05-09

## 2023-05-11 ENCOUNTER — ANTI-COAG VISIT (OUTPATIENT)
Dept: PHARMACY | Age: 53
End: 2023-05-11
Payer: COMMERCIAL

## 2023-05-11 DIAGNOSIS — Z79.01 LONG TERM (CURRENT) USE OF ANTICOAGULANTS: ICD-10-CM

## 2023-05-11 DIAGNOSIS — Z95.2 S/P AVR (AORTIC VALVE REPLACEMENT): Primary | ICD-10-CM

## 2023-05-11 LAB — INTERNATIONAL NORMALIZATION RATIO, POC: 2.9

## 2023-05-11 PROCEDURE — 99211 OFF/OP EST MAY X REQ PHY/QHP: CPT

## 2023-05-11 PROCEDURE — 85610 PROTHROMBIN TIME: CPT

## 2023-05-11 NOTE — PROGRESS NOTES
diet  --> no changes, no NVD   [x]   []       Change in alcohol use stopped drinking alcohol for a few weeks --> no changes --> no alcohol for a few weeks d/t gout --> had one beer beginning of year and caused another gout attack --> had more alcohol while was on vacation-->normal amounts--> less amounts of EtOH, no alcohol in 9 days or so, normally 3-4 drinks 4 nights a week. --> no changes, will continue---> still not drinking ---> back to normal alcohol --> had more alcohol than normal over the holiday---> none with COVID---> back to normal alcohol  --> no changes    []   []       Change in activity  []   [x]       Hospital admission  11/23-11/26 for diverticulitis, held 2 days, then 10mg daily, no lovenox   []   [x]       Emergency department visit  []   [x]       Other complaints      Clinical Outcomes:  Yes     No  []   [x]       Major bleeding event  []   [x]       Thromboembolic event  Uses 5 mg tablets   Duration of warfarin Therapy: indefinite  INR Range:  2.5-3.5      INR is 2.9 today   Continue taking dose of 10mg Mon and Fri and 5mg all other days   Encouraged to maintain a consistency of vegetables/salads.     Return to clinic in 4 weeks, 6/12    Consent form signed 1/13/2023    Referring Dr. Salas Singletary   INR (no units)   Date Value   11/26/2022 1.97 (H)   11/25/2022 2.23 (H)   11/02/2022 1.00   04/30/2020 3.29 (H)     INR,(POC) (no units)   Date Value   04/06/2023 2.4   03/21/2023 1.5   02/17/2023 3.0   01/13/2023 2.0     For Pharmacy Admin Tracking Only    Total # of Interventions Recommended: 0  Total # of Interventions Accepted: 0  Time Spent (min): 15

## 2023-06-20 ENCOUNTER — ANTI-COAG VISIT (OUTPATIENT)
Dept: PHARMACY | Age: 53
End: 2023-06-20
Payer: COMMERCIAL

## 2023-06-20 DIAGNOSIS — Z95.2 S/P AVR (AORTIC VALVE REPLACEMENT): Primary | ICD-10-CM

## 2023-06-20 DIAGNOSIS — Z79.01 LONG TERM (CURRENT) USE OF ANTICOAGULANTS: ICD-10-CM

## 2023-06-20 LAB — INTERNATIONAL NORMALIZATION RATIO, POC: 3.4

## 2023-06-20 PROCEDURE — 85610 PROTHROMBIN TIME: CPT

## 2023-06-20 PROCEDURE — 99211 OFF/OP EST MAY X REQ PHY/QHP: CPT

## 2023-06-20 NOTE — PROGRESS NOTES
Mr. Flower Copeland is a 52 y.o. y/o male with history of Heart Valve Replacement who presents today for anticoagulation monitoring and adjustment. Pertinent PMH: HTN, PVD,   Pt allergic to sun so will be on prednisone in the summer   Pt has been on warfarin since ~2015  Hx pt of [facility] clinic.    Patient Reported Findings:  Yes     No  [x]   []       Patient verifies current dosing regimen as listed pt normal weekly dose has been 10 mg on Mon and Fri and 5 mg all other days of the week ---> confirms dose, used AVS   []   [x]       S/S bleeding/bruising/swelling/SOB- denies   []   [x]       Blood in urine or stool- denies   []   [x]       Procedures scheduled in the future at this time needs to have hernia surgery, but was not cleared by cardiology so r/s---> got cleared, procedure not scheduled, will have to do lovenox shots and will hold 5 days (November 2)--> none upcoming   [x]   []       Missed Dose - denies---> Friday --> Tuesday--->self adjusted while on pred taper    []   [x]       Extra Dose - denies   []   [x]       Change in medications Takes prednisone tapers through summer months when has flare up of reaction (40 mg taper x 16 days) --> started Allopurinol 3 weeks ago, ran out of lisinopril --> took a few ibuprofen for headache--->Amox and pred for a few days but had reaction so stopped ---> colchicine and pred for 3-5 days, done now, stopped lisinopril -->was on pred but states off for a week now   []   [x]       Change in health/diet/appetite states that he will eat vit k occasionally every few weeks --> eating greens every day (green beans, lettuce, asparagus, broccoli) states that he has been doing this for long time --> maybe more greens, no NVD ---> less greens, no NVD---> no greens, no NVD --> eating a lot of greens, oatmeal, salads --> sporadic vit k ---> clear liquid diet, no greens, no NVD---> back to normal diet, no NVD --> no vit k recently---> had broccoli casserole a

## 2023-07-18 ENCOUNTER — TELEPHONE (OUTPATIENT)
Dept: PHARMACY | Age: 53
End: 2023-07-18

## 2023-08-15 ENCOUNTER — TELEPHONE (OUTPATIENT)
Dept: CARDIOLOGY CLINIC | Age: 53
End: 2023-08-15

## 2023-08-15 RX ORDER — WARFARIN SODIUM 5 MG/1
TABLET ORAL
Qty: 38 TABLET | Refills: 0 | Status: SHIPPED | OUTPATIENT
Start: 2023-08-15

## 2023-08-15 NOTE — TELEPHONE ENCOUNTER
Spoke with patient relayed message below. Voiced understanding and he stated he would call coumadin clinic.

## 2023-08-15 NOTE — TELEPHONE ENCOUNTER
Pt has not been back to anti coag dept since last visit on 6/20/23. Kelly states multiple attempts have been made. States they are unable to refill warfarin unless pt is seen within the next 7 days in which they will be discharged. Asking if we can try reaching out to patient.

## 2023-08-30 ENCOUNTER — TELEPHONE (OUTPATIENT)
Dept: CARDIOLOGY CLINIC | Age: 53
End: 2023-08-30

## 2023-08-31 RX ORDER — WARFARIN SODIUM 5 MG/1
TABLET ORAL
Qty: 38 TABLET | Refills: 2 | OUTPATIENT
Start: 2023-08-31

## 2023-08-31 NOTE — TELEPHONE ENCOUNTER
Received refill request for Warfarin Sodium 5mg from [facility] pharmacy.      Last OV:  11-1-2022 DKW    Next OV: 10- DKW    Last Filled:  8- DKW

## 2023-09-05 ENCOUNTER — TELEPHONE (OUTPATIENT)
Dept: CARDIOLOGY CLINIC | Age: 53
End: 2023-09-05

## 2023-09-05 NOTE — TELEPHONE ENCOUNTER
Called patient at 56 12, and [phone]--CW answer, no voicemail. Called \"other phone number\"  listed- [phone]--\"wrong number. Spouse has same number listed.  Sent letter to house

## 2023-09-19 ENCOUNTER — TELEPHONE (OUTPATIENT)
Dept: CARDIOLOGY CLINIC | Age: 53
End: 2023-09-19

## 2023-09-19 NOTE — TELEPHONE ENCOUNTER
Called patient again to discuss importance of getting protime done. He agrees to go to [facility] for the protime clinic.provided number, he will call to get established there. advised to get protime asap, verbalized understanding

## 2023-09-27 ENCOUNTER — TELEPHONE (OUTPATIENT)
Dept: CARDIOLOGY CLINIC | Age: 53
End: 2023-09-27

## 2023-09-27 NOTE — TELEPHONE ENCOUNTER
Pt states Yulisa Cheung needs a PT/INR order. Please send today so he can get those drawn. Call pt once sent.  Ph [phone]

## 2023-09-27 NOTE — TELEPHONE ENCOUNTER
I spoke with coumadin clinic, pt was dismissed in June for noncompliance. I spoke with pt and asked who will be managing his warfarin. He staated that he hasn't had it checked since he was at the clinic last.  I advised that the personn who doses the warfarin will be the one to place the order. I advised him that DKW refers to the coumadin clipnic and will not be dosing the Warfarin. I told him to call the PCP and see if they will take over maintenance.  He stated that he will call us and let us know if PCP will manage,

## 2023-09-28 ENCOUNTER — TELEPHONE (OUTPATIENT)
Dept: CARDIOLOGY CLINIC | Age: 53
End: 2023-09-28

## 2023-09-28 DIAGNOSIS — Q23.1 BICUSPID AORTIC VALVE: Primary | ICD-10-CM

## 2023-09-28 NOTE — TELEPHONE ENCOUNTER
Clancy Homans is faxing over a new referral sheet and lab, requesting the lab order be sent back sign.  AMBROSE

## 2023-09-28 NOTE — TELEPHONE ENCOUNTER
Shola Bedoya called and stated that pt is enrolling in the  Coumadin clinic and that they will need an order on file because their protocol  requires a lab order on file in case the finger stick is above a 4. Dr Sincere Heck agreed to sign form for DKW while he is away  Ordered lab for file and faxed watt to .  Receipt confirmed

## 2023-09-29 ENCOUNTER — ANTI-COAG VISIT (OUTPATIENT)
Dept: PHARMACY | Age: 53
End: 2023-09-29

## 2023-09-29 PROBLEM — Z79.01 LONG TERM (CURRENT) USE OF ANTICOAGULANTS: Status: RESOLVED | Noted: 2020-05-18 | Resolved: 2023-09-29

## 2023-10-02 RX ORDER — WARFARIN SODIUM 5 MG/1
TABLET ORAL
Qty: 38 TABLET | Refills: 0 | OUTPATIENT
Start: 2023-10-02

## 2023-10-03 NOTE — TELEPHONE ENCOUNTER
Will be going to [facility] clinic , please look for results in care everywhere

## 2023-10-10 ENCOUNTER — CLINICAL DOCUMENTATION (OUTPATIENT)
Dept: CARDIOLOGY CLINIC | Age: 53
End: 2023-10-10

## 2023-10-25 ENCOUNTER — TELEPHONE (OUTPATIENT)
Dept: CARDIOLOGY CLINIC | Age: 53
End: 2023-10-25

## 2023-11-06 DIAGNOSIS — I10 ESSENTIAL HYPERTENSION: ICD-10-CM

## 2023-11-06 DIAGNOSIS — Z95.2 S/P AORTIC VALVE REPLACEMENT: ICD-10-CM

## 2023-11-06 RX ORDER — ATORVASTATIN CALCIUM 40 MG/1
TABLET, FILM COATED ORAL
Qty: 90 TABLET | Refills: 3 | Status: SHIPPED | OUTPATIENT
Start: 2023-11-06

## 2023-11-06 NOTE — TELEPHONE ENCOUNTER
Last OV: 11/1/22 dkw  Last Labs: 11/23/22 cmp,9/4/20 lipid, 8/23/22 ekg  Last refill: 9/23/22  Next appt:  1/8/24 dkw

## 2024-01-08 ENCOUNTER — TELEPHONE (OUTPATIENT)
Dept: CARDIOLOGY CLINIC | Age: 54
End: 2024-01-08

## 2024-02-19 RX ORDER — WARFARIN SODIUM 5 MG/1
TABLET ORAL
Qty: 40 TABLET | Refills: 1 | Status: SHIPPED | OUTPATIENT
Start: 2024-02-19 | End: 2024-03-08 | Stop reason: SDUPTHER

## 2024-02-19 NOTE — TELEPHONE ENCOUNTER
Lov 11/01/2022  Labs  poct/inr 01/03/2024  Lrf  warfarin 5 mg disp 38+0 08/15/2023  Appt no appt scheduled please advise thanks       Patient counseled on lifestyle modification, diet, and exercise.     Follow Up:   One year    Instructions    Change lovenox to 100 mg sq post op  Resume coumadin at discretion of surgeon  Follow up as scheduled            AVS (Printed 11/1/2022)

## 2024-02-20 ENCOUNTER — TELEPHONE (OUTPATIENT)
Dept: CARDIOLOGY CLINIC | Age: 54
End: 2024-02-20

## 2024-02-23 NOTE — TELEPHONE ENCOUNTER
Spoke with patient and he will call back to schedule a follow-up appointment with Dr. Gates.  sandra

## 2024-02-26 ENCOUNTER — TELEPHONE (OUTPATIENT)
Dept: CARDIOLOGY CLINIC | Age: 54
End: 2024-02-26

## 2024-02-26 NOTE — TELEPHONE ENCOUNTER
----- Message from Barb Farah RN sent at 2/26/2024 11:08 AM EST -----  Please be sure he is following with [facility], dr beltrán does not manage his protime or coumadin dose

## 2024-02-27 NOTE — PROGRESS NOTES
I spoke with pt and relayed message per MMRN/DKW. Pt tests and is managed at [facility]. His last test was yesterday.

## 2024-03-04 ENCOUNTER — TELEPHONE (OUTPATIENT)
Dept: CARDIOLOGY CLINIC | Age: 54
End: 2024-03-04

## 2024-03-08 NOTE — TELEPHONE ENCOUNTER
Received refill request for Warfarin 5mg from [facility] pharmacy.       Last Filled: 2-

## 2024-03-12 RX ORDER — WARFARIN SODIUM 5 MG/1
TABLET ORAL
Qty: 40 TABLET | Refills: 1 | Status: SHIPPED | OUTPATIENT
Start: 2024-03-12

## 2024-04-17 ENCOUNTER — TELEPHONE (OUTPATIENT)
Dept: CARDIOLOGY CLINIC | Age: 54
End: 2024-04-17

## 2024-04-17 NOTE — TELEPHONE ENCOUNTER
Pt Is getting managed at . I guess we are getting those as an FYI. I confirmed with pt.    This is noted on the results:    Performed by: [facility]  3.5

## 2024-04-17 NOTE — TELEPHONE ENCOUNTER
----- Message from Barb Farah RN sent at 4/16/2024  3:52 PM EDT -----  Last month patient said he was getting protime management through [facility]. Please confirm this to be true and forward this to whoever is dosing his protime

## 2024-07-30 ENCOUNTER — TELEPHONE (OUTPATIENT)
Dept: CARDIOLOGY CLINIC | Age: 54
End: 2024-07-30

## 2024-07-30 NOTE — TELEPHONE ENCOUNTER
CARDIAC CLEARANCE     What type of procedure are you having?  Right Lateral Canthus Incision and reconstruction     Which physician is performing your procedure?  Dr Shelton    When is your procedure scheduled for?  09/13/24    Where are you having this procedure?  [facility]    Are you taking Blood Thinners? yes   If so what? (Name/dose/frequesncy)  warfarin (COUMADIN) 5 MG tablet [[id number]] , wanting to know if patient should bridge with Enoxaparin       Does the surgeon want you to stop your blood thinner?  If so for how long? 2 days before surgery and 2 days after    Phone Number and Contact Name for Physicians office:  [phone]  Fax number to send information:  [phone]

## 2024-08-01 ENCOUNTER — TELEPHONE (OUTPATIENT)
Dept: CARDIOLOGY CLINIC | Age: 54
End: 2024-08-01

## 2024-08-01 DIAGNOSIS — Z95.2 S/P AORTIC VALVE REPLACEMENT: Primary | ICD-10-CM

## 2024-08-01 NOTE — TELEPHONE ENCOUNTER
Per SHAWNAW - Romaine for standing order    Order placed and faxed via route in [facility].

## 2024-08-05 NOTE — TELEPHONE ENCOUNTER
Noticed pt hasn't scheduled yet.  Called and left msg asking him to call and schedule so DKW can give cardiac clearance.

## 2024-08-14 NOTE — PROGRESS NOTES
[facility]  8/14/24  Referring: Dr. Barnes    REASON FOR CONSULT/CHIEF COMPLAINT/HPI     Reason for visit/ Chief complaint  One year follow up  AVR   HPI Christ Carl III is a 54 y.o. who   He has a history of AVR 2014, on Coumadin. He had normal coronaries in 2014.  He is treated for hypertension, hyperlipidemia.     He underwent robotic laparoscopic repair of a ventral hernia with mesh on 11/2/2022.     He was last seen by me in 11/2022 for an acute visit for abdominal bruising. The Thursday prior, he took his last coumadin dose, mistakenly started lovenox the next day.  On Sunday, he noticed a bruise along his abdomen. The bruise does not hurt, no pain or visible GI/ blood loss.  No fever or chills, no flank bruising or bleeding.      His AS was an incidental finding prior an elective hernia repair. After the AVR he had much more energy.  His wife was having a cervix mass removed 2 days before thanksgiving.     Family history of aneurysm with bicuspic aortic valve.  His half sister had \"heart anuerysm surgery.\"    Today, he is here for pre-op cardiac risk assessment (eye surgery scheduled 9/13/24 thru [facility], Dr. Shelton). He is on Coumadin (managed by [facility]).     Patient is compliant with medications and is tolerating them well without side effects     HISTORY/ALLERGIES/ROS     MedHx:  has a past medical history of Aortic stenosis, Colon polyp, GERD (gastroesophageal reflux disease), Headache(784.0), Heart valve replaced, Hernia, Hyperlipidemia, Hypertension, and Neuropathy.  SurgHx:  has a past surgical history that includes Appendectomy; Colonoscopy; Aortic valve replacement (08/08/2014); Cardiac surgery; and hernia repair (N/A, 11/2/2022).   SocHx:  reports that he has been smoking cigarettes. He has a 5 pack-year smoking history. He has never used smokeless tobacco. He reports current alcohol use of about 3.0 standard drinks of alcohol per week. He reports

## 2024-08-16 ENCOUNTER — TELEPHONE (OUTPATIENT)
Dept: CARDIOLOGY CLINIC | Age: 54
End: 2024-08-16

## 2024-08-16 ENCOUNTER — OFFICE VISIT (OUTPATIENT)
Dept: CARDIOLOGY CLINIC | Age: 54
End: 2024-08-16
Payer: COMMERCIAL

## 2024-08-16 VITALS
OXYGEN SATURATION: 98 % | HEART RATE: 75 BPM | SYSTOLIC BLOOD PRESSURE: 122 MMHG | DIASTOLIC BLOOD PRESSURE: 88 MMHG | BODY MASS INDEX: 38.86 KG/M2 | HEIGHT: 71 IN | WEIGHT: 277.6 LBS

## 2024-08-16 DIAGNOSIS — H02.823 EYELID CYST, RIGHT: ICD-10-CM

## 2024-08-16 DIAGNOSIS — Q23.1 BICUSPID AORTIC VALVE: ICD-10-CM

## 2024-08-16 DIAGNOSIS — Z95.2 S/P AORTIC VALVE REPLACEMENT: ICD-10-CM

## 2024-08-16 DIAGNOSIS — I10 ESSENTIAL HYPERTENSION: ICD-10-CM

## 2024-08-16 DIAGNOSIS — Z01.810 PREOP CARDIOVASCULAR EXAM: Primary | ICD-10-CM

## 2024-08-16 PROCEDURE — 99214 OFFICE O/P EST MOD 30 MIN: CPT | Performed by: INTERNAL MEDICINE

## 2024-08-16 PROCEDURE — 93000 ELECTROCARDIOGRAM COMPLETE: CPT | Performed by: INTERNAL MEDICINE

## 2024-08-16 PROCEDURE — 3079F DIAST BP 80-89 MM HG: CPT | Performed by: INTERNAL MEDICINE

## 2024-08-16 PROCEDURE — 3074F SYST BP LT 130 MM HG: CPT | Performed by: INTERNAL MEDICINE

## 2024-08-16 RX ORDER — DOXYCYCLINE HYCLATE 100 MG
TABLET ORAL
Qty: 5 TABLET | Refills: 0 | Status: SHIPPED | OUTPATIENT
Start: 2024-08-16

## 2024-08-16 RX ORDER — ERGOCALCIFEROL 1.25 MG/1
50000 CAPSULE ORAL
COMMUNITY

## 2024-08-16 NOTE — TELEPHONE ENCOUNTER
Kallie called to ask how many pills of doxycycline hyclate 100mg is the Pt to be taking before his surgeryy.  Please call to clarify the amount.  Thank you

## 2024-08-16 NOTE — PROGRESS NOTES
[facility]  8/16/24  Referring: Dr. Barnes    REASON FOR CONSULT/CHIEF COMPLAINT/HPI     Reason for visit/ Chief complaint  One year follow up  AVR   HPI Christ Bull Friend III is a 54 y.o. who   He has a history of AVR 2014, on Coumadin. He had normal coronaries in 2014.  He is treated for hypertension, hyperlipidemia.He underwent robotic laparoscopic repair of a ventral hernia with mesh on 11/2/2022.     Family history of aneurysm with bicuspic aortic valve.  His half sister had \"heart anuerysm surgery.\"    Recommended that he have a cta to evaluate his aorta, this was not done.    Today, he is here for pre-op cardiac risk assessment (eye cyst removal scheduled 9/13/24 thru [facility], Dr. Shelton). He is on Coumadin (managed by [facility]).     Today he feels well. He is able to walk up a flight of stairs without stopping, no shortness of breath palpitations or dizziness.  No orthopnea. Occasional swelling in ankles and feet.  This has not worsened. Occasionally feels like something in his throat. This never occurs with exertion, not exacerbated by food. No orthopnea or edema.  He would like to lose 30 pounds.         HISTORY/ALLERGIES/ROS     MedHx:  has a past medical history of Aortic stenosis, Colon polyp, GERD (gastroesophageal reflux disease), Headache(784.0), Heart valve replaced, Hernia, Hyperlipidemia, Hypertension, and Neuropathy.  SurgHx:  has a past surgical history that includes Appendectomy; Colonoscopy; Aortic valve replacement (08/08/2014); Cardiac surgery; and hernia repair (N/A, 11/2/2022).   SocHx:  reports that he has been smoking cigarettes. He has a 5 pack-year smoking history. He has never used smokeless tobacco. He reports current alcohol use of about 3.0 standard drinks of alcohol per week. He reports current drug use. Drug: Marijuana (Weed).   FamHx: Sister with \"Aneurysm\"  Allergies: Patient has no known allergies.   ROS:   Review of Systems

## 2024-08-22 ENCOUNTER — TELEPHONE (OUTPATIENT)
Dept: CARDIOLOGY CLINIC | Age: 54
End: 2024-08-22

## 2024-08-22 DIAGNOSIS — Z95.2 S/P AORTIC VALVE REPLACEMENT: Primary | ICD-10-CM

## 2024-08-22 NOTE — TELEPHONE ENCOUNTER
Birgit called to request an order for the Pt BNP before his surgery.  Need the order before 09/05.  Please fax the order to:  [phone].  Please advise.  Thank you

## 2024-09-15 PROBLEM — Z01.810 PREOP CARDIOVASCULAR EXAM: Status: RESOLVED | Noted: 2022-08-23 | Resolved: 2024-09-15

## 2024-10-02 ENCOUNTER — TELEPHONE (OUTPATIENT)
Dept: CARDIOLOGY CLINIC | Age: 54
End: 2024-10-02

## 2024-10-21 ENCOUNTER — TELEPHONE (OUTPATIENT)
Dept: CARDIOLOGY CLINIC | Age: 54
End: 2024-10-21

## 2024-10-21 NOTE — TELEPHONE ENCOUNTER
I called MYAH to get a fax number  [phone] or [phone]. Letter faxed , receipt confirmed.

## 2024-10-31 ENCOUNTER — TELEPHONE (OUTPATIENT)
Dept: CARDIOLOGY CLINIC | Age: 54
End: 2024-10-31

## 2024-10-31 DIAGNOSIS — Z95.2 S/P AORTIC VALVE REPLACEMENT: Primary | ICD-10-CM

## 2024-10-31 NOTE — TELEPHONE ENCOUNTER
Pt states [facility] anti coag [facility] has pt stopping warfarin 11/17/24 pt's sx 11/19/24 and they have him starting lovenox on 11/20/24 and then on 11/22/24 they want him to take both lovenox and warfarin. Pt states this seems a little weird to him and wants DKW opinion. Please call pt to advise.    [facility] ph # is [phone]

## 2024-11-01 DIAGNOSIS — Z01.810 PREOP CARDIOVASCULAR EXAM: Primary | ICD-10-CM

## 2024-11-01 NOTE — TELEPHONE ENCOUNTER
Spoke with  anti-coag clinic to clarify instructions given to patient.    Hold warfarin two days prior to procedure starting 11/17.   Procedure planned for 11/19.  Lovenox injections for 7 days post procedure from 11/20 to 11/26  Warfarin 10 mg 11/22-11/25  Warfarin 5 mg 11/26  Return to anti-coag clinic on 11/27     Clinic requesting order for BMP from referring provider (SHAKIRA) prior to surgery, in order to properly dose lovenox.

## 2024-11-05 ENCOUNTER — TELEPHONE (OUTPATIENT)
Dept: CARDIOLOGY CLINIC | Age: 54
End: 2024-11-05

## 2024-11-05 NOTE — TELEPHONE ENCOUNTER
CARDIAC CLEARANCE     What type of procedure are you having?  Cyst Removal    Which physician is performing your procedure?  Dr. Cyn Shelton    When is your procedure scheduled for?   11/19    Where are you having this procedure?  [facility] in [place]    Are you taking Blood Thinners?  Yes   If so what? (Name/dose/frequesncy)  Coumadin 5mg, Aspirin 81mg     Does the surgeon want you to stop your blood thinner?  If so for how long?  Yes - 2 days prior and 2 days after    Phone Number and Contact Name for Physicians office:  Kaci - [phone]    Fax number to send information:  [phone]

## 2024-11-06 DIAGNOSIS — Z95.2 S/P AORTIC VALVE REPLACEMENT: ICD-10-CM

## 2024-11-06 DIAGNOSIS — I10 ESSENTIAL HYPERTENSION: ICD-10-CM

## 2024-11-06 NOTE — TELEPHONE ENCOUNTER
Received refill request for Atorvastatin and Metoprolol from [facility] pharmacy.    Last ov:2024 DKNAMRATA    Last labs:2024 Lipid    Last [id number]    Last Refill:2023    Next appointment:2024 SHAKIRA

## 2024-11-11 RX ORDER — METOPROLOL TARTRATE 25 MG/1
TABLET, FILM COATED ORAL
Qty: 180 TABLET | Refills: 3 | Status: SHIPPED | OUTPATIENT
Start: 2024-11-11

## 2024-11-11 RX ORDER — ATORVASTATIN CALCIUM 40 MG/1
TABLET, FILM COATED ORAL
Qty: 90 TABLET | Refills: 3 | Status: SHIPPED | OUTPATIENT
Start: 2024-11-11

## 2024-11-12 NOTE — TELEPHONE ENCOUNTER
It is not unusual to overlap lovenox and warfarin. Warfarin takes three days to get up to a suitable INR. My only suggestion would be to check INR sooner on 11/25. Please call and let patient/clinic know

## 2024-12-10 ASSESSMENT — ENCOUNTER SYMPTOMS
COUGH: 0
SHORTNESS OF BREATH: 0
CHEST TIGHTNESS: 0
ABDOMINAL DISTENTION: 0
PHOTOPHOBIA: 0
NAUSEA: 0
BLOOD IN STOOL: 0
ABDOMINAL PAIN: 0

## 2024-12-11 ENCOUNTER — OFFICE VISIT (OUTPATIENT)
Dept: CARDIOLOGY CLINIC | Age: 54
End: 2024-12-11
Payer: COMMERCIAL

## 2024-12-11 VITALS
WEIGHT: 270 LBS | DIASTOLIC BLOOD PRESSURE: 80 MMHG | BODY MASS INDEX: 37.8 KG/M2 | HEIGHT: 71 IN | OXYGEN SATURATION: 99 % | HEART RATE: 75 BPM | SYSTOLIC BLOOD PRESSURE: 114 MMHG

## 2024-12-11 DIAGNOSIS — Q23.81 BICUSPID AORTIC VALVE: ICD-10-CM

## 2024-12-11 DIAGNOSIS — I77.810 DILATED AORTIC ROOT (HCC): ICD-10-CM

## 2024-12-11 DIAGNOSIS — Z95.2 S/P AORTIC VALVE REPLACEMENT: Primary | ICD-10-CM

## 2024-12-11 DIAGNOSIS — I10 ESSENTIAL HYPERTENSION: ICD-10-CM

## 2024-12-11 DIAGNOSIS — Z82.49 FAMILY HISTORY OF ANEURYSM: ICD-10-CM

## 2024-12-11 DIAGNOSIS — E78.2 MIXED HYPERLIPIDEMIA: ICD-10-CM

## 2024-12-11 PROCEDURE — 99214 OFFICE O/P EST MOD 30 MIN: CPT | Performed by: INTERNAL MEDICINE

## 2024-12-11 PROCEDURE — 3079F DIAST BP 80-89 MM HG: CPT | Performed by: INTERNAL MEDICINE

## 2024-12-11 PROCEDURE — 3074F SYST BP LT 130 MM HG: CPT | Performed by: INTERNAL MEDICINE

## 2024-12-11 RX ORDER — METOPROLOL TARTRATE 50 MG
TABLET ORAL
Qty: 180 TABLET | Refills: 3 | Status: SHIPPED | OUTPATIENT
Start: 2024-12-11

## 2024-12-11 RX ORDER — ATORVASTATIN CALCIUM 40 MG/1
40 TABLET, FILM COATED ORAL DAILY
Qty: 90 TABLET | Refills: 3 | Status: SHIPPED | OUTPATIENT
Start: 2024-12-11

## 2024-12-11 NOTE — PROGRESS NOTES
only)  Patient not taking: Reported on 12/11/2024 5/22/19   Provider, Kev, MD       PHYSICAL EXAM        Vitals:    12/11/24 0904   BP: 114/80   Pulse: 75   SpO2: 99%            Weight - Scale: 122.5 kg (270 lb)     Gen Alert, cooperative, no distress Heart  Regular rate and rhythm, click, 1/6 systolic murmur   Head Normocephalic, atraumatic, no abnormalities Abd  Soft, NT, +BS,   Eyes PERRLA, conj/corn clear Ext  Ext nl, AT, no C/C, no edema   Nose Nares normal, no drain age, Non-tender Pulse 2+ and symmetric   Throat Lips, mucosa, tongue normal Skin Color/text/turg nl,    Neck S/S, TM, NT, no bruit Psych Nl mood and affect   Lung CTA-B, unlabored, no DTP     Ch wall NT, sternotomy        LABS and Imaging     Relevant and available CV data reviewed  Echo/MRI: 9/30/2021 Summary   -Normal left ventricle size and systolic function with an estimated ejection   fraction of 55-60%.  -No regional wall motion abnormalities are seen.  -There is moderate concentric left ventricular hypertrophy.  -Normal diastolic function.  -Mild mitral regurgitation is present.  -A mechanical artificial aortic valve appears well seated with a maximum   velocity of 2.23m/s and a mean gradient of 11mmHg.  -Right ventricular systolic function is mildly reduced .  -Mild tricuspid regurgitation.   -The right atrium is dilated.  Echo 2/21/23 ([facility])  Summary:   Left ventricular wall motion is normal.   Left ventricular systolic function is normal. (LVEF>/=55%)   Overall left ventricular ejection fraction is estimated to be 60-65%.   Pulmonary artery pressure is normal.   The study was technically difficult.   Cath: 2014  Normal cornaries  Holter  none  EKG: sinus rhythm,  Personally interpreted 8/23/22  Stress: none  moderate Risk  moderate Complexity/Medical Decision Making  Outside records Reviewed  Labs Reviewed  Prior Imaging, ekg, cath, echo reviewed when available  Medications reviewed  Old Notes reviewed  ASSESSMENT AND PLAN

## 2024-12-31 ENCOUNTER — TELEPHONE (OUTPATIENT)
Dept: CARDIOLOGY CLINIC | Age: 54
End: 2024-12-31

## 2025-01-16 RX ORDER — WARFARIN SODIUM 5 MG/1
TABLET ORAL
Qty: 120 TABLET | Refills: 1 | OUTPATIENT
Start: 2025-01-16

## 2025-01-16 NOTE — TELEPHONE ENCOUNTER
LMOM for pt to have pharmacy send refill request for Coumadin to . We are no longer managing his INR.

## 2025-01-16 NOTE — TELEPHONE ENCOUNTER
Received refill request for warfarin (COUMADIN) 5 MG tablet  from [facility] pharmacy.     Last OV: 12- DKW    Next OV: None.     Last Labs: 12- PT INR    Last Filled: 03- DKW

## 2025-02-26 NOTE — RESULT ENCOUNTER NOTE
Dr Gates does not manage his INR, please call and make sure he is still getting managed by [facility], thank you

## 2025-05-09 ENCOUNTER — TELEPHONE (OUTPATIENT)
Dept: CARDIOLOGY CLINIC | Age: 55
End: 2025-05-09

## 2025-05-09 RX ORDER — DOXYCYCLINE HYCLATE 100 MG
TABLET ORAL
Qty: 5 TABLET | Refills: 2 | Status: SHIPPED | OUTPATIENT
Start: 2025-05-09

## 2025-05-09 NOTE — TELEPHONE ENCOUNTER
CARDIAC CLEARANCE     What type of procedure are you having?  Tooth extraction  Which physician is performing your procedure?  Dr. Enamorado  When is your procedure scheduled for?  5/13/25  Where are you having this procedure?  Dr [facility]  Are you taking Blood Thinners?   If so what? (Name/dose/frequesncy)  Yes, Vi   Does the surgeon want you to stop your blood thinner?  If so for how long?  Pt states he is having his INR taken the day of and will not have done if 2.5 or above. Pt states DKW wanted him to take antibiotic prior any procedure. Please advise pt.  Phone Number and Contact Name for Physicians office:  [phone]  Fax number to send information:  Pt doesn't know

## 2025-05-13 ENCOUNTER — RESULTS FOLLOW-UP (OUTPATIENT)
Dept: CARDIOLOGY CLINIC | Age: 55
End: 2025-05-13

## 2025-05-14 NOTE — TELEPHONE ENCOUNTER
----- Message from PAULA MOYA MA sent at 5/14/2025 12:37 PM EDT -----  I spoke with pt. His INR is still being managed by . He stated that he needed the check prior to dental procedure. He also noted that script for doxy said 1 tablet prior, but 5 tablets were sent. He wanted to make sure that sig is correct.    Please advise

## 2025-05-14 NOTE — TELEPHONE ENCOUNTER
Per SHAKIRA - Take 1 tablet 1 hour prior.  He sent a quantity of 5 so he would not just be picking up 1 tablet.      I tried calling Vikash to notify.  No answer, voicemail left requesting a return call.

## 2025-09-05 ENCOUNTER — RESULTS FOLLOW-UP (OUTPATIENT)
Dept: CARDIOLOGY CLINIC | Age: 55
End: 2025-09-05

## (undated) DEVICE — NEEDLE HYPO 22GA L1.5IN BLK POLYPR HUB S STL REG BVL STR

## (undated) DEVICE — SUTURE VCRL + SZ 4-0 L18IN ABSRB UD L19MM PS-2 3/8 CIR PRIM VCP496H

## (undated) DEVICE — 3M™ IOBAN™ 2 ANTIMICROBIAL INCISE DRAPE 6650EZ: Brand: IOBAN™ 2

## (undated) DEVICE — GOWN AURORA NONREINF XXL: Brand: MEDLINE INDUSTRIES, INC.

## (undated) DEVICE — MARKER,SKIN,WI/RULER AND LABELS: Brand: MEDLINE

## (undated) DEVICE — SUTURE 0 STRATAFIX SYMMETRIC PDS + 23CM CT-2 SXPP1A446

## (undated) DEVICE — SUTURE DEV SZ 2-0 WND CLSR ABSRB GS-22 VLOC COVIDIEN VLOCM2145

## (undated) DEVICE — ROBOTIC PK

## (undated) DEVICE — ELECTRODE PT RET AD L9FT HI MOIST COND ADH HYDRGEL CORDED

## (undated) DEVICE — ADHESIVE SKIN CLSR 0.7ML TOP DERMBND ADV

## (undated) DEVICE — TIP COVER ACCESSORY

## (undated) DEVICE — BLANKET WRM W29.9XL79.1IN UP BODY FORC AIR MISTRAL-AIR

## (undated) DEVICE — ARM DRAPE

## (undated) DEVICE — TBG INSUFFLATION W/PUSH ON CON: Brand: MEDLINE INDUSTRIES, INC.

## (undated) DEVICE — LIGHT HANDLE: Brand: DEVON

## (undated) DEVICE — 30977 SEE SHARP - ENHANCED INTRAOPERATIVE LAPAROSCOPE CLEANING & DEFOGGING: Brand: 30977 SEE SHARP - ENHANCED INTRAOPERATIVE LAPAROSCOPE CLEANING & DEFOGGING

## (undated) DEVICE — MERCY FAIRFIELD TURNOVER KIT: Brand: MEDLINE INDUSTRIES, INC.

## (undated) DEVICE — SUTURE V-LOC 180 SZ 2-0 L9IN ABSRB GRN L27MM GS-22 1/2 CIR VLOCL2145

## (undated) DEVICE — SUTURE VCRL + SZ 0 L27IN ABSRB WHT CT-2 1/2 CIR TAPERPOINT VCP270H

## (undated) DEVICE — INSUFFLATION NEEDLE TO ESTABLISH PNEUMOPERITONEUM.: Brand: INSUFFLATION NEEDLE

## (undated) DEVICE — NEEDLE SPNL 20GA L3.5IN YEL HUB S STL REG WALL FIT STYL W/

## (undated) DEVICE — APPLICATOR MEDICATED 26 CC SOLUTION HI LT ORNG CHLORAPREP

## (undated) DEVICE — TROCAR: Brand: KII FIOS FIRST ENTRY

## (undated) DEVICE — SUTURE VLOC 90 2/0 VL 6 GS-22 VLOCM2105

## (undated) DEVICE — SYRINGE, LUER LOCK, 10ML: Brand: MEDLINE

## (undated) DEVICE — SUTURE VCRL + SZ 3-0 L27IN ABSRB UD L26MM SH 1/2 CIR VCP416H

## (undated) DEVICE — BINDER ABD 2XL H12XL60 75IN UNISX STD E 4 PNL DISPOSABLE

## (undated) DEVICE — STERILE POLYISOPRENE POWDER-FREE SURGICAL GLOVES: Brand: PROTEXIS

## (undated) DEVICE — CANNULA SEAL

## (undated) DEVICE — BLADELESS OBTURATOR: Brand: WECK VISTA